# Patient Record
Sex: MALE | Race: WHITE | Employment: UNEMPLOYED | URBAN - NONMETROPOLITAN AREA
[De-identification: names, ages, dates, MRNs, and addresses within clinical notes are randomized per-mention and may not be internally consistent; named-entity substitution may affect disease eponyms.]

---

## 2017-02-23 ENCOUNTER — TELEPHONE (OUTPATIENT)
Dept: INTERNAL MEDICINE | Age: 57
End: 2017-02-23

## 2017-02-28 RX ORDER — LISINOPRIL 20 MG/1
20 TABLET ORAL DAILY
Qty: 30 TABLET | Refills: 11 | Status: SHIPPED | OUTPATIENT
Start: 2017-02-28 | End: 2018-03-19 | Stop reason: SDUPTHER

## 2017-05-10 ENCOUNTER — OFFICE VISIT (OUTPATIENT)
Dept: CARDIOLOGY | Age: 57
End: 2017-05-10
Payer: COMMERCIAL

## 2017-05-10 VITALS
WEIGHT: 227 LBS | BODY MASS INDEX: 31.78 KG/M2 | HEIGHT: 71 IN | DIASTOLIC BLOOD PRESSURE: 80 MMHG | SYSTOLIC BLOOD PRESSURE: 160 MMHG | HEART RATE: 85 BPM

## 2017-05-10 DIAGNOSIS — F41.9 ANXIETY: ICD-10-CM

## 2017-05-10 DIAGNOSIS — Z98.890 S/P CARDIAC CATH: ICD-10-CM

## 2017-05-10 DIAGNOSIS — I25.10 CORONARY ARTERY DISEASE INVOLVING NATIVE CORONARY ARTERY OF NATIVE HEART WITHOUT ANGINA PECTORIS: ICD-10-CM

## 2017-05-10 DIAGNOSIS — I10 ESSENTIAL HYPERTENSION: Primary | ICD-10-CM

## 2017-05-10 PROCEDURE — 93000 ELECTROCARDIOGRAM COMPLETE: CPT | Performed by: INTERNAL MEDICINE

## 2017-05-10 PROCEDURE — 99213 OFFICE O/P EST LOW 20 MIN: CPT | Performed by: INTERNAL MEDICINE

## 2017-11-06 RX ORDER — ATORVASTATIN CALCIUM 40 MG/1
40 TABLET, FILM COATED ORAL NIGHTLY
Qty: 90 TABLET | Refills: 3 | Status: SHIPPED | OUTPATIENT
Start: 2017-11-06 | End: 2019-06-06 | Stop reason: SDUPTHER

## 2017-11-08 ENCOUNTER — APPOINTMENT (OUTPATIENT)
Dept: GENERAL RADIOLOGY | Age: 57
End: 2017-11-08
Payer: COMMERCIAL

## 2017-11-08 ENCOUNTER — HOSPITAL ENCOUNTER (EMERGENCY)
Age: 57
Discharge: HOME | End: 2017-11-08
Attending: EMERGENCY MEDICINE
Payer: COMMERCIAL

## 2017-11-08 VITALS
SYSTOLIC BLOOD PRESSURE: 129 MMHG | HEART RATE: 81 BPM | RESPIRATION RATE: 18 BRPM | TEMPERATURE: 98.1 F | OXYGEN SATURATION: 92 % | DIASTOLIC BLOOD PRESSURE: 93 MMHG

## 2017-11-08 DIAGNOSIS — S01.81XA FACIAL LACERATION, INITIAL ENCOUNTER: Primary | ICD-10-CM

## 2017-11-08 DIAGNOSIS — S00.81XA FACIAL ABRASION, INITIAL ENCOUNTER: ICD-10-CM
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
## 2017-11-08 DIAGNOSIS — S16.1XXA ACUTE STRAIN OF NECK MUSCLE, INITIAL ENCOUNTER: ICD-10-CM

## 2017-11-08 PROCEDURE — 12011 RPR F/E/E/N/L/M 2.5 CM/<: CPT

## 2017-11-08 PROCEDURE — 72050 X-RAY EXAM NECK SPINE 4/5VWS: CPT

## 2017-11-08 PROCEDURE — 72040 X-RAY EXAM NECK SPINE 2-3 VW: CPT

## 2017-11-08 PROCEDURE — 99283 EMERGENCY DEPT VISIT LOW MDM: CPT

## 2017-11-08 PROCEDURE — 2500000003 HC RX 250 WO HCPCS: Performed by: EMERGENCY MEDICINE

## 2017-11-08 PROCEDURE — 6370000000 HC RX 637 (ALT 250 FOR IP): Performed by: EMERGENCY MEDICINE

## 2017-11-08 RX ORDER — LIDOCAINE HYDROCHLORIDE 10 MG/ML
5 INJECTION, SOLUTION INFILTRATION; PERINEURAL ONCE
Status: COMPLETED | OUTPATIENT
Start: 2017-11-08 | End: 2017-11-08

## 2017-11-08 RX ORDER — BACITRACIN, NEOMYCIN, POLYMYXIN B 400; 3.5; 5 [USP'U]/G; MG/G; [USP'U]/G
OINTMENT TOPICAL ONCE
Status: COMPLETED | OUTPATIENT
Start: 2017-11-08 | End: 2017-11-08

## 2017-11-08 RX ADMIN — LIDOCAINE HYDROCHLORIDE 5 ML: 10 INJECTION, SOLUTION INFILTRATION; PERINEURAL at 14:59

## 2017-11-08 RX ADMIN — BACITRACIN, NEOMYCIN, POLYMYXIN B 3.5 G: 400; 3.5; 5 OINTMENT TOPICAL at 15:36

## 2017-11-08 ASSESSMENT — ENCOUNTER SYMPTOMS
EYES NEGATIVE: 1
RESPIRATORY NEGATIVE: 1

## 2017-11-08 ASSESSMENT — PAIN SCALES - GENERAL: PAINLEVEL_OUTOF10: 4

## 2017-11-08 NOTE — ED NOTES
Discharge teaching and instructions for condition explained to patient. AVS reviewed. Patient voiced understanding regarding follow up appointments and care of self at home. Pt discharged to home in stable condition per self.        Oanh Mon RN  11/08/17 7012

## 2017-11-08 NOTE — ED PROVIDER NOTES
saturation is 92%. Physical Exam   Constitutional: He is oriented to person, place, and time. He appears well-developed and well-nourished. Awake alert pleasant gentleman sitting upgoing paperwork for B Aracelis C injury. Obvious upper lower lip injuries   HENT:   Head: Normocephalic and atraumatic. Right Ear: External ear normal.   Left Ear: External ear normal.   Upper lower lip abrasions and superficial lacerations as described under skin examination. No facial deformity. Nose is midline    No dental injury or tongue injury identified   Eyes: EOM are normal. Pupils are equal, round, and reactive to light. Right eye exhibits no discharge. Left eye exhibits no discharge. No scleral icterus. Neck: No tracheal deviation present. Slight diffuse posterior paraspinal muscle area discomfort. There is no bony tenderness or step-off along the midline. The cervical spine was gently flexed extended rotated and tilted for complete clearance after x-rays were obtained and screening   Cardiovascular: Normal rate and regular rhythm. Pulmonary/Chest: Effort normal. No stridor. No respiratory distress. Musculoskeletal: Normal range of motion. He exhibits no deformity. Neurological: He is oriented to person, place, and time. No cranial nerve deficit. Skin: Skin is warm and dry. He is not diaphoretic. Upper lip shows an abrasion contusion and some superficial gravel debris just off the left of the midline. There is a 1 cm vertical full-thickness laceration minimal bleeding that just barely touches the vermilion border    Lower lip has a bit of an avulsion abrasion is not really amenable to suturing no active bleeding some slight superficial swelling and abrasion left the midline lower lip. Out on the left facial area there is superficial abrasion   Psychiatric: He has a normal mood and affect. His behavior is normal.   Nursing note and vitals reviewed.       DIFFERENTIAL DIAGNOSIS:   Superficial laceration 2. Acute strain of neck muscle, initial encounter    3. Fall due to stumbling, initial encounter    4. Facial abrasion, initial encounter          DISPOSITION/PLAN   Discharged in stable improved condition. Return to clinic in one week for suture removal.  Advised to have sore neck headache and aches and pains for the next couple of days. Patient is on full strength aspirin by cardiologist and will need close monitoring for bleeding and return if that should occur    PATIENT REFERRED TO:  Rodolfo Noel  2013 [address] [place] [address]  In 1 week  return here sooner, As needed, If symptoms worsen. ...sutures out 7 days here dr Matthew Mac:  Discharge Medication List as of 11/8/2017  3:25 PM          (Please note that portions of this note were completed with a voice recognition program.  Efforts were made to edit the dictations but occasionally words are mis-transcribed. )    MD Rita Flores MD  11/08/17 2003

## 2017-11-13 ENCOUNTER — OFFICE VISIT (OUTPATIENT)
Dept: CARDIOLOGY | Age: 57
End: 2017-11-13
Payer: COMMERCIAL

## 2017-11-13 VITALS
WEIGHT: 226.6 LBS | HEIGHT: 71 IN | SYSTOLIC BLOOD PRESSURE: 146 MMHG | BODY MASS INDEX: 31.72 KG/M2 | HEART RATE: 82 BPM | DIASTOLIC BLOOD PRESSURE: 76 MMHG

## 2017-11-13 DIAGNOSIS — E78.5 HYPERLIPIDEMIA, UNSPECIFIED HYPERLIPIDEMIA TYPE: ICD-10-CM

## 2017-11-13 DIAGNOSIS — I25.10 CORONARY ARTERY DISEASE INVOLVING NATIVE CORONARY ARTERY OF NATIVE HEART WITHOUT ANGINA PECTORIS: ICD-10-CM

## 2017-11-13 DIAGNOSIS — I10 ESSENTIAL HYPERTENSION: Primary | ICD-10-CM

## 2017-11-13 PROCEDURE — 93000 ELECTROCARDIOGRAM COMPLETE: CPT | Performed by: INTERNAL MEDICINE

## 2017-11-13 PROCEDURE — 99213 OFFICE O/P EST LOW 20 MIN: CPT | Performed by: INTERNAL MEDICINE

## 2017-11-13 NOTE — PROGRESS NOTES
Priscilla Ambrosio  is doing well from a cardiac standpoint. 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 No chest pain, no dyspnea, no PND, no syncope or pre-syncope, no orthopnea. He continues to smoke. Past Medical History:   Diagnosis Date    Bunion     right    CAD (coronary artery disease)     Diverticul disease small and large intestine, no perforati or abscess     GERD (gastroesophageal reflux disease)     Hammer toe     right    Heart attack 11/09/2009    Hypertension     Pericarditis     Positive cardiac stress test     Smoking history        Past Surgical History:   Procedure Laterality Date    CARDIAC CATHETERIZATION      ENDOSCOPY, COLON, DIAGNOSTIC      FOOT SURGERY      KNEE ARTHROSCOPY  7-2006, 1-2014    UPPER GASTROINTESTINAL ENDOSCOPY         Family History   Problem Relation Age of Onset    Diabetes Mother     Early Death Brother 16     MVA    Early Death Sister 46     blood clot    Heart Disease Other        ROS: Otherwise 10 systems reviewed and negative. BP (!) 146/76   Pulse 82   Ht 5' 10.98\" (1.803 m)   Wt 226 lb 9.6 oz (102.8 kg)   BMI 31.62 kg/m²     Vitals as above. Alert and oriented x 3. No JVD, or carotid bruits. Lungs are clear to auscultation. Heart sounds are regular, normal, no murmurs, clicks, gallops or rubs S1, S2.   Abdomen is soft, no tenderness. Extremities No peripheral edema.     EKG:    Meds:    Current Outpatient Prescriptions:     atorvastatin (LIPITOR) 40 MG tablet, Take 1 tablet by mouth nightly, Disp: 90 tablet, Rfl: 3    ticagrelor (BRILINTA) 90 MG TABS tablet, Take 1 tablet by mouth 2 times daily, Disp: 180 tablet, Rfl: 3    metoprolol tartrate (LOPRESSOR) 25 MG tablet, Take 1 tablet by mouth 2 times daily, Disp: 180 tablet, Rfl: 3    lisinopril (PRINIVIL;ZESTRIL) 20 MG tablet, Take 1 tablet by mouth daily, Disp: 30 tablet, Rfl: 11    aspirin EC 81 MG EC tablet, Take 1 tablet by mouth daily, Disp:

## 2017-11-15 ENCOUNTER — HOSPITAL ENCOUNTER (OUTPATIENT)
Dept: LAB | Age: 57
Discharge: HOME | End: 2017-11-15
Payer: COMMERCIAL

## 2017-11-15 ENCOUNTER — HOSPITAL ENCOUNTER (OUTPATIENT)
Dept: GENERAL RADIOLOGY | Age: 57
Discharge: HOME | End: 2017-11-15
Payer: COMMERCIAL

## 2017-11-15 VITALS
HEART RATE: 80 BPM | DIASTOLIC BLOOD PRESSURE: 85 MMHG | OXYGEN SATURATION: 96 % | SYSTOLIC BLOOD PRESSURE: 144 MMHG | TEMPERATURE: 98.4 F | RESPIRATION RATE: 18 BRPM

## 2017-11-15 DIAGNOSIS — Z13.220 SCREENING FOR LIPOID DISORDERS: ICD-10-CM

## 2017-11-15 DIAGNOSIS — I10 ESSENTIAL HYPERTENSION: ICD-10-CM

## 2017-11-15 DIAGNOSIS — Z12.5 SPECIAL SCREENING FOR MALIGNANT NEOPLASM OF PROSTATE: ICD-10-CM

## 2017-11-15 DIAGNOSIS — I25.10 CORONARY ARTERY DISEASE INVOLVING NATIVE CORONARY ARTERY OF NATIVE HEART WITHOUT ANGINA PECTORIS: ICD-10-CM

## 2017-11-15 LAB
ALBUMIN SERPL-MCNC: 4.3 G/DL (ref 3.5–5.2)
ALBUMIN/GLOBULIN RATIO: 1.7 (ref 1–2.5)
ALP BLD-CCNC: 73 U/L (ref 40–129)
ALT SERPL-CCNC: 16 U/L (ref 5–41)
ANION GAP SERPL CALCULATED.3IONS-SCNC: 13 MMOL/L (ref 9–17)
AST SERPL-CCNC: 22 U/L
BILIRUB SERPL-MCNC: 0.48 MG/DL (ref 0.3–1.2)
BUN BLDV-MCNC: 15 MG/DL (ref 6–20)
BUN/CREAT BLD: 17 (ref 9–20)
CALCIUM SERPL-MCNC: 9.5 MG/DL (ref 8.6–10.4)
CHLORIDE BLD-SCNC: 97 MMOL/L (ref 98–107)
CHOLESTEROL/HDL RATIO: 2
CHOLESTEROL: 123 MG/DL
CO2: 25 MMOL/L (ref 20–31)
CREAT SERPL-MCNC: 0.86 MG/DL (ref 0.7–1.2)
GFR AFRICAN AMERICAN: >60 ML/MIN
GFR NON-AFRICAN AMERICAN: >60 ML/MIN
GFR SERPL CREATININE-BSD FRML MDRD: ABNORMAL ML/MIN/{1.73_M2}
GFR SERPL CREATININE-BSD FRML MDRD: ABNORMAL ML/MIN/{1.73_M2}
GLUCOSE BLD-MCNC: 102 MG/DL (ref 70–99)
HDLC SERPL-MCNC: 62 MG/DL
LDL CHOLESTEROL: 51 MG/DL (ref 0–130)
POTASSIUM SERPL-SCNC: 4.8 MMOL/L (ref 3.7–5.3)
PROSTATE SPECIFIC ANTIGEN: 3.09 UG/L
SODIUM BLD-SCNC: 135 MMOL/L (ref 135–144)
TOTAL PROTEIN: 6.9 G/DL (ref 6.4–8.3)
TRIGL SERPL-MCNC: 51 MG/DL
VLDLC SERPL CALC-MCNC: NORMAL MG/DL (ref 1–30)

## 2017-11-15 PROCEDURE — G0103 PSA SCREENING: HCPCS

## 2017-11-15 PROCEDURE — 80053 COMPREHEN METABOLIC PANEL: CPT

## 2017-11-15 PROCEDURE — 80061 LIPID PANEL: CPT

## 2017-11-15 PROCEDURE — 36415 COLL VENOUS BLD VENIPUNCTURE: CPT

## 2017-11-15 PROCEDURE — G0463 HOSPITAL OUTPT CLINIC VISIT: HCPCS

## 2017-11-15 NOTE — PROGRESS NOTES
Pt ambulates to exam room. VSS, except BP slightly elevated, Pt checks it every day and just saw cardiology this week. Dr prepared with suture remove  kit.

## 2017-11-15 NOTE — PLAN OF CARE
Problem: Intellectual/Education/Knowledge Deficit  Goal: Teaching initiated upon admission  Outcome: Completed Date Met: 11/15/17

## 2017-11-22 ENCOUNTER — OFFICE VISIT (OUTPATIENT)
Dept: INTERNAL MEDICINE | Age: 57
End: 2017-11-22
Payer: COMMERCIAL

## 2017-11-22 VITALS
HEIGHT: 70 IN | BODY MASS INDEX: 32.21 KG/M2 | DIASTOLIC BLOOD PRESSURE: 76 MMHG | RESPIRATION RATE: 16 BRPM | SYSTOLIC BLOOD PRESSURE: 134 MMHG | WEIGHT: 225 LBS | HEART RATE: 72 BPM

## 2017-11-22 DIAGNOSIS — M25.561 CHRONIC PAIN OF RIGHT KNEE: ICD-10-CM

## 2017-11-22 DIAGNOSIS — I10 ESSENTIAL HYPERTENSION: Primary | ICD-10-CM

## 2017-11-22 DIAGNOSIS — Z71.6 ENCOUNTER FOR SMOKING CESSATION COUNSELING: ICD-10-CM

## 2017-11-22 DIAGNOSIS — K21.9 GASTROESOPHAGEAL REFLUX DISEASE WITHOUT ESOPHAGITIS: ICD-10-CM

## 2017-11-22 DIAGNOSIS — I25.10 CORONARY ARTERY DISEASE INVOLVING NATIVE CORONARY ARTERY OF NATIVE HEART WITHOUT ANGINA PECTORIS: ICD-10-CM

## 2017-11-22 DIAGNOSIS — Z12.11 ENCOUNTER FOR SCREENING COLONOSCOPY: ICD-10-CM

## 2017-11-22 DIAGNOSIS — G89.29 CHRONIC PAIN OF RIGHT KNEE: ICD-10-CM

## 2017-11-22 DIAGNOSIS — Z12.5 SPECIAL SCREENING FOR MALIGNANT NEOPLASM OF PROSTATE: ICD-10-CM

## 2017-11-22 DIAGNOSIS — R97.20 RISING PSA LEVEL: ICD-10-CM

## 2017-11-22 PROCEDURE — G8484 FLU IMMUNIZE NO ADMIN: HCPCS | Performed by: INTERNAL MEDICINE

## 2017-11-22 PROCEDURE — G8427 DOCREV CUR MEDS BY ELIG CLIN: HCPCS | Performed by: INTERNAL MEDICINE

## 2017-11-22 PROCEDURE — G8598 ASA/ANTIPLAT THER USED: HCPCS | Performed by: INTERNAL MEDICINE

## 2017-11-22 PROCEDURE — 4004F PT TOBACCO SCREEN RCVD TLK: CPT | Performed by: INTERNAL MEDICINE

## 2017-11-22 PROCEDURE — G8417 CALC BMI ABV UP PARAM F/U: HCPCS | Performed by: INTERNAL MEDICINE

## 2017-11-22 PROCEDURE — 99214 OFFICE O/P EST MOD 30 MIN: CPT | Performed by: INTERNAL MEDICINE

## 2017-11-22 PROCEDURE — 3017F COLORECTAL CA SCREEN DOC REV: CPT | Performed by: INTERNAL MEDICINE

## 2017-11-22 RX ORDER — VARENICLINE TARTRATE 1 MG/1
1 TABLET, FILM COATED ORAL 2 TIMES DAILY
Qty: 60 TABLET | Refills: 4 | Status: SHIPPED | OUTPATIENT
Start: 2017-11-22 | End: 2018-11-30

## 2017-11-22 ASSESSMENT — PATIENT HEALTH QUESTIONNAIRE - PHQ9
SUM OF ALL RESPONSES TO PHQ QUESTIONS 1-9: 0
2. FEELING DOWN, DEPRESSED OR HOPELESS: 0
SUM OF ALL RESPONSES TO PHQ9 QUESTIONS 1 & 2: 0
1. LITTLE INTEREST OR PLEASURE IN DOING THINGS: 0

## 2017-11-22 ASSESSMENT — ENCOUNTER SYMPTOMS
NAUSEA: 0
VOMITING: 0
CONSTIPATION: 0
HEARTBURN: 1
COUGH: 0
DIARRHEA: 0
SHORTNESS OF BREATH: 0
BACK PAIN: 0
BLOOD IN STOOL: 0
ABDOMINAL PAIN: 0
EYE PAIN: 0

## 2017-11-22 NOTE — PATIENT INSTRUCTIONS
Patient Education        Learning About Physical Activity  What is physical activity? Physical activity is any kind of activity that gets your body moving. The types of physical activity that can help you get fit and stay healthy include:  · Aerobic or \"cardio\" activities that make your heart beat faster and make you breathe harder, such as brisk walking, riding a bike, or running. Aerobic activities strengthen your heart and lungs and build up your endurance. · Strength training activities that make your muscles work against, or \"resist,\" something, such as lifting weights or doing push-ups. These activities help tone and strengthen your muscles. · Stretches that allow you to move your joints and muscles through their full range of motion. Stretching helps you be more flexible and avoid injury. What are the benefits of physical activity? Being active is one of the best things you can do to get fit and stay healthy. It helps you to:  · Feel stronger and have more energy to do all the things you like to do. · Focus better at school or work and perform better in sports. · Feel, think, and sleep better. · Reach and stay at a healthy weight. · Lose fat and build lean muscle. · Lower your risk for serious health problems. · Keep your bones, muscles, and joints strong. Being fit lets you do more physical activity. And it lets you work out harder without as much effort. How can you make physical activity part of your life? Get at least 30 minutes of exercise on most days of the week. Walking is a good choice. You also may want to do other activities, such as running, swimming, cycling, or playing tennis or team sports. Pick activities that you like--ones that make your heart beat faster, your muscles stronger, and your muscles and joints more flexible. If you find more than one thing you like doing, do them all. You don't have to do the same thing every day. Get your heart pumping every day.  Any activity that makes your heart beat faster and keeps it at that rate for a while counts. Here are some great ways to get your heart beating faster:  · Go for a brisk walk, run, or bike ride. · Go for a hike or swim. · Go in-line skating. · Play a game of touch football, basketball, or soccer. · Ride a bike. · Play tennis or racquetball. · Climb stairs. Even some household chores can be aerobic--just do them at a faster pace. Vacuuming, raking or mowing the lawn, sweeping the garage, and washing and waxing the car all can help get your heart rate up. Strengthen your muscles during the week. You don't have to lift heavy weights or grow big, bulky muscles to get stronger. Doing a few simple activities that make your muscles work against, or \"resist,\" something can help you get stronger. For example, you can:  · Do push-ups or sit-ups, which use your own body weight as resistance. · Lift weights or dumbbells or use stretch bands at home or in a gym or community center. Stretch your muscles often. Stretching will help you as you become more active. It can help you stay flexible, loosen tight muscles, and avoid injury. It can also help improve your balance and posture and can be a great way to relax. Be sure to stretch the muscles you'll be using when you work out. It's best to warm your muscles slightly before you stretch them. Walk or do some other light aerobic activity for a few minutes, and then start stretching. When you stretch your muscles:  · Do it slowly. Stretching is not about going fast or making sudden movements. · Don't push or bounce during a stretch. · Hold each stretch for at least 15 to 30 seconds, if you can. You should feel a stretch in the muscle, but not pain. · Breathe out as you do the stretch. Then breathe in as you hold the stretch. Don't hold your breath. If you're worried about how more activity might affect your health, have a checkup before you start.  Follow any special advice your doctor gives you for getting a smart start. Where can you learn more? Go to https://chpepiceweb.[employer] Shadows. org and sign in to your [employer] account. Enter S160 in the Snapette box to learn more about \"Learning About Physical Activity. \"     If you do not have an account, please click on the \"Sign Up Now\" link. Current as of: March 13, 2017  Content Version: 11.3  © 2930-3310 [employer]. Care instructions adapted under license by [facility] ([facility]). If you have questions about a medical condition or this instruction, always ask your healthcare professional. [address] any warranty or liability for your use of this information.

## 2017-11-22 NOTE — PROGRESS NOTES
[facility]  [address] [id number]  Dept: [phone]  Dept Fax: [phone]  Loc: [phone]    Deandre Avilez is a 56 y.o. male who presents today for his medical conditions/complaints as noted below. Deandre Avilez is c/o of   Chief Complaint   Patient presents with    Hypertension     annual, labs    Knee Pain     chronic right knee pain- annual appt    Gastroesophageal Reflux     annual         HPI:     Hypertension   This is a chronic (Essential hypertension) problem. The current episode started more than 1 year ago. The problem has been waxing and waning since onset. The problem is controlled. Pertinent negatives include no chest pain, headaches, neck pain, palpitations or shortness of breath. Knee Pain    The incident occurred more than 1 week ago. The incident occurred at home. There was no injury mechanism. The pain is present in the right knee. The pain is mild. The pain has been fluctuating since onset. Pertinent negatives include no numbness. Gastroesophageal Reflux   He complains of heartburn. He reports no abdominal pain, no chest pain, no coughing or no nausea. This is a chronic problem. The current episode started more than 1 year ago. The problem occurs rarely. The problem has been waxing and waning.        No results found for: LABA1C             No results found for: LABMICR  LDL Cholesterol (mg/dL)   Date Value   11/15/2017 51   11/09/2016 42   11/16/2015 97         AST (U/L)   Date Value   11/15/2017 22     ALT (U/L)   Date Value   11/15/2017 16     BUN (mg/dL)   Date Value   11/15/2017 15     BP Readings from Last 3 Encounters:   11/22/17 134/76   11/15/17 (!) 144/85   11/13/17 (!) 146/76              Past Medical History:   Diagnosis Date    Bunion     right    CAD (coronary artery disease)     Diverticul disease small and large intestine, no perforati or abscess     GERD (gastroesophageal reflux disease)     Hammer toe Lymphadenopathy:     He has no cervical adenopathy. Neurological: He is alert and oriented to person, place, and time. No cranial nerve deficit (grossly). Skin: Skin is warm and dry. No rash noted. Psychiatric: He has a normal mood and affect. Thought content normal.   Vitals reviewed. /76 (Site: Left Arm, Position: Sitting, Cuff Size: Large Adult)   Pulse 72   Resp 16   Ht 5' 10\" (1.778 m)   Wt 225 lb (102.1 kg)   BMI 32.28 kg/m²     Assessment:      1. Essential hypertension  Comprehensive Metabolic Panel    Lipid Panel   2. Chronic pain of right knee     3. Gastroesophageal reflux disease without esophagitis  Comprehensive Metabolic Panel    Lipid Panel   4. Coronary artery disease involving native coronary artery of native heart without angina pectoris  Comprehensive Metabolic Panel    Lipid Panel   5. Special screening for malignant neoplasm of prostate  PSA Screening   6. Rising PSA level  PSA, Diagnostic   7. Encounter for screening colonoscopy  [address], [place], General Surgery [place]             Plan:      Return in about 1 year (around 11/22/2018) for Hypertension, GERD, knee pain.     Orders Placed This Encounter   Procedures    Comprehensive Metabolic Panel     Standing Status:   Future     Standing Expiration Date:   11/22/2018    Lipid Panel     Standing Status:   Future     Standing Expiration Date:   11/22/2018     Order Specific Question:   Is Patient Fasting?/# of Hours     Answer:   15    PSA Screening     Standing Status:   Future     Standing Expiration Date:   11/22/2018    PSA, Diagnostic     Standing Status:   Future     Standing Expiration Date:   11/22/2018   [address], [place], [facility] [place]     Referral Priority:   Routine     Referral Type:   Consult for Advice and Opinion     Referral Reason:   Specialty Services Required     Referred to Provider:   Oliver Miller MD     Requested Specialty:   General Surgery     Number of

## 2017-11-29 ENCOUNTER — TELEPHONE (OUTPATIENT)
Dept: SURGERY | Age: 57
End: 2017-11-29

## 2017-11-29 ENCOUNTER — INITIAL CONSULT (OUTPATIENT)
Dept: SURGERY | Age: 57
End: 2017-11-29
Payer: COMMERCIAL

## 2017-11-29 VITALS
BODY MASS INDEX: 32.84 KG/M2 | DIASTOLIC BLOOD PRESSURE: 76 MMHG | SYSTOLIC BLOOD PRESSURE: 140 MMHG | TEMPERATURE: 98.3 F | RESPIRATION RATE: 16 BRPM | WEIGHT: 229.4 LBS | HEART RATE: 80 BPM | HEIGHT: 70 IN

## 2017-11-29 DIAGNOSIS — Z12.11 ENCOUNTER FOR SCREENING COLONOSCOPY: Primary | ICD-10-CM

## 2017-11-29 PROCEDURE — 99204 OFFICE O/P NEW MOD 45 MIN: CPT | Performed by: SURGERY

## 2017-11-29 PROCEDURE — G8417 CALC BMI ABV UP PARAM F/U: HCPCS | Performed by: SURGERY

## 2017-11-29 PROCEDURE — G8427 DOCREV CUR MEDS BY ELIG CLIN: HCPCS | Performed by: SURGERY

## 2017-11-29 PROCEDURE — 3017F COLORECTAL CA SCREEN DOC REV: CPT | Performed by: SURGERY

## 2017-11-29 PROCEDURE — G8484 FLU IMMUNIZE NO ADMIN: HCPCS | Performed by: SURGERY

## 2017-11-29 PROCEDURE — 4004F PT TOBACCO SCREEN RCVD TLK: CPT | Performed by: SURGERY

## 2017-11-29 PROCEDURE — G8598 ASA/ANTIPLAT THER USED: HCPCS | Performed by: SURGERY

## 2017-11-29 NOTE — TELEPHONE ENCOUNTER
[facility]         Patient:Ritchie Garcia           :1960           Surgical/Procedure Planned: colonoscopy     Date & Location: 18       Outpatient   Planned Length of OR: 30 min    Sedation: intravenous sedation        Estimated Cardiac Risk for Non-Cardiac Surgery/Procedure     Low______ Moderate______ High______    Medication Instructions - Clarification needed by this date:     -Other medications       :brilinta    ASA 81 mg Hold ___ Days      Resume medications:      Provider:      Signature of Provider Giving Orders for Medication holds:    _____________________________________________

## 2017-11-29 NOTE — LETTER
COLONOSCOPY             Day Before Examination:        Morning:   Mix bowel prep according to directions and refrigerate. Only CLEAR  LIQUIDS are permitted until midnight. NO FOOD THIS DAY!! Clear liquids including any of the following:   Water   Clear broth or bouillon   7-up, Sprite or Ginger ale   Gatorade or Martín-Aid   Popsicles   Coffee or tea (without milk or sweetener)   Plain Jell-o   NOTHING RED OR PURPLE     At 12 noon take two (2) Dulcolax tablets     Evening: Begin drinking prep at 4:00pm. Drink an 8 ounce glass every 10 minutes. It is  best to drink the whole glass rapidly rather than sipping small amounts. Continue  drinking until the bottle is empty. Bowel movements should begin approximately one(1) hour after the first glass of prep. They will continue periodically one (1) to two (2) hours after drinking the first glass. If you experience bloating, cramping or nausea set the prep aside for 30-40 minutes, the start again. This is temporary and will disappear once bowel movements begin. AMBULATORY SURGERY INSTRUCTIONS    - If you should develop a cold, sore throat, cough, fever or other new indication of illness prior to the scheduled surgery, please notify the [address] [place] [address] office as early as possible. - DO NOT EAT OR DRINK ANYTHING AFTER MIDNIGHT THE NIGHT BEFORE YOUR SURGERY. This includes water, tea, juice, cereal, coffee, etc.    - Refrain from smoking the day of your surgery or procedure. - Wear simple loose clothing, which can be easily changed. - Do not wear any make-up, lipstick or nail polish. - Please leave contact lenses at home or bring container for them. - Leave jewelry (including rings) and other valuables at home. - Make arrangements for a family member or friend to drive you to and from the surgery center.  The anesthetic may affect your judgement following surgery and driving a vehicle within 24 hours after the anesthesia could be dangerous. Please remember that a responsible adult must remain in the building at all times during your surgery. - Children should be accompanied by two adults; one to watch the child, the other to drive the vehicle home. - Please shower or bathe both on the evening prior to surgery and on the morning of surgery using an antibacterial soap/Hibiclens    - You may be asked to sign several forms prior to surgery; patients under age 25 have a parent or legal guardian sign the permit to operate.    - DO NOT take aspirin, Aleve, Motrin, Ibuprofen, Naproxen, Vitamins or Herbal supplements for 1 weeks or 7 days prior to the day of surgery.    -The morning of your procedure please take blood pressure, heart, and seizure medications with a small sip of water. Day of procedure report to the [facility], Registration office on the 1st floor in the hospital, after check in you will then go to the second floor. [facility] requests that all medications are kept in their original bottles and brought to the procedure. PROCEDURE DATE:                      Estimated surgery arrival time     You will be notified the day before surgery (usually in the afternoon) of your arrival time by the surgery center.

## 2017-11-29 NOTE — PROGRESS NOTES
SOCIAL HISTORY      Smoking    Tobacco yes   Marijuana No   Chew No   Snuff No     Drinking     Alcohol Yes     Non-Alcoholic No       Coffee Yes     Pop No     Tea Yes    Any over the counter medications      NSAID'S Yes      Health food supplements  Yes what was listed in chart        Daily Diet (Do you eat at least one of  these daily)        Beef Yes     Pork Yes     Fish Yes     Poultry Yes     Dairy Yes        Do you consume any of the following at least once a day     Fruits no     Vegetables No     Fiber No       Restricted calorie diet No   Diabetic diet No    Chocolate Yes  Laxatives No    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

## 2017-11-29 NOTE — PATIENT INSTRUCTIONS
is worse than the test. It may be uncomfortable, and you may feel hungry on the clear liquid diet. Some people do not go to work or do their usual activities on the day of the prep. Arrange to have someone take you home after the test.  What can you expect after a colonoscopy? The nurses will watch you for 1 to 2 hours until the medicines wear off. Then you can go home. You will need a ride. Your doctor will tell you when you can eat and do your usual activities. Your doctor will talk to you about when you will need your next colonoscopy. The results of your test and your risk for colorectal cancer will help your doctor decide how often you need to be checked. Follow-up care is a key part of your treatment and safety. Be sure to make and go to all appointments, and call your doctor if you are having problems. It's also a good idea to know your test results and keep a list of the medicines you take. Where can you learn more? Go to https://angelMDpeju.Welltheon. org and sign in to your [employer] account. Enter O694 in the gaytravel.com box to learn more about \"Learning About Colonoscopy. \"     If you do not have an account, please click on the \"Sign Up Now\" link. Current as of: July 26, 2016  Content Version: 11.3  © 9141-3386 [employer]. Care instructions adapted under license by [facility] ([facility]). If you have questions about a medical condition or this instruction, always ask your healthcare professional. [place] [address] any warranty or liability for your use of this information.

## 2017-11-29 NOTE — PROGRESS NOTES
CATHETERIZATION      FOOT SURGERY      KNEE ARTHROSCOPY  7-2006, 1-2014    UPPER GASTROINTESTINAL ENDOSCOPY         Current Outpatient Prescriptions on File Prior to Visit   Medication Sig Dispense Refill    ticagrelor (BRILINTA) 60 MG TABS tablet Take 1 tablet by mouth 2 times daily 180 tablet 3    atorvastatin (LIPITOR) 40 MG tablet Take 1 tablet by mouth nightly 90 tablet 3    metoprolol tartrate (LOPRESSOR) 25 MG tablet Take 1 tablet by mouth 2 times daily 180 tablet 3    lisinopril (PRINIVIL;ZESTRIL) 20 MG tablet Take 1 tablet by mouth daily 30 tablet 11    aspirin EC 81 MG EC tablet Take 1 tablet by mouth daily 90 tablet 3    esomeprazole (NEXIUM) 20 MG delayed release capsule Take 20 mg by mouth daily      naproxen sodium (ALEVE) 220 MG tablet Take 440 mg by mouth 2 times daily (with meals)      calcium carbonate 600 MG TABS tablet Take 1 tablet by mouth daily      varenicline (CHANTIX CONTINUING MONTH FILIBERTO) 1 MG tablet Take 1 tablet by mouth 2 times daily 60 tablet 4    albuterol sulfate HFA (PROVENTIL HFA) 108 (90 BASE) MCG/ACT inhaler Inhale 2 puffs into the lungs every 6 hours as needed for Wheezing 1 Inhaler 3     No current facility-administered medications on file prior to visit. Allergies   Allergen Reactions    Ciprofloxacin Itching and Rash        reports that he has been smoking Cigarettes. He has a 15.00 pack-year smoking history. He has never used smokeless tobacco.  reports that he drinks about 7.2 oz of alcohol per week .       Review of Systems - History obtained from chart review and the patient  General ROS: negative  Psychological ROS: negative  Ophthalmic ROS: negative  Hematological and Lymphatic ROS: negative  Endocrine ROS: negative  Respiratory ROS: negative  Cardiovascular ROS: negative  History of cardiac cath and insertion of stents on aspirin and anticoagulant  Gastrointestinal ROS: no abdominal pain, change in bowel habits, or black or bloody stools  History of esophagitis and esophageal obstruction with food and 2007  Genito-Urinary ROS: negative  Musculoskeletal ROS: Arthritis of the knees and hands  Neurological ROS: negative        Physical Exam:    BP (!) 140/76   Pulse 80   Temp 98.3 °F (36.8 °C)   Resp 16   Ht 5' 10\" (1.778 m)   Wt 229 lb 6.4 oz (104.1 kg)   BMI 32.92 kg/m²   Weight: 229 lb 6.4 oz (104.1 kg)     General Appearance:  awake, alert, oriented, in no acute distress, well developed, well nourished, in no acute distress, cooperative and ambulatory  Skin:  positives: hypopigmentation - face, neck, arms, scar - face, neck, arms  Head/face:  NCAT  Neck:  neck- supple, no mass, non-tender and no bruits  Lungs:  Normal expansion. Clear to auscultation. No rales, rhonchi, or wheezing. Heart:  Heart sounds are normal.  Regular rate and rhythm without murmur, gallop or rub. Heart regular rate and rhythm  Extremities: Extremities warm to touch, pink, with no edema. Musculoskeletal:  Range of motion normal in hips, knees, shoulders, and spine. Neurologic:  negative        Assessment:  Encounter for screening colonoscopy. Tobacco abuse. The patient is advised to stop smoking and drinking. He is also advised to stop the aspirin for 1 week prior the colonoscopy and take milk of magnesia 2 ounces daily for 3 days prior to the regular prep. Plan:  Scheduled for colonoscopy.     Electronically signed by Bethany Kaplan MD on 11/29/2017 at 4:52 PM

## 2018-01-16 ENCOUNTER — OFFICE VISIT (OUTPATIENT)
Dept: SURGERY | Age: 58
End: 2018-01-16
Payer: COMMERCIAL

## 2018-01-16 VITALS
BODY MASS INDEX: 33.93 KG/M2 | SYSTOLIC BLOOD PRESSURE: 138 MMHG | DIASTOLIC BLOOD PRESSURE: 80 MMHG | TEMPERATURE: 99.1 F | HEART RATE: 82 BPM | WEIGHT: 237 LBS | RESPIRATION RATE: 16 BRPM | HEIGHT: 70 IN

## 2018-01-16 DIAGNOSIS — K57.30 SIGMOID DIVERTICULOSIS: ICD-10-CM

## 2018-01-16 DIAGNOSIS — K63.5 HYPERPLASTIC POLYP OF SIGMOID COLON: Primary | ICD-10-CM

## 2018-01-16 PROCEDURE — 99213 OFFICE O/P EST LOW 20 MIN: CPT | Performed by: SURGERY

## 2018-01-16 PROCEDURE — 4004F PT TOBACCO SCREEN RCVD TLK: CPT | Performed by: SURGERY

## 2018-01-16 PROCEDURE — G8417 CALC BMI ABV UP PARAM F/U: HCPCS | Performed by: SURGERY

## 2018-01-16 PROCEDURE — 3017F COLORECTAL CA SCREEN DOC REV: CPT | Performed by: SURGERY

## 2018-01-16 PROCEDURE — G8484 FLU IMMUNIZE NO ADMIN: HCPCS | Performed by: SURGERY

## 2018-01-16 PROCEDURE — G8599 NO ASA/ANTIPLAT THER USE RNG: HCPCS | Performed by: SURGERY

## 2018-01-16 PROCEDURE — G8427 DOCREV CUR MEDS BY ELIG CLIN: HCPCS | Performed by: SURGERY

## 2018-01-16 NOTE — PROGRESS NOTES
This patient is seen post screening colonoscopy and biopsy of sigmoid polyp. The colonoscopy showed extensive sigmoid diverticulosis and the polyp was found to be hyperplastic polyp. He is advised to have a repeat colonoscopy after 5 years. His diet was reviewed as well as his medications. He is also advised to reduce his use of NSAIDs and do the with caution and may be alternate using Tylenol for his aches and pains. He is advised to follow high-fiber diet and take fiber supplement 1 tablespoonful 3 times daily with a glass of water 12 ounces.

## 2018-03-20 RX ORDER — LISINOPRIL 20 MG/1
TABLET ORAL
Qty: 90 TABLET | Refills: 3 | Status: SHIPPED | OUTPATIENT
Start: 2018-03-20 | End: 2019-06-04 | Stop reason: SDUPTHER

## 2018-05-16 ENCOUNTER — OFFICE VISIT (OUTPATIENT)
Dept: CARDIOLOGY | Age: 58
End: 2018-05-16
Payer: COMMERCIAL

## 2018-05-16 VITALS
HEART RATE: 74 BPM | SYSTOLIC BLOOD PRESSURE: 150 MMHG | WEIGHT: 226 LBS | DIASTOLIC BLOOD PRESSURE: 80 MMHG | BODY MASS INDEX: 32.35 KG/M2 | HEIGHT: 70 IN

## 2018-05-16 DIAGNOSIS — E78.5 HYPERLIPIDEMIA, UNSPECIFIED HYPERLIPIDEMIA TYPE: ICD-10-CM

## 2018-05-16 DIAGNOSIS — I10 ESSENTIAL HYPERTENSION: Primary | ICD-10-CM

## 2018-05-16 DIAGNOSIS — Z95.5 STENTED CORONARY ARTERY: ICD-10-CM

## 2018-05-16 DIAGNOSIS — F17.200 SMOKING: ICD-10-CM

## 2018-05-16 PROCEDURE — 93000 ELECTROCARDIOGRAM COMPLETE: CPT | Performed by: INTERNAL MEDICINE

## 2018-05-16 PROCEDURE — 99214 OFFICE O/P EST MOD 30 MIN: CPT | Performed by: INTERNAL MEDICINE

## 2018-05-24 ENCOUNTER — OFFICE VISIT (OUTPATIENT)
Dept: INTERNAL MEDICINE | Age: 58
End: 2018-05-24
Payer: COMMERCIAL

## 2018-05-24 ENCOUNTER — HOSPITAL ENCOUNTER (OUTPATIENT)
Dept: GENERAL RADIOLOGY | Age: 58
Discharge: HOME | End: 2018-05-26
Payer: COMMERCIAL

## 2018-05-24 VITALS
HEART RATE: 88 BPM | DIASTOLIC BLOOD PRESSURE: 86 MMHG | SYSTOLIC BLOOD PRESSURE: 138 MMHG | BODY MASS INDEX: 32.86 KG/M2 | WEIGHT: 229 LBS

## 2018-05-24 DIAGNOSIS — M79.601 RIGHT ARM PAIN: ICD-10-CM

## 2018-05-24 DIAGNOSIS — M25.532 LEFT WRIST PAIN: ICD-10-CM

## 2018-05-24 DIAGNOSIS — M25.532 LEFT WRIST PAIN: Primary | ICD-10-CM

## 2018-05-24 PROCEDURE — 99213 OFFICE O/P EST LOW 20 MIN: CPT | Performed by: NURSE PRACTITIONER

## 2018-05-24 PROCEDURE — 3017F COLORECTAL CA SCREEN DOC REV: CPT | Performed by: NURSE PRACTITIONER

## 2018-05-24 PROCEDURE — G8599 NO ASA/ANTIPLAT THER USE RNG: HCPCS | Performed by: NURSE PRACTITIONER

## 2018-05-24 PROCEDURE — 4004F PT TOBACCO SCREEN RCVD TLK: CPT | Performed by: NURSE PRACTITIONER

## 2018-05-24 PROCEDURE — G8417 CALC BMI ABV UP PARAM F/U: HCPCS | Performed by: NURSE PRACTITIONER

## 2018-05-24 PROCEDURE — 73110 X-RAY EXAM OF WRIST: CPT

## 2018-05-24 PROCEDURE — G8427 DOCREV CUR MEDS BY ELIG CLIN: HCPCS | Performed by: NURSE PRACTITIONER

## 2018-05-24 RX ORDER — IBUPROFEN 200 MG
400 TABLET ORAL 2 TIMES DAILY PRN
COMMUNITY
End: 2019-01-31

## 2018-05-24 RX ORDER — PREDNISONE 20 MG/1
20 TABLET ORAL 2 TIMES DAILY
Qty: 10 TABLET | Refills: 0 | Status: SHIPPED | OUTPATIENT
Start: 2018-05-24 | End: 2018-05-29

## 2018-05-25 DIAGNOSIS — T14.8XXA AVULSION FRACTURE: ICD-10-CM

## 2018-05-25 DIAGNOSIS — M25.532 LEFT WRIST PAIN: Primary | ICD-10-CM

## 2018-05-25 DIAGNOSIS — R93.89 ABNORMAL X-RAY EXAMINATION: ICD-10-CM

## 2018-05-30 ASSESSMENT — ENCOUNTER SYMPTOMS: BACK PAIN: 1

## 2018-11-19 DIAGNOSIS — G89.29 CHRONIC PAIN OF LEFT KNEE: Primary | ICD-10-CM

## 2018-11-19 DIAGNOSIS — M25.562 CHRONIC PAIN OF LEFT KNEE: Primary | ICD-10-CM

## 2018-11-21 ENCOUNTER — OFFICE VISIT (OUTPATIENT)
Dept: CARDIOLOGY | Age: 58
End: 2018-11-21
Payer: COMMERCIAL

## 2018-11-21 ENCOUNTER — HOSPITAL ENCOUNTER (OUTPATIENT)
Dept: LAB | Age: 58
Discharge: HOME | End: 2018-11-21
Payer: COMMERCIAL

## 2018-11-21 VITALS
HEART RATE: 78 BPM | BODY MASS INDEX: 32.78 KG/M2 | HEIGHT: 70 IN | WEIGHT: 229 LBS | DIASTOLIC BLOOD PRESSURE: 80 MMHG | SYSTOLIC BLOOD PRESSURE: 126 MMHG

## 2018-11-21 DIAGNOSIS — R97.20 RISING PSA LEVEL: ICD-10-CM

## 2018-11-21 DIAGNOSIS — K21.9 GASTROESOPHAGEAL REFLUX DISEASE WITHOUT ESOPHAGITIS: ICD-10-CM

## 2018-11-21 DIAGNOSIS — I25.10 CORONARY ARTERY DISEASE INVOLVING NATIVE CORONARY ARTERY OF NATIVE HEART WITHOUT ANGINA PECTORIS: ICD-10-CM

## 2018-11-21 DIAGNOSIS — I10 ESSENTIAL HYPERTENSION: ICD-10-CM

## 2018-11-21 DIAGNOSIS — Z12.5 SPECIAL SCREENING FOR MALIGNANT NEOPLASM OF PROSTATE: ICD-10-CM

## 2018-11-21 DIAGNOSIS — F17.200 SMOKING: ICD-10-CM

## 2018-11-21 DIAGNOSIS — E78.5 HYPERLIPIDEMIA, UNSPECIFIED HYPERLIPIDEMIA TYPE: ICD-10-CM

## 2018-11-21 DIAGNOSIS — I10 ESSENTIAL HYPERTENSION: Primary | ICD-10-CM

## 2018-11-21 DIAGNOSIS — Z95.5 STENTED CORONARY ARTERY: ICD-10-CM

## 2018-11-21 LAB
ALBUMIN SERPL-MCNC: 4.2 G/DL (ref 3.5–5.2)
ALBUMIN/GLOBULIN RATIO: 1.3 (ref 1–2.5)
ALP BLD-CCNC: 78 U/L (ref 40–129)
ALT SERPL-CCNC: 19 U/L (ref 5–41)
ANION GAP SERPL CALCULATED.3IONS-SCNC: 12 MMOL/L (ref 9–17)
AST SERPL-CCNC: 22 U/L
BILIRUB SERPL-MCNC: 0.56 MG/DL (ref 0.3–1.2)
BUN BLDV-MCNC: 17 MG/DL (ref 6–20)
BUN/CREAT BLD: 22 (ref 9–20)
CALCIUM SERPL-MCNC: 9.4 MG/DL (ref 8.6–10.4)
CHLORIDE BLD-SCNC: 101 MMOL/L (ref 98–107)
CHOLESTEROL/HDL RATIO: 2.1
CHOLESTEROL: 133 MG/DL
CO2: 26 MMOL/L (ref 20–31)
CREAT SERPL-MCNC: 0.76 MG/DL (ref 0.7–1.2)
GFR AFRICAN AMERICAN: >60 ML/MIN
GFR NON-AFRICAN AMERICAN: >60 ML/MIN
GFR SERPL CREATININE-BSD FRML MDRD: ABNORMAL ML/MIN/{1.73_M2}
GFR SERPL CREATININE-BSD FRML MDRD: ABNORMAL ML/MIN/{1.73_M2}
GLUCOSE BLD-MCNC: 123 MG/DL (ref 70–99)
HDLC SERPL-MCNC: 62 MG/DL
LDL CHOLESTEROL: 57 MG/DL (ref 0–130)
POTASSIUM SERPL-SCNC: 4.8 MMOL/L (ref 3.7–5.3)
PROSTATE SPECIFIC ANTIGEN: 2.09 UG/L
SODIUM BLD-SCNC: 139 MMOL/L (ref 135–144)
TOTAL PROTEIN: 7.4 G/DL (ref 6.4–8.3)
TRIGL SERPL-MCNC: 71 MG/DL
VLDLC SERPL CALC-MCNC: NORMAL MG/DL (ref 1–30)

## 2018-11-21 PROCEDURE — 99214 OFFICE O/P EST MOD 30 MIN: CPT | Performed by: INTERNAL MEDICINE

## 2018-11-21 PROCEDURE — 80061 LIPID PANEL: CPT

## 2018-11-21 PROCEDURE — 36415 COLL VENOUS BLD VENIPUNCTURE: CPT

## 2018-11-21 PROCEDURE — 80053 COMPREHEN METABOLIC PANEL: CPT

## 2018-11-21 PROCEDURE — 93000 ELECTROCARDIOGRAM COMPLETE: CPT | Performed by: INTERNAL MEDICINE

## 2018-11-21 PROCEDURE — G0103 PSA SCREENING: HCPCS

## 2018-11-27 ENCOUNTER — HOSPITAL ENCOUNTER (OUTPATIENT)
Dept: GENERAL RADIOLOGY | Age: 58
Discharge: HOME | End: 2018-11-29
Payer: COMMERCIAL

## 2018-11-27 ENCOUNTER — OFFICE VISIT (OUTPATIENT)
Dept: ORTHOPEDIC SURGERY | Age: 58
End: 2018-11-27
Payer: COMMERCIAL

## 2018-11-27 VITALS
DIASTOLIC BLOOD PRESSURE: 80 MMHG | HEIGHT: 71 IN | HEART RATE: 85 BPM | BODY MASS INDEX: 31.78 KG/M2 | WEIGHT: 227 LBS | SYSTOLIC BLOOD PRESSURE: 152 MMHG

## 2018-11-27 DIAGNOSIS — M25.562 CHRONIC PAIN OF LEFT KNEE: ICD-10-CM

## 2018-11-27 DIAGNOSIS — M17.12 PRIMARY OSTEOARTHRITIS OF LEFT KNEE: Primary | ICD-10-CM

## 2018-11-27 DIAGNOSIS — G89.29 CHRONIC PAIN OF LEFT KNEE: ICD-10-CM

## 2018-11-27 PROCEDURE — 4004F PT TOBACCO SCREEN RCVD TLK: CPT | Performed by: PHYSICIAN ASSISTANT

## 2018-11-27 PROCEDURE — 20610 DRAIN/INJ JOINT/BURSA W/O US: CPT | Performed by: PHYSICIAN ASSISTANT

## 2018-11-27 PROCEDURE — G8427 DOCREV CUR MEDS BY ELIG CLIN: HCPCS | Performed by: PHYSICIAN ASSISTANT

## 2018-11-27 PROCEDURE — 99212 OFFICE O/P EST SF 10 MIN: CPT | Performed by: PHYSICIAN ASSISTANT

## 2018-11-27 PROCEDURE — G8484 FLU IMMUNIZE NO ADMIN: HCPCS | Performed by: PHYSICIAN ASSISTANT

## 2018-11-27 PROCEDURE — 3017F COLORECTAL CA SCREEN DOC REV: CPT | Performed by: PHYSICIAN ASSISTANT

## 2018-11-27 PROCEDURE — 73562 X-RAY EXAM OF KNEE 3: CPT

## 2018-11-27 PROCEDURE — G8417 CALC BMI ABV UP PARAM F/U: HCPCS | Performed by: PHYSICIAN ASSISTANT

## 2018-11-27 PROCEDURE — G8599 NO ASA/ANTIPLAT THER USE RNG: HCPCS | Performed by: PHYSICIAN ASSISTANT

## 2018-11-27 RX ORDER — TRIAMCINOLONE ACETONIDE 40 MG/ML
80 INJECTION, SUSPENSION INTRA-ARTICULAR; INTRAMUSCULAR ONCE
Status: COMPLETED | OUTPATIENT
Start: 2018-11-27 | End: 2018-11-27

## 2018-11-27 RX ORDER — NAPROXEN 500 MG/1
500 TABLET ORAL 2 TIMES DAILY WITH MEALS
Qty: 60 TABLET | Refills: 3 | Status: SHIPPED | OUTPATIENT
Start: 2018-11-27 | End: 2019-03-04

## 2018-11-27 RX ORDER — BUPIVACAINE HYDROCHLORIDE 5 MG/ML
30 INJECTION, SOLUTION PERINEURAL ONCE
Status: COMPLETED | OUTPATIENT
Start: 2018-11-27 | End: 2018-11-27

## 2018-11-27 RX ADMIN — BUPIVACAINE HYDROCHLORIDE 150 MG: 5 INJECTION, SOLUTION PERINEURAL at 11:39

## 2018-11-27 RX ADMIN — TRIAMCINOLONE ACETONIDE 80 MG: 40 INJECTION, SUSPENSION INTRA-ARTICULAR; INTRAMUSCULAR at 11:38

## 2018-11-30 ENCOUNTER — OFFICE VISIT (OUTPATIENT)
Dept: INTERNAL MEDICINE | Age: 58
End: 2018-11-30
Payer: COMMERCIAL

## 2018-11-30 VITALS
BODY MASS INDEX: 33.21 KG/M2 | HEIGHT: 70 IN | SYSTOLIC BLOOD PRESSURE: 148 MMHG | HEART RATE: 74 BPM | DIASTOLIC BLOOD PRESSURE: 82 MMHG | RESPIRATION RATE: 16 BRPM | WEIGHT: 232 LBS

## 2018-11-30 DIAGNOSIS — I25.10 CORONARY ARTERY DISEASE INVOLVING NATIVE CORONARY ARTERY OF NATIVE HEART WITHOUT ANGINA PECTORIS: ICD-10-CM

## 2018-11-30 DIAGNOSIS — K21.9 GASTROESOPHAGEAL REFLUX DISEASE WITHOUT ESOPHAGITIS: ICD-10-CM

## 2018-11-30 DIAGNOSIS — Z12.5 ENCOUNTER FOR SCREENING FOR MALIGNANT NEOPLASM OF PROSTATE: ICD-10-CM

## 2018-11-30 DIAGNOSIS — Z00.00 GENERAL MEDICAL EXAMINATION: Primary | ICD-10-CM

## 2018-11-30 DIAGNOSIS — I10 ESSENTIAL HYPERTENSION: ICD-10-CM

## 2018-11-30 DIAGNOSIS — R05.9 COUGH: ICD-10-CM

## 2018-11-30 DIAGNOSIS — R06.2 WHEEZING: ICD-10-CM

## 2018-11-30 PROCEDURE — 99396 PREV VISIT EST AGE 40-64: CPT | Performed by: INTERNAL MEDICINE

## 2018-11-30 PROCEDURE — G8484 FLU IMMUNIZE NO ADMIN: HCPCS | Performed by: INTERNAL MEDICINE

## 2018-11-30 RX ORDER — ALBUTEROL SULFATE 90 UG/1
2 AEROSOL, METERED RESPIRATORY (INHALATION) EVERY 6 HOURS PRN
Qty: 1 INHALER | Refills: 3 | Status: SHIPPED | OUTPATIENT
Start: 2018-11-30 | End: 2019-04-23

## 2018-11-30 ASSESSMENT — PATIENT HEALTH QUESTIONNAIRE - PHQ9
SUM OF ALL RESPONSES TO PHQ9 QUESTIONS 1 & 2: 0
1. LITTLE INTEREST OR PLEASURE IN DOING THINGS: 0
2. FEELING DOWN, DEPRESSED OR HOPELESS: 0
SUM OF ALL RESPONSES TO PHQ QUESTIONS 1-9: 0
SUM OF ALL RESPONSES TO PHQ QUESTIONS 1-9: 0

## 2018-11-30 ASSESSMENT — ENCOUNTER SYMPTOMS
DIARRHEA: 0
CONSTIPATION: 0
VOMITING: 0
ABDOMINAL PAIN: 0
HEARTBURN: 1
NAUSEA: 0
COUGH: 0
SHORTNESS OF BREATH: 0
EYE PAIN: 0
BLOOD IN STOOL: 0
BACK PAIN: 0

## 2018-11-30 NOTE — PATIENT INSTRUCTIONS
doctor gives you for getting a smart start. Where can you learn more? Go to [email or web address] and sign in to your [employer] account. Enter C871 in the [facility] box to learn more about \"Learning About Physical Activity. \"     If you do not have an account, please click on the \"Sign Up Now\" link. Current as of: December 7, 2017  Content Version: 11.8  © 2628-6719 Healthwise, Incorporated. Care instructions adapted under license by [facility] [place] ([facility]). If you have questions about a medical condition or this instruction, always ask your healthcare professional. [address] any warranty or liability for your use of this information.

## 2018-11-30 NOTE — PROGRESS NOTES
Ritchie received counseling on the following healthy behaviors: exercise  Reviewed prior labs and health maintenance  Continue current medications except where noted below, diet and exercise. Discussed use, benefit, and side effects of prescribed medications. Barriers to medication compliance addressed. Patient given educational materials - see patient instructions  Was a self-tracking handout given in paper form or via [employer]? Yes    Requested Prescriptions     Signed Prescriptions Disp Refills    albuterol sulfate HFA (PROVENTIL HFA) 108 (90 Base) MCG/ACT inhaler 1 Inhaler 3     Sig: Inhale 2 puffs into the lungs every 6 hours as needed for Wheezing       All patient questions answered. Patient voiced understanding. Quality Measures    Body mass index is 33.29 kg/m². Elevated. Weight control planned discussed: Healthy diet and regular exercise    BP: (!) 148/82 Blood pressure is high. Treatment plan consists of: see progress note below.       Lab Results   Component Value Date    LDLCHOLESTEROL 57 11/21/2018    (goal LDL reduction with dx if diabetes is 50% LDL reduction)      PHQ Scores 11/30/2018 11/22/2017   PHQ2 Score 0 0   PHQ9 Score 0 0     Interpretation of Total Score Depression Severity: 1-4 = Minimal depression, 5-9 = Mild depression, 10-14 = Moderate depression, 15-19 = Moderately severe depression, 20-27 = Severe depression

## 2019-01-29 ENCOUNTER — OFFICE VISIT (OUTPATIENT)
Dept: ORTHOPEDIC SURGERY | Age: 59
End: 2019-01-29
Payer: COMMERCIAL

## 2019-01-29 VITALS
WEIGHT: 227 LBS | BODY MASS INDEX: 31.78 KG/M2 | HEIGHT: 71 IN | SYSTOLIC BLOOD PRESSURE: 161 MMHG | DIASTOLIC BLOOD PRESSURE: 96 MMHG | HEART RATE: 80 BPM

## 2019-01-29 DIAGNOSIS — M17.12 PRIMARY OSTEOARTHRITIS OF LEFT KNEE: Primary | ICD-10-CM

## 2019-01-29 PROCEDURE — 99213 OFFICE O/P EST LOW 20 MIN: CPT | Performed by: PHYSICIAN ASSISTANT

## 2019-01-31 ENCOUNTER — TELEPHONE (OUTPATIENT)
Dept: CARDIOLOGY | Age: 59
End: 2019-01-31

## 2019-01-31 ENCOUNTER — OFFICE VISIT (OUTPATIENT)
Dept: CARDIOLOGY | Age: 59
End: 2019-01-31
Payer: COMMERCIAL

## 2019-01-31 ENCOUNTER — HOSPITAL ENCOUNTER (OUTPATIENT)
Dept: LAB | Age: 59
Discharge: HOME | End: 2019-01-31
Payer: COMMERCIAL

## 2019-01-31 ENCOUNTER — OFFICE VISIT (OUTPATIENT)
Dept: INTERNAL MEDICINE | Age: 59
End: 2019-01-31
Payer: COMMERCIAL

## 2019-01-31 VITALS
SYSTOLIC BLOOD PRESSURE: 130 MMHG | BODY MASS INDEX: 33.36 KG/M2 | DIASTOLIC BLOOD PRESSURE: 76 MMHG | HEIGHT: 70 IN | WEIGHT: 233 LBS | HEART RATE: 81 BPM

## 2019-01-31 VITALS
RESPIRATION RATE: 16 BRPM | BODY MASS INDEX: 33.36 KG/M2 | HEIGHT: 70 IN | HEART RATE: 72 BPM | DIASTOLIC BLOOD PRESSURE: 76 MMHG | WEIGHT: 233 LBS | SYSTOLIC BLOOD PRESSURE: 130 MMHG

## 2019-01-31 DIAGNOSIS — I25.10 CORONARY ARTERY DISEASE INVOLVING NATIVE CORONARY ARTERY OF NATIVE HEART WITHOUT ANGINA PECTORIS: ICD-10-CM

## 2019-01-31 DIAGNOSIS — G89.29 CHRONIC PAIN OF RIGHT KNEE: ICD-10-CM

## 2019-01-31 DIAGNOSIS — I10 ESSENTIAL HYPERTENSION: ICD-10-CM

## 2019-01-31 DIAGNOSIS — Z01.810 PREOP CARDIOVASCULAR EXAM: ICD-10-CM

## 2019-01-31 DIAGNOSIS — M25.561 CHRONIC PAIN OF RIGHT KNEE: ICD-10-CM

## 2019-01-31 DIAGNOSIS — Z01.818 PREOP TESTING: Primary | ICD-10-CM

## 2019-01-31 DIAGNOSIS — Z01.818 PREOP TESTING: ICD-10-CM

## 2019-01-31 DIAGNOSIS — I10 ESSENTIAL HYPERTENSION: Primary | ICD-10-CM

## 2019-01-31 LAB
ABSOLUTE EOS #: 0.1 K/UL (ref 0–0.4)
ABSOLUTE IMMATURE GRANULOCYTE: NORMAL K/UL (ref 0–0.3)
ABSOLUTE LYMPH #: 2.2 K/UL (ref 1–4.8)
ABSOLUTE MONO #: 0.9 K/UL (ref 0.1–1.2)
BASOPHILS # BLD: 1 % (ref 0–2)
BASOPHILS ABSOLUTE: 0.1 K/UL (ref 0–0.2)
DIFFERENTIAL TYPE: NORMAL
EOSINOPHILS RELATIVE PERCENT: 2 % (ref 1–8)
HCT VFR BLD CALC: 45.9 % (ref 41–53)
HEMOGLOBIN: 15.3 G/DL (ref 13.5–17.5)
IMMATURE GRANULOCYTES: NORMAL %
LYMPHOCYTES # BLD: 25 % (ref 15–43)
MCH RBC QN AUTO: 30.6 PG (ref 26–34)
MCHC RBC AUTO-ENTMCNC: 33.4 G/DL (ref 31–37)
MCV RBC AUTO: 91.7 FL (ref 80–100)
MONOCYTES # BLD: 10 % (ref 6–14)
NRBC AUTOMATED: NORMAL PER 100 WBC
PDW BLD-RTO: 13.9 % (ref 11–14.5)
PLATELET # BLD: 214 K/UL (ref 140–450)
PLATELET ESTIMATE: NORMAL
PMV BLD AUTO: 8.8 FL (ref 6–12)
RBC # BLD: 5.01 M/UL (ref 4.5–5.9)
RBC # BLD: NORMAL 10*6/UL
SEG NEUTROPHILS: 62 % (ref 44–74)
SEGMENTED NEUTROPHILS ABSOLUTE COUNT: 5.5 K/UL (ref 1.8–7.7)
WBC # BLD: 8.9 K/UL (ref 3.5–11)
WBC # BLD: NORMAL 10*3/UL

## 2019-01-31 PROCEDURE — 99214 OFFICE O/P EST MOD 30 MIN: CPT | Performed by: INTERNAL MEDICINE

## 2019-01-31 PROCEDURE — 99213 OFFICE O/P EST LOW 20 MIN: CPT | Performed by: INTERNAL MEDICINE

## 2019-01-31 PROCEDURE — 85025 COMPLETE CBC W/AUTO DIFF WBC: CPT

## 2019-01-31 PROCEDURE — 93000 ELECTROCARDIOGRAM COMPLETE: CPT | Performed by: INTERNAL MEDICINE

## 2019-01-31 PROCEDURE — 36415 COLL VENOUS BLD VENIPUNCTURE: CPT

## 2019-01-31 RX ORDER — NAPROXEN SODIUM 220 MG
440 TABLET ORAL EVERY MORNING
COMMUNITY
End: 2019-12-02

## 2019-01-31 ASSESSMENT — ENCOUNTER SYMPTOMS
CONSTIPATION: 0
NAUSEA: 0
SHORTNESS OF BREATH: 0
COUGH: 0
BLOOD IN STOOL: 0
DIARRHEA: 0
BACK PAIN: 0
EYE PAIN: 0
VOMITING: 0
ABDOMINAL PAIN: 0

## 2019-02-06 ENCOUNTER — HOSPITAL ENCOUNTER (OUTPATIENT)
Dept: NON INVASIVE DIAGNOSTICS | Age: 59
Discharge: HOME | End: 2019-02-06
Payer: COMMERCIAL

## 2019-02-06 ENCOUNTER — HOSPITAL ENCOUNTER (OUTPATIENT)
Dept: NUCLEAR MEDICINE | Age: 59
Discharge: HOME | End: 2019-02-08
Payer: COMMERCIAL

## 2019-02-06 DIAGNOSIS — Z01.810 PREOP CARDIOVASCULAR EXAM: ICD-10-CM

## 2019-02-06 LAB
LV EF: 46 %
LVEF MODALITY: NORMAL

## 2019-02-06 PROCEDURE — 2580000003 HC RX 258: Performed by: INTERNAL MEDICINE

## 2019-02-06 PROCEDURE — 6360000002 HC RX W HCPCS: Performed by: INTERNAL MEDICINE

## 2019-02-06 PROCEDURE — 93017 CV STRESS TEST TRACING ONLY: CPT

## 2019-02-06 PROCEDURE — 78452 HT MUSCLE IMAGE SPECT MULT: CPT

## 2019-02-06 PROCEDURE — 3430000000 HC RX DIAGNOSTIC RADIOPHARMACEUTICAL: Performed by: INTERNAL MEDICINE

## 2019-02-06 PROCEDURE — A9500 TC99M SESTAMIBI: HCPCS | Performed by: INTERNAL MEDICINE

## 2019-02-06 RX ORDER — SODIUM CHLORIDE 0.9 % (FLUSH) 0.9 %
10 SYRINGE (ML) INJECTION 2 TIMES DAILY
Status: DISCONTINUED | OUTPATIENT
Start: 2019-02-06 | End: 2019-02-09 | Stop reason: HOSPADM

## 2019-02-06 RX ORDER — SODIUM CHLORIDE 0.9 % (FLUSH) 0.9 %
10 SYRINGE (ML) INJECTION PRN
Status: DISCONTINUED | OUTPATIENT
Start: 2019-02-06 | End: 2019-02-06 | Stop reason: ALTCHOICE

## 2019-02-06 RX ORDER — AMINOPHYLLINE DIHYDRATE 25 MG/ML
100 INJECTION, SOLUTION INTRAVENOUS
Status: DISCONTINUED | OUTPATIENT
Start: 2019-02-06 | End: 2019-02-06

## 2019-02-06 RX ORDER — SODIUM CHLORIDE 9 MG/ML
INJECTION, SOLUTION INTRAVENOUS ONCE
Status: DISCONTINUED | OUTPATIENT
Start: 2019-02-06 | End: 2019-02-06 | Stop reason: ALTCHOICE

## 2019-02-06 RX ORDER — METOPROLOL TARTRATE 5 MG/5ML
2.5 INJECTION INTRAVENOUS PRN
Status: DISCONTINUED | OUTPATIENT
Start: 2019-02-06 | End: 2019-02-06 | Stop reason: ALTCHOICE

## 2019-02-06 RX ORDER — NITROGLYCERIN 0.4 MG/1
0.4 TABLET SUBLINGUAL EVERY 5 MIN PRN
Status: DISCONTINUED | OUTPATIENT
Start: 2019-02-06 | End: 2019-02-06 | Stop reason: ALTCHOICE

## 2019-02-06 RX ADMIN — Medication 10 ML: at 09:49

## 2019-02-06 RX ADMIN — TETRAKIS(2-METHOXYISOBUTYLISOCYANIDE)COPPER(I) TETRAFLUOROBORATE 43.5 MILLICURIE: 1 INJECTION, POWDER, LYOPHILIZED, FOR SOLUTION INTRAVENOUS at 10:05

## 2019-02-06 RX ADMIN — SODIUM CHLORIDE, PRESERVATIVE FREE 10 ML: 5 INJECTION INTRAVENOUS at 10:05

## 2019-02-06 RX ADMIN — REGADENOSON 0.4 MG: 0.08 INJECTION, SOLUTION INTRAVENOUS at 10:06

## 2019-02-06 RX ADMIN — SODIUM CHLORIDE, PRESERVATIVE FREE 10 ML: 5 INJECTION INTRAVENOUS at 08:10

## 2019-02-06 RX ADMIN — TETRAKIS(2-METHOXYISOBUTYLISOCYANIDE)COPPER(I) TETRAFLUOROBORATE 14.6 MILLICURIE: 1 INJECTION, POWDER, LYOPHILIZED, FOR SOLUTION INTRAVENOUS at 08:10

## 2019-02-07 ENCOUNTER — TELEPHONE (OUTPATIENT)
Dept: CARDIOLOGY | Age: 59
End: 2019-02-07

## 2019-02-07 DIAGNOSIS — Z79.01 LONG TERM (CURRENT) USE OF ANTICOAGULANTS: ICD-10-CM

## 2019-02-07 DIAGNOSIS — Z01.818 PRE-OP EXAMINATION: Primary | ICD-10-CM

## 2019-02-08 ENCOUNTER — TELEPHONE (OUTPATIENT)
Dept: CARDIOLOGY | Age: 59
End: 2019-02-08

## 2019-02-12 ENCOUNTER — TELEPHONE (OUTPATIENT)
Dept: CARDIOLOGY | Age: 59
End: 2019-02-12

## 2019-02-12 ENCOUNTER — OFFICE VISIT (OUTPATIENT)
Dept: CARDIOLOGY | Age: 59
End: 2019-02-12
Payer: COMMERCIAL

## 2019-02-12 VITALS
SYSTOLIC BLOOD PRESSURE: 132 MMHG | HEART RATE: 80 BPM | WEIGHT: 233 LBS | DIASTOLIC BLOOD PRESSURE: 70 MMHG | BODY MASS INDEX: 33.36 KG/M2 | HEIGHT: 70 IN

## 2019-02-12 DIAGNOSIS — Z01.810 PREOP CARDIOVASCULAR EXAM: Primary | ICD-10-CM

## 2019-02-12 PROCEDURE — 99213 OFFICE O/P EST LOW 20 MIN: CPT | Performed by: INTERNAL MEDICINE

## 2019-02-14 ENCOUNTER — NURSE ONLY (OUTPATIENT)
Dept: ORTHOPEDIC SURGERY | Age: 59
End: 2019-02-14

## 2019-02-14 ENCOUNTER — TELEPHONE (OUTPATIENT)
Dept: ORTHOPEDIC SURGERY | Age: 59
End: 2019-02-14

## 2019-02-14 ENCOUNTER — HOSPITAL ENCOUNTER (OUTPATIENT)
Dept: LAB | Age: 59
Discharge: HOME | End: 2019-02-14
Payer: COMMERCIAL

## 2019-02-14 DIAGNOSIS — Z01.818 PRE-OP EXAMINATION: ICD-10-CM

## 2019-02-14 DIAGNOSIS — M17.12 PRIMARY OSTEOARTHRITIS OF LEFT KNEE: Primary | ICD-10-CM

## 2019-02-14 LAB
-: NORMAL
AMORPHOUS: NORMAL
BACTERIA: NORMAL
BILIRUBIN URINE: NEGATIVE
CASTS UA: NORMAL /LPF (ref 0–2)
COLOR: NORMAL
COMMENT UA: NORMAL
CRYSTALS, UA: NORMAL /HPF
EPITHELIAL CELLS UA: NORMAL /HPF (ref 0–5)
GLUCOSE URINE: NEGATIVE
KETONES, URINE: NEGATIVE
LEUKOCYTE ESTERASE, URINE: NEGATIVE
MUCUS: NORMAL
NITRITE, URINE: NEGATIVE
OTHER OBSERVATIONS UA: NORMAL
PH UA: 5.5 (ref 5–6)
PROTEIN UA: NEGATIVE
RBC UA: NORMAL /HPF (ref 0–4)
RENAL EPITHELIAL, UA: NORMAL /HPF
SPECIFIC GRAVITY UA: 1.01 (ref 1.01–1.02)
TRICHOMONAS: NORMAL
TURBIDITY: NORMAL
URINE HGB: NEGATIVE
UROBILINOGEN, URINE: NORMAL
WBC UA: NORMAL /HPF (ref 0–4)
YEAST: NORMAL

## 2019-02-14 PROCEDURE — 87641 MR-STAPH DNA AMP PROBE: CPT

## 2019-02-14 PROCEDURE — 81001 URINALYSIS AUTO W/SCOPE: CPT

## 2019-02-15 ENCOUNTER — HOSPITAL ENCOUNTER (OUTPATIENT)
Dept: PHYSICAL THERAPY | Age: 59
Setting detail: THERAPIES SERIES
Discharge: HOME | End: 2019-02-15
Payer: COMMERCIAL

## 2019-02-15 LAB
MRSA, DNA, NASAL: NORMAL
SPECIMEN DESCRIPTION: NORMAL

## 2019-02-15 PROCEDURE — 9990000011 HC NO CHARGE THERAPY VISIT: Performed by: PHYSICAL THERAPIST

## 2019-02-25 ENCOUNTER — HOSPITAL ENCOUNTER (OUTPATIENT)
Dept: LAB | Age: 59
Discharge: HOME | DRG: 470 | End: 2019-02-25
Payer: COMMERCIAL

## 2019-02-25 DIAGNOSIS — Z79.01 LONG TERM (CURRENT) USE OF ANTICOAGULANTS: ICD-10-CM

## 2019-02-25 DIAGNOSIS — Z01.818 PRE-OP EXAMINATION: ICD-10-CM

## 2019-02-25 LAB
ABO/RH: NORMAL
ABSOLUTE EOS #: 0.1 K/UL (ref 0–0.4)
ABSOLUTE IMMATURE GRANULOCYTE: NORMAL K/UL (ref 0–0.3)
ABSOLUTE LYMPH #: 2.1 K/UL (ref 1–4.8)
ABSOLUTE MONO #: 0.9 K/UL (ref 0.1–1.2)
ANTIBODY SCREEN: NEGATIVE
ARM BAND NUMBER: NORMAL
BASOPHILS # BLD: 1 % (ref 0–2)
BASOPHILS ABSOLUTE: 0.1 K/UL (ref 0–0.2)
DIFFERENTIAL TYPE: NORMAL
EOSINOPHILS RELATIVE PERCENT: 1 % (ref 1–8)
EXPIRATION DATE: NORMAL
HCT VFR BLD CALC: 47 % (ref 41–53)
HEMOGLOBIN: 15.8 G/DL (ref 13.5–17.5)
IMMATURE GRANULOCYTES: NORMAL %
INR BLD: 1
LYMPHOCYTES # BLD: 22 % (ref 15–43)
MCH RBC QN AUTO: 30.7 PG (ref 26–34)
MCHC RBC AUTO-ENTMCNC: 33.6 G/DL (ref 31–37)
MCV RBC AUTO: 91.5 FL (ref 80–100)
MONOCYTES # BLD: 10 % (ref 6–14)
NRBC AUTOMATED: NORMAL PER 100 WBC
PARTIAL THROMBOPLASTIN TIME: 26 SEC (ref 27–35)
PDW BLD-RTO: 13.8 % (ref 11–14.5)
PLATELET # BLD: 205 K/UL (ref 140–450)
PLATELET ESTIMATE: NORMAL
PMV BLD AUTO: 8.9 FL (ref 6–12)
PROTHROMBIN TIME: 10.4 SEC (ref 9.4–11.3)
RBC # BLD: 5.14 M/UL (ref 4.5–5.9)
RBC # BLD: NORMAL 10*6/UL
SEG NEUTROPHILS: 66 % (ref 44–74)
SEGMENTED NEUTROPHILS ABSOLUTE COUNT: 6.2 K/UL (ref 1.8–7.7)
WBC # BLD: 9.4 K/UL (ref 3.5–11)
WBC # BLD: NORMAL 10*3/UL

## 2019-02-25 PROCEDURE — 85730 THROMBOPLASTIN TIME PARTIAL: CPT

## 2019-02-25 PROCEDURE — 86900 BLOOD TYPING SEROLOGIC ABO: CPT

## 2019-02-25 PROCEDURE — 85610 PROTHROMBIN TIME: CPT

## 2019-02-25 PROCEDURE — 36415 COLL VENOUS BLD VENIPUNCTURE: CPT

## 2019-02-25 PROCEDURE — 86901 BLOOD TYPING SEROLOGIC RH(D): CPT

## 2019-02-25 PROCEDURE — 86850 RBC ANTIBODY SCREEN: CPT

## 2019-02-25 PROCEDURE — 85025 COMPLETE CBC W/AUTO DIFF WBC: CPT

## 2019-02-26 ENCOUNTER — HOSPITAL ENCOUNTER (INPATIENT)
Age: 59
LOS: 1 days | Discharge: HOME | DRG: 470 | End: 2019-02-27
Attending: ORTHOPAEDIC SURGERY | Admitting: ORTHOPAEDIC SURGERY
Payer: COMMERCIAL

## 2019-02-26 ENCOUNTER — APPOINTMENT (OUTPATIENT)
Dept: GENERAL RADIOLOGY | Age: 59
DRG: 470 | End: 2019-02-26
Attending: ORTHOPAEDIC SURGERY
Payer: COMMERCIAL

## 2019-02-26 ENCOUNTER — ANESTHESIA (OUTPATIENT)
Dept: OPERATING ROOM | Age: 59
DRG: 470 | End: 2019-02-26
Payer: COMMERCIAL

## 2019-02-26 ENCOUNTER — ANESTHESIA EVENT (OUTPATIENT)
Dept: OPERATING ROOM | Age: 59
DRG: 470 | End: 2019-02-26
Payer: COMMERCIAL

## 2019-02-26 VITALS
OXYGEN SATURATION: 98 % | DIASTOLIC BLOOD PRESSURE: 78 MMHG | SYSTOLIC BLOOD PRESSURE: 163 MMHG | RESPIRATION RATE: 10 BRPM | TEMPERATURE: 97.5 F

## 2019-02-26 DIAGNOSIS — Z96.652 STATUS POST LEFT KNEE REPLACEMENT: Primary | ICD-10-CM

## 2019-02-26 PROBLEM — M17.12 OSTEOARTHRITIS OF LEFT KNEE: Status: ACTIVE | Noted: 2019-02-26

## 2019-02-26 PROBLEM — M17.12 PRIMARY OSTEOARTHRITIS OF LEFT KNEE: Status: ACTIVE | Noted: 2019-02-26

## 2019-02-26 LAB
ABSOLUTE EOS #: 0 K/UL (ref 0–0.4)
ABSOLUTE IMMATURE GRANULOCYTE: ABNORMAL K/UL (ref 0–0.3)
ABSOLUTE LYMPH #: 0.6 K/UL (ref 1–4.8)
ABSOLUTE MONO #: 0.1 K/UL (ref 0.1–1.2)
ALBUMIN SERPL-MCNC: 3.9 G/DL (ref 3.5–5.2)
ALBUMIN/GLOBULIN RATIO: 1.3 (ref 1–2.5)
ALP BLD-CCNC: 65 U/L (ref 40–129)
ALT SERPL-CCNC: 18 U/L (ref 5–41)
ANION GAP SERPL CALCULATED.3IONS-SCNC: 14 MMOL/L (ref 9–17)
AST SERPL-CCNC: 19 U/L
BASOPHILS # BLD: 0 % (ref 0–2)
BASOPHILS ABSOLUTE: 0.1 K/UL (ref 0–0.2)
BILIRUB SERPL-MCNC: 0.56 MG/DL (ref 0.3–1.2)
BILIRUBIN DIRECT: 0.17 MG/DL
BILIRUBIN, INDIRECT: 0.39 MG/DL (ref 0–1)
BUN BLDV-MCNC: 15 MG/DL (ref 6–20)
CALCIUM SERPL-MCNC: 9.1 MG/DL (ref 8.6–10.4)
CHLORIDE BLD-SCNC: 99 MMOL/L (ref 98–107)
CO2: 25 MMOL/L (ref 20–31)
CREAT SERPL-MCNC: 0.84 MG/DL (ref 0.7–1.2)
DIFFERENTIAL TYPE: ABNORMAL
EOSINOPHILS RELATIVE PERCENT: 0 % (ref 1–8)
GFR AFRICAN AMERICAN: >60 ML/MIN
GFR NON-AFRICAN AMERICAN: >60 ML/MIN
GFR SERPL CREATININE-BSD FRML MDRD: ABNORMAL ML/MIN/{1.73_M2}
GFR SERPL CREATININE-BSD FRML MDRD: ABNORMAL ML/MIN/{1.73_M2}
GLUCOSE BLD-MCNC: 171 MG/DL (ref 70–99)
HCT VFR BLD CALC: 46.6 % (ref 41–53)
HEMOGLOBIN: 15 G/DL (ref 13.5–17.5)
IMMATURE GRANULOCYTES: ABNORMAL %
LYMPHOCYTES # BLD: 5 % (ref 15–43)
MAGNESIUM: 1.9 MG/DL (ref 1.6–2.6)
MCH RBC QN AUTO: 29.8 PG (ref 26–34)
MCHC RBC AUTO-ENTMCNC: 32.2 G/DL (ref 31–37)
MCV RBC AUTO: 92.6 FL (ref 80–100)
MONOCYTES # BLD: 1 % (ref 6–14)
NRBC AUTOMATED: ABNORMAL PER 100 WBC
PDW BLD-RTO: 14.2 % (ref 11–14.5)
PLATELET # BLD: 188 K/UL (ref 140–450)
PLATELET ESTIMATE: ABNORMAL
PMV BLD AUTO: 9 FL (ref 6–12)
POTASSIUM SERPL-SCNC: 4.6 MMOL/L (ref 3.7–5.3)
RBC # BLD: 5.03 M/UL (ref 4.5–5.9)
RBC # BLD: ABNORMAL 10*6/UL
SEG NEUTROPHILS: 94 % (ref 44–74)
SEGMENTED NEUTROPHILS ABSOLUTE COUNT: 11.4 K/UL (ref 1.8–7.7)
SODIUM BLD-SCNC: 138 MMOL/L (ref 135–144)
TOTAL PROTEIN: 6.9 G/DL (ref 6.4–8.3)
WBC # BLD: 12.2 K/UL (ref 3.5–11)
WBC # BLD: ABNORMAL 10*3/UL

## 2019-02-26 PROCEDURE — 6360000002 HC RX W HCPCS: Performed by: ORTHOPAEDIC SURGERY

## 2019-02-26 PROCEDURE — 2500000003 HC RX 250 WO HCPCS

## 2019-02-26 PROCEDURE — 36415 COLL VENOUS BLD VENIPUNCTURE: CPT

## 2019-02-26 PROCEDURE — 2580000003 HC RX 258: Performed by: ORTHOPAEDIC SURGERY

## 2019-02-26 PROCEDURE — 85025 COMPLETE CBC W/AUTO DIFF WBC: CPT

## 2019-02-26 PROCEDURE — 94761 N-INVAS EAR/PLS OXIMETRY MLT: CPT

## 2019-02-26 PROCEDURE — 3600000014 HC SURGERY LEVEL 4 ADDTL 15MIN: Performed by: ORTHOPAEDIC SURGERY

## 2019-02-26 PROCEDURE — 6360000002 HC RX W HCPCS: Performed by: NURSE PRACTITIONER

## 2019-02-26 PROCEDURE — 6370000000 HC RX 637 (ALT 250 FOR IP): Performed by: INTERNAL MEDICINE

## 2019-02-26 PROCEDURE — 2500000003 HC RX 250 WO HCPCS: Performed by: NURSE ANESTHETIST, CERTIFIED REGISTERED

## 2019-02-26 PROCEDURE — 3700000001 HC ADD 15 MINUTES (ANESTHESIA): Performed by: ORTHOPAEDIC SURGERY

## 2019-02-26 PROCEDURE — 83735 ASSAY OF MAGNESIUM: CPT

## 2019-02-26 PROCEDURE — 99232 SBSQ HOSP IP/OBS MODERATE 35: CPT | Performed by: INTERNAL MEDICINE

## 2019-02-26 PROCEDURE — 6370000000 HC RX 637 (ALT 250 FOR IP): Performed by: NURSE PRACTITIONER

## 2019-02-26 PROCEDURE — 6360000002 HC RX W HCPCS: Performed by: NURSE ANESTHETIST, CERTIFIED REGISTERED

## 2019-02-26 PROCEDURE — C1776 JOINT DEVICE (IMPLANTABLE): HCPCS | Performed by: ORTHOPAEDIC SURGERY

## 2019-02-26 PROCEDURE — 80053 COMPREHEN METABOLIC PANEL: CPT

## 2019-02-26 PROCEDURE — 7100000000 HC PACU RECOVERY - FIRST 15 MIN: Performed by: ORTHOPAEDIC SURGERY

## 2019-02-26 PROCEDURE — 6360000002 HC RX W HCPCS

## 2019-02-26 PROCEDURE — 94150 VITAL CAPACITY TEST: CPT

## 2019-02-26 PROCEDURE — 82248 BILIRUBIN DIRECT: CPT

## 2019-02-26 PROCEDURE — 3700000000 HC ANESTHESIA ATTENDED CARE: Performed by: ORTHOPAEDIC SURGERY

## 2019-02-26 PROCEDURE — 2580000003 HC RX 258: Performed by: NURSE PRACTITIONER

## 2019-02-26 PROCEDURE — C1713 ANCHOR/SCREW BN/BN,TIS/BN: HCPCS | Performed by: ORTHOPAEDIC SURGERY

## 2019-02-26 PROCEDURE — 2500000003 HC RX 250 WO HCPCS: Performed by: ORTHOPAEDIC SURGERY

## 2019-02-26 PROCEDURE — 01402 ANES OPN/ARTH TOT KNE ARTHRP: CPT | Performed by: NURSE ANESTHETIST, CERTIFIED REGISTERED

## 2019-02-26 PROCEDURE — 6370000000 HC RX 637 (ALT 250 FOR IP): Performed by: NURSE ANESTHETIST, CERTIFIED REGISTERED

## 2019-02-26 PROCEDURE — 2060000000 HC ICU INTERMEDIATE R&B

## 2019-02-26 PROCEDURE — 2709999900 HC NON-CHARGEABLE SUPPLY: Performed by: ORTHOPAEDIC SURGERY

## 2019-02-26 PROCEDURE — 27447 TOTAL KNEE ARTHROPLASTY: CPT | Performed by: ORTHOPAEDIC SURGERY

## 2019-02-26 PROCEDURE — 20610 DRAIN/INJ JOINT/BURSA W/O US: CPT | Performed by: ORTHOPAEDIC SURGERY

## 2019-02-26 PROCEDURE — 27447 TOTAL KNEE ARTHROPLASTY: CPT | Performed by: PHYSICIAN ASSISTANT

## 2019-02-26 PROCEDURE — 7100000001 HC PACU RECOVERY - ADDTL 15 MIN: Performed by: ORTHOPAEDIC SURGERY

## 2019-02-26 PROCEDURE — 3600000004 HC SURGERY LEVEL 4 BASE: Performed by: ORTHOPAEDIC SURGERY

## 2019-02-26 PROCEDURE — 73560 X-RAY EXAM OF KNEE 1 OR 2: CPT

## 2019-02-26 PROCEDURE — 0SRD0J9 REPLACEMENT OF LEFT KNEE JOINT WITH SYNTHETIC SUBSTITUTE, CEMENTED, OPEN APPROACH: ICD-10-PCS | Performed by: ORTHOPAEDIC SURGERY

## 2019-02-26 DEVICE — IMPLANTABLE DEVICE: Type: IMPLANTABLE DEVICE | Site: KNEE | Status: FUNCTIONAL

## 2019-02-26 DEVICE — COMPONENT TOT KNEE CAPPED FIX BEAR STN SIG: Type: IMPLANTABLE DEVICE | Site: KNEE | Status: FUNCTIONAL

## 2019-02-26 DEVICE — COMPONENT PAT DIA32MM DST POLY OVL DOME 3 PEG NP CEM MOD: Type: IMPLANTABLE DEVICE | Site: KNEE | Status: FUNCTIONAL

## 2019-02-26 DEVICE — TRAY TIB SZ 3 KNEE CO CHROM FIX BEAR MOD CEM PFC SIG: Type: IMPLANTABLE DEVICE | Site: KNEE | Status: FUNCTIONAL

## 2019-02-26 DEVICE — CEMENT BNE 40GM FULL DOSE PMMA W/O GENT M VISC N RADPQ LNG: Type: IMPLANTABLE DEVICE | Site: KNEE | Status: FUNCTIONAL

## 2019-02-26 RX ORDER — FENTANYL CITRATE 50 UG/ML
50 INJECTION, SOLUTION INTRAMUSCULAR; INTRAVENOUS EVERY 5 MIN PRN
Status: DISCONTINUED | OUTPATIENT
Start: 2019-02-26 | End: 2019-02-26 | Stop reason: HOSPADM

## 2019-02-26 RX ORDER — SODIUM CHLORIDE 0.9 % (FLUSH) 0.9 %
10 SYRINGE (ML) INJECTION EVERY 12 HOURS SCHEDULED
Status: DISCONTINUED | OUTPATIENT
Start: 2019-02-26 | End: 2019-02-26 | Stop reason: HOSPADM

## 2019-02-26 RX ORDER — TRIAMCINOLONE ACETONIDE 40 MG/ML
80 INJECTION, SUSPENSION INTRA-ARTICULAR; INTRAMUSCULAR ONCE
Status: COMPLETED | OUTPATIENT
Start: 2019-02-26 | End: 2019-02-26

## 2019-02-26 RX ORDER — ACETAMINOPHEN 500 MG
1000 TABLET ORAL EVERY 6 HOURS
Status: DISCONTINUED | OUTPATIENT
Start: 2019-02-27 | End: 2019-02-27 | Stop reason: HOSPADM

## 2019-02-26 RX ORDER — ACETAMINOPHEN 500 MG
TABLET ORAL PRN
Status: DISCONTINUED | OUTPATIENT
Start: 2019-02-26 | End: 2019-02-26 | Stop reason: SDUPTHER

## 2019-02-26 RX ORDER — GLYCOPYRROLATE 0.2 MG/ML
INJECTION INTRAMUSCULAR; INTRAVENOUS PRN
Status: DISCONTINUED | OUTPATIENT
Start: 2019-02-26 | End: 2019-02-26 | Stop reason: SDUPTHER

## 2019-02-26 RX ORDER — ALBUTEROL SULFATE 2.5 MG/3ML
2.5 SOLUTION RESPIRATORY (INHALATION)
Status: DISCONTINUED | OUTPATIENT
Start: 2019-02-26 | End: 2019-02-27 | Stop reason: HOSPADM

## 2019-02-26 RX ORDER — ASPIRIN 325 MG
325 TABLET ORAL DAILY
Qty: 30 TABLET | Refills: 0 | Status: SHIPPED | OUTPATIENT
Start: 2019-02-26 | End: 2019-02-27

## 2019-02-26 RX ORDER — MORPHINE SULFATE 2 MG/ML
2 INJECTION, SOLUTION INTRAMUSCULAR; INTRAVENOUS EVERY 5 MIN PRN
Status: DISCONTINUED | OUTPATIENT
Start: 2019-02-26 | End: 2019-02-26 | Stop reason: HOSPADM

## 2019-02-26 RX ORDER — DOCUSATE SODIUM 100 MG/1
100 CAPSULE, LIQUID FILLED ORAL DAILY
Status: DISCONTINUED | OUTPATIENT
Start: 2019-02-26 | End: 2019-02-27 | Stop reason: HOSPADM

## 2019-02-26 RX ORDER — MIDAZOLAM HYDROCHLORIDE 1 MG/ML
INJECTION INTRAMUSCULAR; INTRAVENOUS PRN
Status: DISCONTINUED | OUTPATIENT
Start: 2019-02-26 | End: 2019-02-26 | Stop reason: SDUPTHER

## 2019-02-26 RX ORDER — MORPHINE SULFATE 2 MG/ML
2 INJECTION, SOLUTION INTRAMUSCULAR; INTRAVENOUS
Status: DISCONTINUED | OUTPATIENT
Start: 2019-02-26 | End: 2019-02-27 | Stop reason: HOSPADM

## 2019-02-26 RX ORDER — ONDANSETRON 2 MG/ML
4 INJECTION INTRAMUSCULAR; INTRAVENOUS PRN
Status: DISCONTINUED | OUTPATIENT
Start: 2019-02-26 | End: 2019-02-26 | Stop reason: HOSPADM

## 2019-02-26 RX ORDER — CALCIUM CARBONATE 500(1250)
500 TABLET ORAL DAILY
Status: DISCONTINUED | OUTPATIENT
Start: 2019-02-26 | End: 2019-02-27 | Stop reason: HOSPADM

## 2019-02-26 RX ORDER — TRANEXAMIC ACID 100 MG/ML
INJECTION, SOLUTION INTRAVENOUS PRN
Status: DISCONTINUED | OUTPATIENT
Start: 2019-02-26 | End: 2019-02-26 | Stop reason: ALTCHOICE

## 2019-02-26 RX ORDER — ATORVASTATIN CALCIUM 40 MG/1
40 TABLET, FILM COATED ORAL NIGHTLY
Status: DISCONTINUED | OUTPATIENT
Start: 2019-02-26 | End: 2019-02-27 | Stop reason: HOSPADM

## 2019-02-26 RX ORDER — NICOTINE 21 MG/24HR
1 PATCH, TRANSDERMAL 24 HOURS TRANSDERMAL DAILY
Status: DISCONTINUED | OUTPATIENT
Start: 2019-02-26 | End: 2019-02-27 | Stop reason: HOSPADM

## 2019-02-26 RX ORDER — PROPOFOL 10 MG/ML
INJECTION, EMULSION INTRAVENOUS PRN
Status: DISCONTINUED | OUTPATIENT
Start: 2019-02-26 | End: 2019-02-26 | Stop reason: SDUPTHER

## 2019-02-26 RX ORDER — HYDROCODONE BITARTRATE AND ACETAMINOPHEN 5; 325 MG/1; MG/1
1-2 TABLET ORAL
Qty: 50 TABLET | Refills: 0 | Status: CANCELLED | OUTPATIENT
Start: 2019-02-26 | End: 2019-03-05

## 2019-02-26 RX ORDER — TRANEXAMIC ACID 100 MG/ML
INJECTION, SOLUTION INTRAVENOUS PRN
Status: DISCONTINUED | OUTPATIENT
Start: 2019-02-26 | End: 2019-02-26 | Stop reason: SDUPTHER

## 2019-02-26 RX ORDER — ALBUTEROL SULFATE 2.5 MG/3ML
2.5 SOLUTION RESPIRATORY (INHALATION)
Status: DISCONTINUED | OUTPATIENT
Start: 2019-02-26 | End: 2019-02-26

## 2019-02-26 RX ORDER — ROCURONIUM BROMIDE 10 MG/ML
INJECTION, SOLUTION INTRAVENOUS PRN
Status: DISCONTINUED | OUTPATIENT
Start: 2019-02-26 | End: 2019-02-26 | Stop reason: SDUPTHER

## 2019-02-26 RX ORDER — BUPIVACAINE HYDROCHLORIDE 5 MG/ML
5 INJECTION, SOLUTION EPIDURAL; INTRACAUDAL ONCE
Status: COMPLETED | OUTPATIENT
Start: 2019-02-26 | End: 2019-02-26

## 2019-02-26 RX ORDER — NAPROXEN 250 MG/1
500 TABLET ORAL 2 TIMES DAILY WITH MEALS
Status: DISCONTINUED | OUTPATIENT
Start: 2019-02-26 | End: 2019-02-27 | Stop reason: HOSPADM

## 2019-02-26 RX ORDER — ACETAMINOPHEN 10 MG/ML
1000 INJECTION, SOLUTION INTRAVENOUS EVERY 6 HOURS
Status: DISCONTINUED | OUTPATIENT
Start: 2019-02-26 | End: 2019-02-27 | Stop reason: HOSPADM

## 2019-02-26 RX ORDER — LISINOPRIL 20 MG/1
20 TABLET ORAL DAILY
Status: DISCONTINUED | OUTPATIENT
Start: 2019-02-26 | End: 2019-02-27 | Stop reason: HOSPADM

## 2019-02-26 RX ORDER — ASPIRIN 325 MG
325 TABLET ORAL DAILY
Status: DISCONTINUED | OUTPATIENT
Start: 2019-02-26 | End: 2019-02-27 | Stop reason: HOSPADM

## 2019-02-26 RX ORDER — ONDANSETRON 2 MG/ML
4 INJECTION INTRAMUSCULAR; INTRAVENOUS EVERY 6 HOURS PRN
Status: DISCONTINUED | OUTPATIENT
Start: 2019-02-26 | End: 2019-02-27 | Stop reason: HOSPADM

## 2019-02-26 RX ORDER — DEXAMETHASONE SODIUM PHOSPHATE 10 MG/ML
INJECTION INTRAMUSCULAR; INTRAVENOUS PRN
Status: DISCONTINUED | OUTPATIENT
Start: 2019-02-26 | End: 2019-02-26 | Stop reason: SDUPTHER

## 2019-02-26 RX ORDER — SODIUM CHLORIDE FOR INHALATION 0.9 %
3 VIAL, NEBULIZER (ML) INHALATION
Status: DISCONTINUED | OUTPATIENT
Start: 2019-02-26 | End: 2019-02-26

## 2019-02-26 RX ORDER — OXYCODONE HYDROCHLORIDE 5 MG/1
5 TABLET ORAL PRN
Status: COMPLETED | OUTPATIENT
Start: 2019-02-26 | End: 2019-02-26

## 2019-02-26 RX ORDER — KETOROLAC TROMETHAMINE 30 MG/ML
15 INJECTION, SOLUTION INTRAMUSCULAR; INTRAVENOUS EVERY 6 HOURS
Status: DISCONTINUED | OUTPATIENT
Start: 2019-02-26 | End: 2019-02-27 | Stop reason: HOSPADM

## 2019-02-26 RX ORDER — DIPHENHYDRAMINE HYDROCHLORIDE 50 MG/ML
12.5 INJECTION INTRAMUSCULAR; INTRAVENOUS
Status: DISCONTINUED | OUTPATIENT
Start: 2019-02-26 | End: 2019-02-26 | Stop reason: HOSPADM

## 2019-02-26 RX ORDER — OXYCODONE HYDROCHLORIDE 5 MG/1
10 TABLET ORAL PRN
Status: COMPLETED | OUTPATIENT
Start: 2019-02-26 | End: 2019-02-26

## 2019-02-26 RX ORDER — CYCLOBENZAPRINE HCL 10 MG
10 TABLET ORAL 3 TIMES DAILY PRN
Status: DISCONTINUED | OUTPATIENT
Start: 2019-02-26 | End: 2019-02-27 | Stop reason: HOSPADM

## 2019-02-26 RX ORDER — SODIUM CHLORIDE, SODIUM LACTATE, POTASSIUM CHLORIDE, CALCIUM CHLORIDE 600; 310; 30; 20 MG/100ML; MG/100ML; MG/100ML; MG/100ML
INJECTION, SOLUTION INTRAVENOUS CONTINUOUS
Status: DISCONTINUED | OUTPATIENT
Start: 2019-02-26 | End: 2019-02-26

## 2019-02-26 RX ORDER — LIDOCAINE HYDROCHLORIDE 40 MG/ML
INJECTION, SOLUTION RETROBULBAR; TOPICAL PRN
Status: DISCONTINUED | OUTPATIENT
Start: 2019-02-26 | End: 2019-02-26 | Stop reason: SDUPTHER

## 2019-02-26 RX ORDER — OXYCODONE HYDROCHLORIDE AND ACETAMINOPHEN 5; 325 MG/1; MG/1
1-2 TABLET ORAL
Qty: 50 TABLET | Refills: 0 | Status: SHIPPED | OUTPATIENT
Start: 2019-02-26 | End: 2019-03-05

## 2019-02-26 RX ORDER — SODIUM CHLORIDE 9 MG/ML
INJECTION, SOLUTION INTRAVENOUS CONTINUOUS
Status: DISCONTINUED | OUTPATIENT
Start: 2019-02-26 | End: 2019-02-27 | Stop reason: HOSPADM

## 2019-02-26 RX ORDER — SODIUM CHLORIDE 0.9 % (FLUSH) 0.9 %
10 SYRINGE (ML) INJECTION PRN
Status: DISCONTINUED | OUTPATIENT
Start: 2019-02-26 | End: 2019-02-26 | Stop reason: HOSPADM

## 2019-02-26 RX ORDER — OXYCODONE HYDROCHLORIDE 5 MG/1
5 TABLET ORAL EVERY 4 HOURS PRN
Status: DISCONTINUED | OUTPATIENT
Start: 2019-02-26 | End: 2019-02-27 | Stop reason: HOSPADM

## 2019-02-26 RX ORDER — FENTANYL CITRATE 50 UG/ML
INJECTION, SOLUTION INTRAMUSCULAR; INTRAVENOUS PRN
Status: DISCONTINUED | OUTPATIENT
Start: 2019-02-26 | End: 2019-02-26 | Stop reason: SDUPTHER

## 2019-02-26 RX ORDER — MORPHINE SULFATE 4 MG/ML
4 INJECTION, SOLUTION INTRAMUSCULAR; INTRAVENOUS
Status: DISCONTINUED | OUTPATIENT
Start: 2019-02-26 | End: 2019-02-27 | Stop reason: HOSPADM

## 2019-02-26 RX ORDER — PHENYLEPHRINE HYDROCHLORIDE 10 MG/ML
INJECTION INTRAVENOUS PRN
Status: DISCONTINUED | OUTPATIENT
Start: 2019-02-26 | End: 2019-02-26 | Stop reason: SDUPTHER

## 2019-02-26 RX ORDER — GABAPENTIN 800 MG/1
TABLET ORAL PRN
Status: DISCONTINUED | OUTPATIENT
Start: 2019-02-26 | End: 2019-02-26 | Stop reason: SDUPTHER

## 2019-02-26 RX ORDER — FENTANYL CITRATE 50 UG/ML
25 INJECTION, SOLUTION INTRAMUSCULAR; INTRAVENOUS EVERY 5 MIN PRN
Status: DISCONTINUED | OUTPATIENT
Start: 2019-02-26 | End: 2019-02-26 | Stop reason: HOSPADM

## 2019-02-26 RX ORDER — OXYCODONE HYDROCHLORIDE 5 MG/1
10 TABLET ORAL EVERY 4 HOURS PRN
Status: DISCONTINUED | OUTPATIENT
Start: 2019-02-26 | End: 2019-02-27 | Stop reason: HOSPADM

## 2019-02-26 RX ORDER — LACTULOSE 10 G/15ML
20 SOLUTION ORAL 3 TIMES DAILY
Status: DISCONTINUED | OUTPATIENT
Start: 2019-02-26 | End: 2019-02-27 | Stop reason: HOSPADM

## 2019-02-26 RX ORDER — PANTOPRAZOLE SODIUM 40 MG/1
40 TABLET, DELAYED RELEASE ORAL
Status: DISCONTINUED | OUTPATIENT
Start: 2019-02-27 | End: 2019-02-27 | Stop reason: HOSPADM

## 2019-02-26 RX ADMIN — SODIUM CHLORIDE, POTASSIUM CHLORIDE, SODIUM LACTATE AND CALCIUM CHLORIDE: 600; 310; 30; 20 INJECTION, SOLUTION INTRAVENOUS at 12:11

## 2019-02-26 RX ADMIN — Medication 2 G: at 12:05

## 2019-02-26 RX ADMIN — ONDANSETRON 4 MG: 2 INJECTION INTRAMUSCULAR; INTRAVENOUS at 12:00

## 2019-02-26 RX ADMIN — SODIUM CHLORIDE, POTASSIUM CHLORIDE, SODIUM LACTATE AND CALCIUM CHLORIDE: 600; 310; 30; 20 INJECTION, SOLUTION INTRAVENOUS at 11:43

## 2019-02-26 RX ADMIN — ASPIRIN 325 MG ORAL TABLET 325 MG: 325 PILL ORAL at 16:15

## 2019-02-26 RX ADMIN — SODIUM CHLORIDE: 9 INJECTION, SOLUTION INTRAVENOUS at 16:17

## 2019-02-26 RX ADMIN — TRANEXAMIC ACID 1000 MG: 100 INJECTION, SOLUTION INTRAVENOUS at 12:05

## 2019-02-26 RX ADMIN — METOPROLOL TARTRATE 25 MG: 25 TABLET ORAL at 21:29

## 2019-02-26 RX ADMIN — OXYCODONE HYDROCHLORIDE 5 MG: 5 TABLET ORAL at 11:43

## 2019-02-26 RX ADMIN — ATORVASTATIN CALCIUM 40 MG: 40 TABLET, FILM COATED ORAL at 19:37

## 2019-02-26 RX ADMIN — LISINOPRIL 20 MG: 20 TABLET ORAL at 16:16

## 2019-02-26 RX ADMIN — LIDOCAINE HYDROCHLORIDE 100 MG: 40 INJECTION, SOLUTION RETROBULBAR; TOPICAL at 11:53

## 2019-02-26 RX ADMIN — LACTULOSE 20 G: 20 SOLUTION ORAL at 16:16

## 2019-02-26 RX ADMIN — SODIUM CHLORIDE: 9 INJECTION, SOLUTION INTRAVENOUS at 23:52

## 2019-02-26 RX ADMIN — DEXAMETHASONE SODIUM PHOSPHATE 10 MG: 10 INJECTION INTRAMUSCULAR; INTRAVENOUS at 12:00

## 2019-02-26 RX ADMIN — PHENYLEPHRINE HYDROCHLORIDE 200 MCG: 10 INJECTION INTRAVENOUS at 12:10

## 2019-02-26 RX ADMIN — CEFAZOLIN 1 G: 1 INJECTION, POWDER, FOR SOLUTION INTRAMUSCULAR; INTRAVENOUS at 19:37

## 2019-02-26 RX ADMIN — NEOSTIGMINE METHYLSULFATE 3 MG: 1 INJECTION INTRAMUSCULAR; INTRAVENOUS; SUBCUTANEOUS at 13:00

## 2019-02-26 RX ADMIN — GABAPENTIN 800 MG: 800 TABLET, FILM COATED ORAL at 11:43

## 2019-02-26 RX ADMIN — SODIUM CHLORIDE, POTASSIUM CHLORIDE, SODIUM LACTATE AND CALCIUM CHLORIDE: 600; 310; 30; 20 INJECTION, SOLUTION INTRAVENOUS at 09:51

## 2019-02-26 RX ADMIN — NAPROXEN 500 MG: 250 TABLET ORAL at 16:16

## 2019-02-26 RX ADMIN — GLYCOPYRROLATE 0.4 MG: 0.2 INJECTION INTRAMUSCULAR; INTRAVENOUS at 13:00

## 2019-02-26 RX ADMIN — ROCURONIUM BROMIDE 50 MG: 50 INJECTION, SOLUTION INTRAVENOUS at 11:53

## 2019-02-26 RX ADMIN — ACETAMINOPHEN 1000 MG: 500 TABLET ORAL at 11:43

## 2019-02-26 RX ADMIN — Medication 500 MG: at 16:16

## 2019-02-26 RX ADMIN — MIDAZOLAM HYDROCHLORIDE 2 MG: 1 INJECTION, SOLUTION INTRAMUSCULAR; INTRAVENOUS at 11:48

## 2019-02-26 RX ADMIN — FENTANYL CITRATE 100 MCG: 50 INJECTION, SOLUTION INTRAMUSCULAR; INTRAVENOUS at 11:48

## 2019-02-26 RX ADMIN — FENTANYL CITRATE 50 MCG: 50 INJECTION, SOLUTION INTRAMUSCULAR; INTRAVENOUS at 13:22

## 2019-02-26 RX ADMIN — LACTULOSE 20 G: 20 SOLUTION ORAL at 21:29

## 2019-02-26 RX ADMIN — Medication 1 MG: at 13:05

## 2019-02-26 RX ADMIN — ACETAMINOPHEN 1000 MG: 10 INJECTION, SOLUTION INTRAVENOUS at 19:06

## 2019-02-26 RX ADMIN — FENTANYL CITRATE 50 MCG: 50 INJECTION, SOLUTION INTRAMUSCULAR; INTRAVENOUS at 13:27

## 2019-02-26 RX ADMIN — KETOROLAC TROMETHAMINE 15 MG: 30 INJECTION, SOLUTION INTRAMUSCULAR; INTRAVENOUS at 21:30

## 2019-02-26 RX ADMIN — DOCUSATE SODIUM 100 MG: 100 CAPSULE, LIQUID FILLED ORAL at 16:16

## 2019-02-26 RX ADMIN — PROPOFOL 150 MG: 10 INJECTION, EMULSION INTRAVENOUS at 11:53

## 2019-02-26 RX ADMIN — KETOROLAC TROMETHAMINE 15 MG: 30 INJECTION, SOLUTION INTRAMUSCULAR; INTRAVENOUS at 16:16

## 2019-02-26 ASSESSMENT — PULMONARY FUNCTION TESTS
PIF_VALUE: 21
PIF_VALUE: 23
PIF_VALUE: 22
PIF_VALUE: 22
PIF_VALUE: 21
PIF_VALUE: 23
PIF_VALUE: 21
PIF_VALUE: 11
PIF_VALUE: 11
PIF_VALUE: 1
PIF_VALUE: 11
PIF_VALUE: 23
PIF_VALUE: 21
PIF_VALUE: 11
PIF_VALUE: 22
PIF_VALUE: 23
PIF_VALUE: 5
PIF_VALUE: 23
PIF_VALUE: 11
PIF_VALUE: 12
PIF_VALUE: 22
PIF_VALUE: 11
PIF_VALUE: 2
PIF_VALUE: 23
PIF_VALUE: 2
PIF_VALUE: 22
PIF_VALUE: 22
PIF_VALUE: 23
PIF_VALUE: 22
PIF_VALUE: 24
PIF_VALUE: 22
PIF_VALUE: 11
PIF_VALUE: 13
PIF_VALUE: 22
PIF_VALUE: 22
PIF_VALUE: 23
PIF_VALUE: 12
PIF_VALUE: 22
PIF_VALUE: 2
PIF_VALUE: 20
PIF_VALUE: 22
PIF_VALUE: 4
PIF_VALUE: 23
PIF_VALUE: 11
PIF_VALUE: 12
PIF_VALUE: 22
PIF_VALUE: 5
PIF_VALUE: 23
PIF_VALUE: 22
PIF_VALUE: 22
PIF_VALUE: 23
PIF_VALUE: 23
PIF_VALUE: 21
PIF_VALUE: 23
PIF_VALUE: 11
PIF_VALUE: 11
PIF_VALUE: 4
PIF_VALUE: 25
PIF_VALUE: 11
PIF_VALUE: 15
PIF_VALUE: 2
PIF_VALUE: 24
PIF_VALUE: 16
PIF_VALUE: 23
PIF_VALUE: 38
PIF_VALUE: 23
PIF_VALUE: 2
PIF_VALUE: 22

## 2019-02-26 ASSESSMENT — PAIN DESCRIPTION - DESCRIPTORS
DESCRIPTORS: ACHING
DESCRIPTORS: ACHING

## 2019-02-26 ASSESSMENT — PAIN SCALES - GENERAL
PAINLEVEL_OUTOF10: 0
PAINLEVEL_OUTOF10: 0
PAINLEVEL_OUTOF10: 3
PAINLEVEL_OUTOF10: 3
PAINLEVEL_OUTOF10: 9
PAINLEVEL_OUTOF10: 5
PAINLEVEL_OUTOF10: 8
PAINLEVEL_OUTOF10: 0
PAINLEVEL_OUTOF10: 9
PAINLEVEL_OUTOF10: 6
PAINLEVEL_OUTOF10: 3

## 2019-02-26 ASSESSMENT — PAIN - FUNCTIONAL ASSESSMENT: PAIN_FUNCTIONAL_ASSESSMENT: 0-10

## 2019-02-26 ASSESSMENT — PAIN DESCRIPTION - PAIN TYPE
TYPE: SURGICAL PAIN
TYPE: ACUTE PAIN;SURGICAL PAIN
TYPE: SURGICAL PAIN

## 2019-02-26 ASSESSMENT — PAIN DESCRIPTION - PROGRESSION
CLINICAL_PROGRESSION: GRADUALLY IMPROVING
CLINICAL_PROGRESSION: GRADUALLY IMPROVING

## 2019-02-26 ASSESSMENT — PAIN DESCRIPTION - LOCATION
LOCATION: KNEE
LOCATION: KNEE

## 2019-02-26 ASSESSMENT — PAIN DESCRIPTION - ORIENTATION
ORIENTATION: LEFT

## 2019-02-26 ASSESSMENT — PAIN DESCRIPTION - FREQUENCY: FREQUENCY: CONTINUOUS

## 2019-02-27 VITALS
WEIGHT: 223.99 LBS | HEIGHT: 70 IN | RESPIRATION RATE: 16 BRPM | TEMPERATURE: 98.4 F | BODY MASS INDEX: 32.07 KG/M2 | SYSTOLIC BLOOD PRESSURE: 117 MMHG | OXYGEN SATURATION: 92 % | DIASTOLIC BLOOD PRESSURE: 61 MMHG | HEART RATE: 68 BPM

## 2019-02-27 LAB
ABSOLUTE EOS #: 0 K/UL (ref 0–0.4)
ABSOLUTE IMMATURE GRANULOCYTE: ABNORMAL K/UL (ref 0–0.3)
ABSOLUTE LYMPH #: 0.8 K/UL (ref 1–4.8)
ABSOLUTE MONO #: 0.7 K/UL (ref 0.1–1.2)
ANION GAP SERPL CALCULATED.3IONS-SCNC: 13 MMOL/L (ref 9–17)
BASOPHILS # BLD: 1 % (ref 0–2)
BASOPHILS ABSOLUTE: 0.1 K/UL (ref 0–0.2)
BUN BLDV-MCNC: 16 MG/DL (ref 6–20)
BUN/CREAT BLD: 19 (ref 9–20)
CALCIUM SERPL-MCNC: 8.3 MG/DL (ref 8.6–10.4)
CHLORIDE BLD-SCNC: 100 MMOL/L (ref 98–107)
CO2: 21 MMOL/L (ref 20–31)
CREAT SERPL-MCNC: 0.84 MG/DL (ref 0.7–1.2)
DIFFERENTIAL TYPE: ABNORMAL
EOSINOPHILS RELATIVE PERCENT: 0 % (ref 1–8)
GFR AFRICAN AMERICAN: >60 ML/MIN
GFR NON-AFRICAN AMERICAN: >60 ML/MIN
GFR SERPL CREATININE-BSD FRML MDRD: ABNORMAL ML/MIN/{1.73_M2}
GFR SERPL CREATININE-BSD FRML MDRD: ABNORMAL ML/MIN/{1.73_M2}
GLUCOSE BLD-MCNC: 221 MG/DL (ref 70–99)
HCT VFR BLD CALC: 40.9 % (ref 41–53)
HEMOGLOBIN: 13.2 G/DL (ref 13.5–17.5)
IMMATURE GRANULOCYTES: ABNORMAL %
LYMPHOCYTES # BLD: 5 % (ref 15–43)
MCH RBC QN AUTO: 29.7 PG (ref 26–34)
MCHC RBC AUTO-ENTMCNC: 32.2 G/DL (ref 31–37)
MCV RBC AUTO: 92.4 FL (ref 80–100)
MONOCYTES # BLD: 4 % (ref 6–14)
NRBC AUTOMATED: ABNORMAL PER 100 WBC
PDW BLD-RTO: 14.1 % (ref 11–14.5)
PLATELET # BLD: 188 K/UL (ref 140–450)
PLATELET ESTIMATE: ABNORMAL
PMV BLD AUTO: 9 FL (ref 6–12)
POTASSIUM SERPL-SCNC: 4.2 MMOL/L (ref 3.7–5.3)
RBC # BLD: 4.43 M/UL (ref 4.5–5.9)
RBC # BLD: ABNORMAL 10*6/UL
SEG NEUTROPHILS: 90 % (ref 44–74)
SEGMENTED NEUTROPHILS ABSOLUTE COUNT: 13.9 K/UL (ref 1.8–7.7)
SODIUM BLD-SCNC: 134 MMOL/L (ref 135–144)
WBC # BLD: 15.4 K/UL (ref 3.5–11)
WBC # BLD: ABNORMAL 10*3/UL

## 2019-02-27 PROCEDURE — 99232 SBSQ HOSP IP/OBS MODERATE 35: CPT | Performed by: INTERNAL MEDICINE

## 2019-02-27 PROCEDURE — 36415 COLL VENOUS BLD VENIPUNCTURE: CPT

## 2019-02-27 PROCEDURE — 85025 COMPLETE CBC W/AUTO DIFF WBC: CPT

## 2019-02-27 PROCEDURE — 80048 BASIC METABOLIC PNL TOTAL CA: CPT

## 2019-02-27 PROCEDURE — 6360000002 HC RX W HCPCS: Performed by: NURSE PRACTITIONER

## 2019-02-27 PROCEDURE — 94760 N-INVAS EAR/PLS OXIMETRY 1: CPT

## 2019-02-27 PROCEDURE — 6370000000 HC RX 637 (ALT 250 FOR IP): Performed by: INTERNAL MEDICINE

## 2019-02-27 PROCEDURE — 2580000003 HC RX 258: Performed by: NURSE PRACTITIONER

## 2019-02-27 PROCEDURE — 97165 OT EVAL LOW COMPLEX 30 MIN: CPT | Performed by: OCCUPATIONAL THERAPIST

## 2019-02-27 PROCEDURE — 97161 PT EVAL LOW COMPLEX 20 MIN: CPT

## 2019-02-27 PROCEDURE — 6370000000 HC RX 637 (ALT 250 FOR IP): Performed by: NURSE PRACTITIONER

## 2019-02-27 RX ORDER — INSULIN GLARGINE 100 [IU]/ML
20 INJECTION, SOLUTION SUBCUTANEOUS DAILY
Status: DISCONTINUED | OUTPATIENT
Start: 2019-02-27 | End: 2019-02-27

## 2019-02-27 RX ORDER — NICOTINE 21 MG/24HR
1 PATCH, TRANSDERMAL 24 HOURS TRANSDERMAL DAILY
Qty: 30 PATCH | Refills: 0 | COMMUNITY
Start: 2019-02-28 | End: 2019-03-04

## 2019-02-27 RX ORDER — DEXTROSE MONOHYDRATE 50 MG/ML
100 INJECTION, SOLUTION INTRAVENOUS PRN
Status: DISCONTINUED | OUTPATIENT
Start: 2019-02-27 | End: 2019-02-27

## 2019-02-27 RX ORDER — DEXTROSE MONOHYDRATE 25 G/50ML
12.5 INJECTION, SOLUTION INTRAVENOUS PRN
Status: DISCONTINUED | OUTPATIENT
Start: 2019-02-27 | End: 2019-02-27

## 2019-02-27 RX ORDER — NICOTINE POLACRILEX 4 MG
15 LOZENGE BUCCAL PRN
Status: DISCONTINUED | OUTPATIENT
Start: 2019-02-27 | End: 2019-02-27

## 2019-02-27 RX ORDER — ASPIRIN 325 MG
325 TABLET ORAL DAILY
Qty: 30 TABLET | Refills: 0 | COMMUNITY
Start: 2019-02-27 | End: 2019-08-08 | Stop reason: ALTCHOICE

## 2019-02-27 RX ADMIN — METOPROLOL TARTRATE 25 MG: 25 TABLET ORAL at 09:10

## 2019-02-27 RX ADMIN — ASPIRIN 325 MG ORAL TABLET 325 MG: 325 PILL ORAL at 09:10

## 2019-02-27 RX ADMIN — KETOROLAC TROMETHAMINE 15 MG: 30 INJECTION, SOLUTION INTRAMUSCULAR; INTRAVENOUS at 02:55

## 2019-02-27 RX ADMIN — KETOROLAC TROMETHAMINE 15 MG: 30 INJECTION, SOLUTION INTRAMUSCULAR; INTRAVENOUS at 09:11

## 2019-02-27 RX ADMIN — ACETAMINOPHEN 1000 MG: 10 INJECTION, SOLUTION INTRAVENOUS at 00:43

## 2019-02-27 RX ADMIN — ACETAMINOPHEN 1000 MG: 10 INJECTION, SOLUTION INTRAVENOUS at 06:00

## 2019-02-27 RX ADMIN — Medication 500 MG: at 09:10

## 2019-02-27 RX ADMIN — LISINOPRIL 20 MG: 20 TABLET ORAL at 09:10

## 2019-02-27 RX ADMIN — NAPROXEN 500 MG: 250 TABLET ORAL at 09:10

## 2019-02-27 RX ADMIN — DOCUSATE SODIUM 100 MG: 100 CAPSULE, LIQUID FILLED ORAL at 09:10

## 2019-02-27 RX ADMIN — CEFAZOLIN 1 G: 1 INJECTION, POWDER, FOR SOLUTION INTRAMUSCULAR; INTRAVENOUS at 03:40

## 2019-02-27 RX ADMIN — PANTOPRAZOLE SODIUM 40 MG: 40 TABLET, DELAYED RELEASE ORAL at 06:00

## 2019-02-27 RX ADMIN — LACTULOSE 20 G: 20 SOLUTION ORAL at 09:10

## 2019-02-27 ASSESSMENT — PAIN DESCRIPTION - ORIENTATION
ORIENTATION: LEFT
ORIENTATION: LEFT

## 2019-02-27 ASSESSMENT — PAIN DESCRIPTION - PAIN TYPE
TYPE: SURGICAL PAIN
TYPE: SURGICAL PAIN

## 2019-02-27 ASSESSMENT — PAIN DESCRIPTION - LOCATION
LOCATION: KNEE
LOCATION: KNEE

## 2019-02-27 ASSESSMENT — PAIN SCALES - GENERAL
PAINLEVEL_OUTOF10: 0
PAINLEVEL_OUTOF10: 2
PAINLEVEL_OUTOF10: 0
PAINLEVEL_OUTOF10: 0
PAINLEVEL_OUTOF10: 2
PAINLEVEL_OUTOF10: 0

## 2019-02-28 ENCOUNTER — TELEPHONE (OUTPATIENT)
Dept: INTERNAL MEDICINE | Age: 59
End: 2019-02-28

## 2019-03-04 ENCOUNTER — TELEPHONE (OUTPATIENT)
Dept: INTERNAL MEDICINE | Age: 59
End: 2019-03-04
Payer: COMMERCIAL

## 2019-03-04 DIAGNOSIS — K21.9 GASTROESOPHAGEAL REFLUX DISEASE WITHOUT ESOPHAGITIS: ICD-10-CM

## 2019-03-04 DIAGNOSIS — Z98.890 S/P CARDIAC CATH: ICD-10-CM

## 2019-03-04 DIAGNOSIS — I10 ESSENTIAL HYPERTENSION: ICD-10-CM

## 2019-03-04 DIAGNOSIS — Z96.652 STATUS POST LEFT KNEE REPLACEMENT: Primary | ICD-10-CM

## 2019-03-04 DIAGNOSIS — I25.10 CORONARY ARTERY DISEASE INVOLVING NATIVE CORONARY ARTERY OF NATIVE HEART WITHOUT ANGINA PECTORIS: ICD-10-CM

## 2019-03-05 PROCEDURE — G0180 MD CERTIFICATION HHA PATIENT: HCPCS | Performed by: INTERNAL MEDICINE

## 2019-03-12 ENCOUNTER — OFFICE VISIT (OUTPATIENT)
Dept: ORTHOPEDIC SURGERY | Age: 59
End: 2019-03-12

## 2019-03-12 VITALS
WEIGHT: 223 LBS | SYSTOLIC BLOOD PRESSURE: 130 MMHG | HEIGHT: 70 IN | HEART RATE: 82 BPM | BODY MASS INDEX: 31.92 KG/M2 | DIASTOLIC BLOOD PRESSURE: 72 MMHG

## 2019-03-12 DIAGNOSIS — Z96.651 HISTORY OF TOTAL RIGHT KNEE REPLACEMENT: Primary | ICD-10-CM

## 2019-03-12 PROCEDURE — 99024 POSTOP FOLLOW-UP VISIT: CPT | Performed by: PHYSICIAN ASSISTANT

## 2019-04-09 ENCOUNTER — OFFICE VISIT (OUTPATIENT)
Dept: ORTHOPEDIC SURGERY | Age: 59
End: 2019-04-09

## 2019-04-09 VITALS
HEART RATE: 79 BPM | SYSTOLIC BLOOD PRESSURE: 138 MMHG | HEIGHT: 70 IN | BODY MASS INDEX: 31.92 KG/M2 | WEIGHT: 223 LBS | DIASTOLIC BLOOD PRESSURE: 88 MMHG

## 2019-04-09 DIAGNOSIS — M17.12 PRIMARY OSTEOARTHRITIS OF LEFT KNEE: ICD-10-CM

## 2019-04-09 DIAGNOSIS — Z96.651 HISTORY OF TOTAL RIGHT KNEE REPLACEMENT: Primary | ICD-10-CM

## 2019-04-09 PROCEDURE — 99024 POSTOP FOLLOW-UP VISIT: CPT | Performed by: PHYSICIAN ASSISTANT

## 2019-04-09 RX ORDER — AMOXICILLIN 500 MG/1
CAPSULE ORAL
Refills: 1 | COMMUNITY
Start: 2019-03-27 | End: 2019-04-09

## 2019-04-09 NOTE — LETTER
[address] ORTHOPEDICS  [facility] hospital  Phone: [phone]  Fax: [phone]    Rafiaabbe Pendleton        April 9, 2019     Patient: Gregory Shelley   YOB: 1960   Date of Visit: 4/9/2019       To Whom It May Concern: It is my medical opinion that Kiley Vasques may return to work on 05/08/2019. If you have any questions or concerns, please don't hesitate to call.     Sincerely,        Boy Arevalo PA-C

## 2019-04-10 NOTE — PROGRESS NOTES
[facility]  [facility]  Dept: [phone]  Loc: [phone]    Date of Service:  4/9/2019    Coco Ryan  YOB: 1960  MRN: [id number]      Coco Ryan is a pleasant 66 y.o. male who comes in today status post left total knee arthroplasty that was done on 02/26/2019. He is doing reasonably well at this time. He is getting beyond 90 degrees of flexion. He has full extension of the knee. He does still have some swelling of this left lower extremity. PHYSICAL EXAM:  This is a pleasant 66 y.o. male, alert and oriented x3, cooperative, in no acute distress. Sensation intact. Neurovascularly intact. He has full flexion and extension of the knee. Negative varus and valgus stress testing. Negative Lachman. Negative Irma. X-RAYS:  None obtained today. IMPRESSION:  Stable left total knee arthroplasty done on 02/26/2019. PLAN:  We are going to have the patient followup with us on an as-needed basis. We are going to keep him off work for another month. We are going to have him followup with us again in one more month just to ensure that everything is going well and ensure that this wound is completely healed. Polly Paredes, am personally transcribing for Holger Reagan PA-C 4/10/19 at 3:15 PM.    I, Holger Reagan PA-C, personally performed the services described in this document as transcribed by Bucky Lehman, and it is both accurate and complete.     Electronically signed by Holger Reagan PA-C on 4/12/2019 at 11:03 AM

## 2019-04-23 ENCOUNTER — OFFICE VISIT (OUTPATIENT)
Dept: ORTHOPEDIC SURGERY | Age: 59
End: 2019-04-23

## 2019-04-23 VITALS — BODY MASS INDEX: 32.29 KG/M2 | DIASTOLIC BLOOD PRESSURE: 90 MMHG | SYSTOLIC BLOOD PRESSURE: 138 MMHG | HEIGHT: 70 IN

## 2019-04-23 DIAGNOSIS — Z96.651 HISTORY OF TOTAL RIGHT KNEE REPLACEMENT: Primary | ICD-10-CM

## 2019-04-23 DIAGNOSIS — M17.12 PRIMARY OSTEOARTHRITIS OF LEFT KNEE: ICD-10-CM

## 2019-04-23 PROCEDURE — 99024 POSTOP FOLLOW-UP VISIT: CPT | Performed by: NURSE PRACTITIONER

## 2019-04-23 NOTE — LETTER
[address] ORTHOPEDICS  [facility]  Phone: [phone]  Fax: [phone] [facility], APRN - CNP        April 23, 2019     Patient: Jana Garcia   YOB: 1960   Date of Visit: 4/23/2019       To Whom It May Concern: It is my medical opinion that Obadiah Castleman should remain out of work until 05/21/2019. If you have any questions or concerns, please don't hesitate to call.     Sincerely,        Juancho Ortiz, APRN - CNP

## 2019-04-24 NOTE — PROGRESS NOTES
[facility] [address]  [facility]  Dept: [phone]  Loc: [phone]    Date of Service:  4/23/2019    Danielle Ruano  YOB: 1960  MRN: [id number]    CHIEF COMPLAINT:  The patient is here for postop visit total knee arthroplasty. Danielle Ruano is a 64 y.o. male who comes in today about 2 months out now status post left total knee arthroplasty. He has been off work. 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 He is concerned about going back to work due to instability to the knee and pain at the knee that he experiences at times. Knee range of motion is good. He does have full extension. He is flexing this knee back to about 115 degrees at this time. He did complete his physical therapy sessions but is noting to have some weakness throughout the quads and left leg. He does have pain globally to the knee that will radiate down the lateral side of the lower leg. He has been taking Aleve currently for pain. Surgical wound is well healed. No redness or drainage noted. PHYSICAL EXAM:  General:  This is a well-developed, well-nourished male, alert and oriented x3, calm, cooperative, in no acute distress. On physical exam of the left lower extremity, surgical wound well healed. No active drainage. No increased warmth or cellulitis noted. He does have full extension, flexion to about 115 degrees. Sensation intact. Neurovascularly intact to the left lower extremity. Negative varus and valgus stress testing. Toe flexion and extension is intact. He denies any calf pain to palpation. He has some pain to the knee to palpation over the medial knee. X-RAYS:  No new x-rays obtained today. IMPRESSION:     History of total right knee replacement    -  Primary    Primary osteoarthritis of left knee         PLAN:  At this time, we will keep patient off work for another month.   Get him back into therapy mainly for strengthening of the quads and leg muscles. We will keep him off work another month. See him back in 4 weeks for clinical exam to see if things are improved and if he is ready to get back to work at that time. Continue weightbearing as tolerated. Activities as tolerated. Xu Shields, am personally transcribing for Darryle Hanger, APRN - CNP 4/24/19 at 8:39 AM.    INato APRN - CNP, personally performed the services described in this document as transcribed by Jayme Dawn, and it is both accurate and complete.     Electronically signed by Darryle Hanger, APRN - CNP on 4/25/2019 at 11:36 AM

## 2019-05-14 ENCOUNTER — OFFICE VISIT (OUTPATIENT)
Dept: ORTHOPEDIC SURGERY | Age: 59
End: 2019-05-14
Payer: COMMERCIAL

## 2019-05-14 VITALS
WEIGHT: 223 LBS | BODY MASS INDEX: 33.03 KG/M2 | HEART RATE: 88 BPM | SYSTOLIC BLOOD PRESSURE: 132 MMHG | DIASTOLIC BLOOD PRESSURE: 74 MMHG | HEIGHT: 69 IN

## 2019-05-14 DIAGNOSIS — M25.561 RIGHT KNEE PAIN, UNSPECIFIED CHRONICITY: Primary | ICD-10-CM

## 2019-05-14 DIAGNOSIS — M17.11 PRIMARY OSTEOARTHRITIS OF RIGHT KNEE: ICD-10-CM

## 2019-05-14 PROCEDURE — 20610 DRAIN/INJ JOINT/BURSA W/O US: CPT | Performed by: NURSE PRACTITIONER

## 2019-05-14 PROCEDURE — 99024 POSTOP FOLLOW-UP VISIT: CPT | Performed by: NURSE PRACTITIONER

## 2019-05-14 RX ORDER — BUPIVACAINE HYDROCHLORIDE 5 MG/ML
5 INJECTION, SOLUTION PERINEURAL ONCE
Status: COMPLETED | OUTPATIENT
Start: 2019-05-14 | End: 2019-05-14

## 2019-05-14 RX ORDER — TRIAMCINOLONE ACETONIDE 40 MG/ML
80 INJECTION, SUSPENSION INTRA-ARTICULAR; INTRAMUSCULAR ONCE
Status: COMPLETED | OUTPATIENT
Start: 2019-05-14 | End: 2019-05-14

## 2019-05-14 RX ADMIN — BUPIVACAINE HYDROCHLORIDE 25 MG: 5 INJECTION, SOLUTION PERINEURAL at 11:52

## 2019-05-14 RX ADMIN — TRIAMCINOLONE ACETONIDE 80 MG: 40 INJECTION, SUSPENSION INTRA-ARTICULAR; INTRAMUSCULAR at 11:53

## 2019-05-15 NOTE — PROGRESS NOTES
[facility] [address]  [facility]  Dept: [phone]  Loc: [phone]    Date of Service:  5/14/2019    Hermilo Schaefer  YOB: 1960  MRN: [id number]    CHIEF COMPLAINT:  The patient is here for followup of left total knee. Hermilo Schaefer is a 64 y.o. male who comes in today about 2-1/2 months out now after a left total knee arthroplasty. 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 He has gotten continued therapy, getting flexion to about 120 degrees and full extension to 0 degrees. He states the pain is improving but at times still gets sharp pains to the knee. He also is having pain to the right knee. He has had previous injections for osteoarthritis in the past.  He is requesting an injection here today. PHYSICAL EXAM:  General:  This is a well-developed, well-nourished male, alert and oriented x3, calm, cooperative, in no acute distress. On physical exam of bilateral lower extremities, skin is intact. No rashes, lesions or drainage. No increased warmth or cellulitis. Surgical wound well healed to the left knee. He does have flexion to 120 degrees, full extension of the left leg. Neurovascularly intact. Sensation intact bilaterally. Good stability to varus and valgus stress testing bilaterally. Crepitus is noted to the right knee with flexion and extension. X-RAYS: Reviewed from previous x-rays of the right knee which do show tricompartmental osteoarthritis. IMPRESSION:     Right knee pain, unspecified chronicity    -  Primary    Primary osteoarthritis of right knee         PLAN:  At this time, we will proceed with a corticosteroid injection into the right knee today per patient request.  See patient back as needed for any further issues. Also get him back into work full duty.       PROCEDURE NOTE:  After verbal consent was obtained,   the left knee was prepped in sterile fashion with Betadine and alcohol. A 22-gauge needle was introduced into the left inferolateral portal of the knee and 5 mL of 0.5% Marcaine and 80 mg of Kenalog were injected. Hemostasis achieved. Dry sterile bandage applied. The patient tolerated procedure well. Ciera Gipson am personally transcribing for Butler Holstein, APRN - CNP 5/15/19 at 9:29 AM.    Marisa HOLLAND APRN - CNP, personally performed the services described in this document as transcribed by Hernan Eduardo, and it is both accurate and complete.     Electronically signed by Butler Holstein, APRN - CNP on 5/16/2019 at 8:55 AM

## 2019-06-05 RX ORDER — LISINOPRIL 20 MG/1
TABLET ORAL
Qty: 90 TABLET | Refills: 3 | Status: SHIPPED | OUTPATIENT
Start: 2019-06-05 | End: 2020-06-15

## 2019-06-06 RX ORDER — ATORVASTATIN CALCIUM 40 MG/1
TABLET, FILM COATED ORAL
Qty: 90 TABLET | Refills: 3 | Status: SHIPPED | OUTPATIENT
Start: 2019-06-06 | End: 2020-06-15

## 2019-06-18 ENCOUNTER — OFFICE VISIT (OUTPATIENT)
Dept: ORTHOPEDIC SURGERY | Age: 59
End: 2019-06-18
Payer: COMMERCIAL

## 2019-06-18 VITALS
HEIGHT: 69 IN | BODY MASS INDEX: 33.03 KG/M2 | DIASTOLIC BLOOD PRESSURE: 89 MMHG | HEART RATE: 87 BPM | SYSTOLIC BLOOD PRESSURE: 139 MMHG | WEIGHT: 223 LBS

## 2019-06-18 DIAGNOSIS — M25.462 SWELLING OF JOINT OF LEFT KNEE: ICD-10-CM

## 2019-06-18 DIAGNOSIS — Z96.651 HISTORY OF TOTAL RIGHT KNEE REPLACEMENT: Primary | ICD-10-CM

## 2019-06-18 DIAGNOSIS — M25.561 RIGHT KNEE PAIN, UNSPECIFIED CHRONICITY: ICD-10-CM

## 2019-06-18 PROCEDURE — 99212 OFFICE O/P EST SF 10 MIN: CPT | Performed by: PHYSICIAN ASSISTANT

## 2019-06-19 NOTE — PROGRESS NOTES
[facility]  [address]  ORTHOPEDICS  [facility]  Dept: [phone]  Loc: [phone]    Date of Service:  6/18/2019    Jana Garcia  YOB: 1960  MRN: [id number]      Jana Garcia is a 64 y.o. male who comes in today with continued right knee pain. The patient underwent a total knee arthroplasty which he stated he was doing really well with for the first month or so and then began having increasing pain. At this time he has increasing pain and swelling about this knee most noted along the lateral portion as well as superiorly. Neurovascularly intact. Sensation intact. He does have pain with any type of flexion and extension of this knee. He has difficulty getting up from a seated position. It is worse going up the steps as well. PHYSICAL EXAM:  This is a 64 y.o. male, alert and oriented x3, cooperative, in no acute distress. He has tenderness to palpation along the medial and lateral plateau right along the tibial component. X-RAYS:  None obtained at this time. IMPRESSION:     History of total right knee replacement    -  Primary    Right knee pain, unspecified chronicity        Swelling of joint of left knee         PLAN:  I feel that this patient would benefit from undergoing a bone scan, indium scan for further evaluation for concern for loosening about this prosthesis. Once this is completed we will discuss further treatment options. Genna Valenzuela am personally transcribing for Prince Avalos PA-C 6/19/19 at 2:12 PM.    I, Prince Avalos PA-C, personally performed the services described in this document as transcribed by Yarely Rae, and it is both accurate and complete.     Electronically signed by Prince Avalos PA-C on 6/20/2019 at 12:26 PM

## 2019-06-25 DIAGNOSIS — Z96.652 HISTORY OF TOTAL KNEE ARTHROPLASTY, LEFT: Primary | ICD-10-CM

## 2019-06-26 ENCOUNTER — HOSPITAL ENCOUNTER (OUTPATIENT)
Dept: NUCLEAR MEDICINE | Age: 59
Discharge: HOME | End: 2019-06-26
Payer: COMMERCIAL

## 2019-06-26 DIAGNOSIS — Z96.651 HISTORY OF TOTAL RIGHT KNEE REPLACEMENT: ICD-10-CM

## 2019-06-26 DIAGNOSIS — M25.561 RIGHT KNEE PAIN, UNSPECIFIED CHRONICITY: ICD-10-CM

## 2019-06-26 DIAGNOSIS — M25.462 SWELLING OF JOINT OF LEFT KNEE: ICD-10-CM

## 2019-06-26 DIAGNOSIS — Z96.652 HISTORY OF TOTAL KNEE ARTHROPLASTY, LEFT: ICD-10-CM

## 2019-06-26 PROCEDURE — A9570 INDIUM IN-111 AUTO WBC: HCPCS | Performed by: PHYSICIAN ASSISTANT

## 2019-06-26 PROCEDURE — 78102 BONE MARROW IMAGING LTD: CPT

## 2019-06-26 PROCEDURE — 3430000000 HC RX DIAGNOSTIC RADIOPHARMACEUTICAL: Performed by: PHYSICIAN ASSISTANT

## 2019-06-26 PROCEDURE — 2709999900 HC NON-CHARGEABLE SUPPLY

## 2019-06-26 RX ADMIN — Medication 546 MICRO CURIE: at 10:30

## 2019-06-27 ENCOUNTER — HOSPITAL ENCOUNTER (OUTPATIENT)
Dept: NUCLEAR MEDICINE | Age: 59
Discharge: HOME | End: 2019-06-27
Payer: COMMERCIAL

## 2019-06-27 PROCEDURE — 3430000000 HC RX DIAGNOSTIC RADIOPHARMACEUTICAL: Performed by: PHYSICIAN ASSISTANT

## 2019-06-27 PROCEDURE — 2709999900 HC NON-CHARGEABLE SUPPLY

## 2019-06-27 PROCEDURE — A9541 TC99M SULFUR COLLOID: HCPCS | Performed by: PHYSICIAN ASSISTANT

## 2019-06-27 RX ADMIN — Medication 16.5 MILLICURIE: at 10:44

## 2019-06-28 ENCOUNTER — TELEPHONE (OUTPATIENT)
Dept: ORTHOPEDIC SURGERY | Age: 59
End: 2019-06-28

## 2019-06-28 NOTE — TELEPHONE ENCOUNTER
Patient aware report says no signs of infection, but they will review on Tuesday and we will call with any recommendations.

## 2019-07-02 ENCOUNTER — TELEPHONE (OUTPATIENT)
Dept: ORTHOPEDIC SURGERY | Age: 59
End: 2019-07-02

## 2019-07-02 NOTE — TELEPHONE ENCOUNTER
Dr Luiz Dunham reviewed bone marrow scan results of left knee  No signs of prosthesis infection noted  Dr Luiz Dunham said if patient still having pain, he would recommend trying a steroid injection  Spoke with patients wife, she will talk with Susie Adams and let us know

## 2019-08-08 ENCOUNTER — HOSPITAL ENCOUNTER (OUTPATIENT)
Dept: CT IMAGING | Age: 59
Discharge: HOME | End: 2019-08-10
Payer: COMMERCIAL

## 2019-08-08 ENCOUNTER — OFFICE VISIT (OUTPATIENT)
Dept: INTERNAL MEDICINE | Age: 59
End: 2019-08-08
Payer: COMMERCIAL

## 2019-08-08 ENCOUNTER — OFFICE VISIT (OUTPATIENT)
Dept: CARDIOLOGY | Age: 59
End: 2019-08-08
Payer: COMMERCIAL

## 2019-08-08 ENCOUNTER — HOSPITAL ENCOUNTER (OUTPATIENT)
Dept: GENERAL RADIOLOGY | Age: 59
Discharge: HOME | End: 2019-08-10
Payer: COMMERCIAL

## 2019-08-08 VITALS
BODY MASS INDEX: 32.82 KG/M2 | HEIGHT: 69 IN | WEIGHT: 221.6 LBS | HEART RATE: 75 BPM | SYSTOLIC BLOOD PRESSURE: 142 MMHG | DIASTOLIC BLOOD PRESSURE: 78 MMHG

## 2019-08-08 VITALS
RESPIRATION RATE: 16 BRPM | HEART RATE: 76 BPM | BODY MASS INDEX: 32.07 KG/M2 | HEIGHT: 70 IN | SYSTOLIC BLOOD PRESSURE: 118 MMHG | DIASTOLIC BLOOD PRESSURE: 72 MMHG | WEIGHT: 224 LBS

## 2019-08-08 DIAGNOSIS — M25.551 RIGHT HIP PAIN: ICD-10-CM

## 2019-08-08 DIAGNOSIS — G89.29 CHRONIC BILATERAL LOW BACK PAIN WITH RIGHT-SIDED SCIATICA: ICD-10-CM

## 2019-08-08 DIAGNOSIS — G89.29 CHRONIC PAIN OF RIGHT KNEE: Primary | ICD-10-CM

## 2019-08-08 DIAGNOSIS — I25.10 CORONARY ARTERY DISEASE INVOLVING NATIVE CORONARY ARTERY OF NATIVE HEART WITHOUT ANGINA PECTORIS: ICD-10-CM

## 2019-08-08 DIAGNOSIS — M54.41 CHRONIC BILATERAL LOW BACK PAIN WITH RIGHT-SIDED SCIATICA: ICD-10-CM

## 2019-08-08 DIAGNOSIS — R20.0 RIGHT ARM NUMBNESS: ICD-10-CM

## 2019-08-08 DIAGNOSIS — I10 ESSENTIAL HYPERTENSION: ICD-10-CM

## 2019-08-08 DIAGNOSIS — I10 ESSENTIAL HYPERTENSION: Primary | ICD-10-CM

## 2019-08-08 DIAGNOSIS — M25.561 CHRONIC PAIN OF RIGHT KNEE: Primary | ICD-10-CM

## 2019-08-08 DIAGNOSIS — R73.01 IFG (IMPAIRED FASTING GLUCOSE): ICD-10-CM

## 2019-08-08 PROCEDURE — 99214 OFFICE O/P EST MOD 30 MIN: CPT | Performed by: INTERNAL MEDICINE

## 2019-08-08 PROCEDURE — 72100 X-RAY EXAM L-S SPINE 2/3 VWS: CPT

## 2019-08-08 PROCEDURE — 93000 ELECTROCARDIOGRAM COMPLETE: CPT | Performed by: INTERNAL MEDICINE

## 2019-08-08 PROCEDURE — 73502 X-RAY EXAM HIP UNI 2-3 VIEWS: CPT

## 2019-08-08 PROCEDURE — 72125 CT NECK SPINE W/O DYE: CPT

## 2019-08-08 RX ORDER — METHYLPREDNISOLONE 4 MG/1
TABLET ORAL
Qty: 1 KIT | Refills: 0 | Status: SHIPPED | OUTPATIENT
Start: 2019-08-08 | End: 2019-08-14

## 2019-08-08 RX ORDER — CALCIUM CARBONATE 500(1250)
500 TABLET ORAL DAILY
COMMUNITY
End: 2019-12-02 | Stop reason: SDUPTHER

## 2019-08-08 RX ORDER — IBUPROFEN 200 MG
200 TABLET ORAL EVERY 6 HOURS PRN
COMMUNITY
End: 2022-03-18

## 2019-08-08 ASSESSMENT — PATIENT HEALTH QUESTIONNAIRE - PHQ9
SUM OF ALL RESPONSES TO PHQ9 QUESTIONS 1 & 2: 0
SUM OF ALL RESPONSES TO PHQ QUESTIONS 1-9: 0
1. LITTLE INTEREST OR PLEASURE IN DOING THINGS: 0
SUM OF ALL RESPONSES TO PHQ QUESTIONS 1-9: 0
2. FEELING DOWN, DEPRESSED OR HOPELESS: 0

## 2019-08-08 ASSESSMENT — ENCOUNTER SYMPTOMS
BACK PAIN: 0
DIARRHEA: 0
ABDOMINAL PAIN: 0
CONSTIPATION: 0
VOMITING: 0
EYE PAIN: 0
COUGH: 0
NAUSEA: 0
BLOOD IN STOOL: 0
SHORTNESS OF BREATH: 0

## 2019-08-08 NOTE — PROGRESS NOTES
Negative for adenopathy. Does not bruise/bleed easily. Psychiatric/Behavioral: Negative for confusion. The patient is not nervous/anxious. Objective:     Physical Exam   Constitutional: He is oriented to person, place, and time. He appears well-developed and well-nourished. HENT:   Head: Normocephalic and atraumatic. Eyes: Pupils are equal, round, and reactive to light. EOM are normal.   Neck: Neck supple. Cardiovascular: Normal rate and regular rhythm. Exam reveals no gallop and no friction rub. No murmur heard. Pulmonary/Chest: Effort normal and breath sounds normal. He has no wheezes. He has no rales. Abdominal: Soft. He exhibits no distension and no mass. There is no tenderness. There is no rebound. Musculoskeletal: Normal range of motion. He exhibits no edema. Lymphadenopathy:     He has no cervical adenopathy. Neurological: He is alert and oriented to person, place, and time. No cranial nerve deficit (grossly). Skin: Skin is warm and dry. No rash noted. Psychiatric: He has a normal mood and affect. Thought content normal.   Vitals reviewed. /72 (Site: Left Upper Arm, Position: Sitting, Cuff Size: Medium Adult)   Pulse 76   Resp 16   Ht 5' 10\" (1.778 m)   Wt 224 lb (101.6 kg)   BMI 32.14 kg/m²     Assessment:       Diagnosis Orders   1. Chronic pain of right knee  methylPREDNISolone (MEDROL DOSEPACK) 4 MG tablet   2. Essential hypertension     3. Coronary artery disease involving native coronary artery of native heart without angina pectoris     4. IFG (impaired fasting glucose)  Hemoglobin A1C   5. Right arm numbness  CT CERVICAL SPINE WO CONTRAST   6. Right hip pain  XR HIP RIGHT (2-3 VIEWS)   7. Chronic bilateral low back pain with right-sided sciatica  XR LUMBAR SPINE (2-3 VIEWS)             Plan:       Return if symptoms worsen or fail to improve, for Hypertension, knee pain, CAD.     Orders Placed This Encounter   Procedures    CT CERVICAL SPINE WO CONTRAST Standing Status:   Future     Standing Expiration Date:   8/8/2020    XR HIP RIGHT (2-3 VIEWS)     Standing Status:   Future     Standing Expiration Date:   8/8/2020    XR LUMBAR SPINE (2-3 VIEWS)     Standing Status:   Future     Standing Expiration Date:   8/8/2020    Hemoglobin A1C     Standing Status:   Future     Standing Expiration Date:   8/8/2020     Orders Placed This Encounter   Medications    methylPREDNISolone (MEDROL DOSEPACK) 4 MG tablet     Sig: Take by mouth. Dispense:  1 kit     Refill:  0       Patientgiven educational materials - see patient instructions. Discussed use, benefit,and side effects of prescribed medications. All patient questions answered. Ptvoiced understanding. Reviewed health maintenance. Instructed to continue currentmedications, diet and exercise. Patient agreed with treatment plan. Follow up asdirected.      Electronically signed by Bibi Galloway MD on 8/8/2019 at 10:56 AM

## 2019-08-13 ENCOUNTER — OFFICE VISIT (OUTPATIENT)
Dept: ORTHOPEDIC SURGERY | Age: 59
End: 2019-08-13
Payer: COMMERCIAL

## 2019-08-13 VITALS
SYSTOLIC BLOOD PRESSURE: 154 MMHG | DIASTOLIC BLOOD PRESSURE: 90 MMHG | HEIGHT: 69 IN | WEIGHT: 223 LBS | HEART RATE: 75 BPM | BODY MASS INDEX: 33.03 KG/M2

## 2019-08-13 DIAGNOSIS — M25.561 CHRONIC PAIN OF RIGHT KNEE: Primary | ICD-10-CM

## 2019-08-13 DIAGNOSIS — M54.16 RADICULOPATHY, LUMBAR REGION: ICD-10-CM

## 2019-08-13 DIAGNOSIS — G89.29 CHRONIC PAIN OF RIGHT KNEE: Primary | ICD-10-CM

## 2019-08-13 DIAGNOSIS — M54.2 CERVICAL PAIN: ICD-10-CM

## 2019-08-13 DIAGNOSIS — M54.12 RADICULOPATHY OF CERVICAL SPINE: ICD-10-CM

## 2019-08-13 PROCEDURE — 99213 OFFICE O/P EST LOW 20 MIN: CPT | Performed by: PHYSICIAN ASSISTANT

## 2019-08-13 PROCEDURE — 20610 DRAIN/INJ JOINT/BURSA W/O US: CPT | Performed by: PHYSICIAN ASSISTANT

## 2019-08-14 RX ORDER — TRIAMCINOLONE ACETONIDE 40 MG/ML
80 INJECTION, SUSPENSION INTRA-ARTICULAR; INTRAMUSCULAR ONCE
Status: COMPLETED | OUTPATIENT
Start: 2019-08-14 | End: 2019-08-19

## 2019-08-14 RX ORDER — BUPIVACAINE HYDROCHLORIDE 5 MG/ML
5 INJECTION, SOLUTION PERINEURAL ONCE
Status: COMPLETED | OUTPATIENT
Start: 2019-08-14 | End: 2019-08-19

## 2019-08-19 PROCEDURE — S0020 INJECTION, BUPIVICAINE HYDRO: HCPCS | Performed by: PHYSICIAN ASSISTANT

## 2019-08-19 RX ADMIN — TRIAMCINOLONE ACETONIDE 80 MG: 40 INJECTION, SUSPENSION INTRA-ARTICULAR; INTRAMUSCULAR at 13:44

## 2019-08-19 RX ADMIN — BUPIVACAINE HYDROCHLORIDE 25 MG: 5 INJECTION, SOLUTION PERINEURAL at 13:43

## 2019-08-28 ENCOUNTER — HOSPITAL ENCOUNTER (OUTPATIENT)
Dept: MRI IMAGING | Age: 59
Discharge: HOME | End: 2019-08-30
Payer: COMMERCIAL

## 2019-08-28 DIAGNOSIS — M54.16 RADICULOPATHY, LUMBAR REGION: ICD-10-CM

## 2019-08-28 DIAGNOSIS — M54.2 CERVICAL PAIN: ICD-10-CM

## 2019-08-28 DIAGNOSIS — M54.12 RADICULOPATHY OF CERVICAL SPINE: ICD-10-CM

## 2019-08-28 PROCEDURE — 72148 MRI LUMBAR SPINE W/O DYE: CPT

## 2019-08-28 PROCEDURE — 72141 MRI NECK SPINE W/O DYE: CPT

## 2019-09-03 ENCOUNTER — TELEPHONE (OUTPATIENT)
Dept: ORTHOPEDIC SURGERY | Age: 59
End: 2019-09-03

## 2019-10-03 ENCOUNTER — HOSPITAL ENCOUNTER (OUTPATIENT)
Dept: INTERVENTIONAL RADIOLOGY/VASCULAR | Age: 59
Discharge: HOME | End: 2019-10-03
Payer: COMMERCIAL

## 2019-10-03 VITALS
BODY MASS INDEX: 33.02 KG/M2 | HEART RATE: 68 BPM | OXYGEN SATURATION: 93 % | TEMPERATURE: 97.2 F | SYSTOLIC BLOOD PRESSURE: 130 MMHG | DIASTOLIC BLOOD PRESSURE: 86 MMHG | WEIGHT: 223.6 LBS | RESPIRATION RATE: 16 BRPM

## 2019-10-03 PROCEDURE — 6360000002 HC RX W HCPCS

## 2019-10-03 PROCEDURE — 62323 NJX INTERLAMINAR LMBR/SAC: CPT | Performed by: RADIOLOGY

## 2019-10-03 PROCEDURE — 2709999900 HC NON-CHARGEABLE SUPPLY

## 2019-10-03 PROCEDURE — 2500000003 HC RX 250 WO HCPCS

## 2019-10-03 PROCEDURE — 6360000004 HC RX CONTRAST MEDICATION: Performed by: RADIOLOGY

## 2019-10-03 RX ORDER — TRAMADOL HYDROCHLORIDE 50 MG/1
50 TABLET ORAL EVERY 6 HOURS PRN
COMMUNITY
End: 2019-12-02 | Stop reason: ALTCHOICE

## 2019-10-03 RX ORDER — BUPIVACAINE HYDROCHLORIDE 2.5 MG/ML
5 INJECTION, SOLUTION EPIDURAL; INFILTRATION; INTRACAUDAL ONCE
Status: COMPLETED | OUTPATIENT
Start: 2019-10-03 | End: 2019-10-03

## 2019-10-03 RX ORDER — METHYLPREDNISOLONE ACETATE 80 MG/ML
80 INJECTION, SUSPENSION INTRA-ARTICULAR; INTRALESIONAL; INTRAMUSCULAR; SOFT TISSUE ONCE
Status: COMPLETED | OUTPATIENT
Start: 2019-10-03 | End: 2019-10-03

## 2019-10-03 RX ADMIN — IOHEXOL 1 ML: 180 INJECTION INTRAVENOUS at 12:03

## 2019-10-03 RX ADMIN — BUPIVACAINE HYDROCHLORIDE 2 ML: 2.5 INJECTION, SOLUTION EPIDURAL; INFILTRATION; INTRACAUDAL at 12:03

## 2019-10-03 RX ADMIN — METHYLPREDNISOLONE ACETATE 80 MG: 80 INJECTION, SUSPENSION INTRA-ARTICULAR; INTRALESIONAL; INTRAMUSCULAR; SOFT TISSUE at 12:03

## 2019-10-03 ASSESSMENT — PAIN SCALES - GENERAL
PAINLEVEL_OUTOF10: 2
PAINLEVEL_OUTOF10: 0
PAINLEVEL_OUTOF10: 0

## 2019-10-03 ASSESSMENT — PAIN - FUNCTIONAL ASSESSMENT: PAIN_FUNCTIONAL_ASSESSMENT: 0-10

## 2019-10-03 ASSESSMENT — PAIN DESCRIPTION - DESCRIPTORS: DESCRIPTORS: NUMBNESS

## 2019-12-02 ENCOUNTER — OFFICE VISIT (OUTPATIENT)
Dept: INTERNAL MEDICINE | Age: 59
End: 2019-12-02
Payer: COMMERCIAL

## 2019-12-02 VITALS
SYSTOLIC BLOOD PRESSURE: 132 MMHG | HEART RATE: 92 BPM | DIASTOLIC BLOOD PRESSURE: 78 MMHG | RESPIRATION RATE: 16 BRPM | HEIGHT: 71 IN | WEIGHT: 227 LBS | BODY MASS INDEX: 31.78 KG/M2

## 2019-12-02 DIAGNOSIS — Z13.220 SCREENING CHOLESTEROL LEVEL: ICD-10-CM

## 2019-12-02 DIAGNOSIS — I25.10 CORONARY ARTERY DISEASE INVOLVING NATIVE CORONARY ARTERY OF NATIVE HEART WITHOUT ANGINA PECTORIS: ICD-10-CM

## 2019-12-02 DIAGNOSIS — I10 ESSENTIAL HYPERTENSION: ICD-10-CM

## 2019-12-02 DIAGNOSIS — R73.01 IFG (IMPAIRED FASTING GLUCOSE): ICD-10-CM

## 2019-12-02 DIAGNOSIS — Z12.5 ENCOUNTER FOR SCREENING FOR MALIGNANT NEOPLASM OF PROSTATE: ICD-10-CM

## 2019-12-02 DIAGNOSIS — Z00.00 GENERAL MEDICAL EXAM: Primary | ICD-10-CM

## 2019-12-02 DIAGNOSIS — N52.9 ERECTILE DYSFUNCTION, UNSPECIFIED ERECTILE DYSFUNCTION TYPE: ICD-10-CM

## 2019-12-02 PROCEDURE — 99396 PREV VISIT EST AGE 40-64: CPT | Performed by: INTERNAL MEDICINE

## 2019-12-02 RX ORDER — SILDENAFIL CITRATE 20 MG/1
20 TABLET ORAL DAILY PRN
Qty: 5 TABLET | Refills: 3 | Status: SHIPPED | OUTPATIENT
Start: 2019-12-02 | End: 2022-02-07

## 2019-12-02 ASSESSMENT — ENCOUNTER SYMPTOMS
VOMITING: 0
DIARRHEA: 0
ABDOMINAL PAIN: 0
SHORTNESS OF BREATH: 0
COUGH: 0
BLOOD IN STOOL: 0
CONSTIPATION: 0
BACK PAIN: 0
EYE PAIN: 0
NAUSEA: 0

## 2019-12-02 ASSESSMENT — PATIENT HEALTH QUESTIONNAIRE - PHQ9
SUM OF ALL RESPONSES TO PHQ9 QUESTIONS 1 & 2: 0
2. FEELING DOWN, DEPRESSED OR HOPELESS: 0
SUM OF ALL RESPONSES TO PHQ QUESTIONS 1-9: 0
SUM OF ALL RESPONSES TO PHQ QUESTIONS 1-9: 0
1. LITTLE INTEREST OR PLEASURE IN DOING THINGS: 0

## 2019-12-05 ENCOUNTER — HOSPITAL ENCOUNTER (OUTPATIENT)
Dept: LAB | Age: 59
Discharge: HOME | End: 2019-12-05
Payer: COMMERCIAL

## 2019-12-05 DIAGNOSIS — Z00.00 GENERAL MEDICAL EXAM: ICD-10-CM

## 2019-12-05 DIAGNOSIS — Z13.220 SCREENING CHOLESTEROL LEVEL: ICD-10-CM

## 2019-12-05 DIAGNOSIS — I10 ESSENTIAL HYPERTENSION: ICD-10-CM

## 2019-12-05 DIAGNOSIS — Z12.5 ENCOUNTER FOR SCREENING FOR MALIGNANT NEOPLASM OF PROSTATE: ICD-10-CM

## 2019-12-05 DIAGNOSIS — R73.01 IFG (IMPAIRED FASTING GLUCOSE): ICD-10-CM

## 2019-12-05 LAB
ALBUMIN SERPL-MCNC: 4.4 G/DL (ref 3.5–5.2)
ALBUMIN/GLOBULIN RATIO: 1.6 (ref 1–2.5)
ALP BLD-CCNC: 83 U/L (ref 40–129)
ALT SERPL-CCNC: 19 U/L (ref 5–41)
ANION GAP SERPL CALCULATED.3IONS-SCNC: 14 MMOL/L (ref 9–17)
AST SERPL-CCNC: 21 U/L
BILIRUB SERPL-MCNC: 0.79 MG/DL (ref 0.3–1.2)
BUN BLDV-MCNC: 11 MG/DL (ref 6–20)
BUN/CREAT BLD: 14 (ref 9–20)
CALCIUM SERPL-MCNC: 9.9 MG/DL (ref 8.6–10.4)
CHLORIDE BLD-SCNC: 96 MMOL/L (ref 98–107)
CHOLESTEROL/HDL RATIO: 2
CHOLESTEROL: 138 MG/DL
CO2: 25 MMOL/L (ref 20–31)
CREAT SERPL-MCNC: 0.78 MG/DL (ref 0.7–1.2)
ESTIMATED AVERAGE GLUCOSE: 120 MG/DL
GFR AFRICAN AMERICAN: >60 ML/MIN
GFR NON-AFRICAN AMERICAN: >60 ML/MIN
GFR SERPL CREATININE-BSD FRML MDRD: ABNORMAL ML/MIN/{1.73_M2}
GFR SERPL CREATININE-BSD FRML MDRD: ABNORMAL ML/MIN/{1.73_M2}
GLUCOSE BLD-MCNC: 86 MG/DL (ref 70–99)
HBA1C MFR BLD: 5.8 % (ref 4.8–5.9)
HDLC SERPL-MCNC: 70 MG/DL
LDL CHOLESTEROL: 56 MG/DL (ref 0–130)
POTASSIUM SERPL-SCNC: 4.6 MMOL/L (ref 3.7–5.3)
PROSTATE SPECIFIC ANTIGEN: 2.18 UG/L
SODIUM BLD-SCNC: 135 MMOL/L (ref 135–144)
TOTAL PROTEIN: 7.2 G/DL (ref 6.4–8.3)
TRIGL SERPL-MCNC: 58 MG/DL
VLDLC SERPL CALC-MCNC: NORMAL MG/DL (ref 1–30)

## 2019-12-05 PROCEDURE — 80053 COMPREHEN METABOLIC PANEL: CPT

## 2019-12-05 PROCEDURE — 36415 COLL VENOUS BLD VENIPUNCTURE: CPT

## 2019-12-05 PROCEDURE — G0103 PSA SCREENING: HCPCS

## 2019-12-05 PROCEDURE — 80061 LIPID PANEL: CPT

## 2019-12-05 PROCEDURE — 83036 HEMOGLOBIN GLYCOSYLATED A1C: CPT

## 2020-02-06 ENCOUNTER — OFFICE VISIT (OUTPATIENT)
Dept: CARDIOLOGY | Age: 60
End: 2020-02-06
Payer: COMMERCIAL

## 2020-02-06 VITALS
SYSTOLIC BLOOD PRESSURE: 124 MMHG | BODY MASS INDEX: 31.78 KG/M2 | HEART RATE: 76 BPM | HEIGHT: 71 IN | WEIGHT: 227 LBS | DIASTOLIC BLOOD PRESSURE: 76 MMHG

## 2020-02-06 PROCEDURE — 99214 OFFICE O/P EST MOD 30 MIN: CPT | Performed by: INTERNAL MEDICINE

## 2020-02-06 PROCEDURE — 93000 ELECTROCARDIOGRAM COMPLETE: CPT | Performed by: INTERNAL MEDICINE

## 2020-02-06 NOTE — PROGRESS NOTES
ALT, LABALBU in the last 72 hours. Assessment:    Abn stress test in territories of  RCA (known )  CAD: S/P PCI to D1 and LCX with Chronic RCA occlusion with left to right collaterals, Preserved LV systolic function. HTN  HLP: on statin, LDL 56 12/2019  Chronic smoking  OA   Obesity  Patient Active Problem List   Diagnosis    Hearing loss    GERD (gastroesophageal reflux disease)    Right knee pain    HTN (hypertension)    Arm pain    S/P cardiac cath    Coronary artery disease involving native coronary artery of native heart without angina pectoris    Anxiety    Primary osteoarthritis of left knee    Osteoarthritis of left knee    Status post left knee replacement       Recommendations:  Smoking cessation, pharmacological and nonpharmacological discussed extensively with patient.   REGULAR EXERCISE  CALORIE RESTRICTION  WEIGHT LOSS  CONTINUE CURRENT TREATMENT    [address] Cardiology Consult           [phone]

## 2020-06-15 RX ORDER — ATORVASTATIN CALCIUM 40 MG/1
TABLET, FILM COATED ORAL
Qty: 90 TABLET | Refills: 0 | Status: SHIPPED | OUTPATIENT
Start: 2020-06-15 | End: 2020-09-17

## 2020-06-15 RX ORDER — LISINOPRIL 20 MG/1
TABLET ORAL
Qty: 90 TABLET | Refills: 3 | Status: SHIPPED | OUTPATIENT
Start: 2020-06-15 | End: 2021-06-24

## 2020-08-06 ENCOUNTER — OFFICE VISIT (OUTPATIENT)
Dept: CARDIOLOGY | Age: 60
End: 2020-08-06
Payer: COMMERCIAL

## 2020-08-06 VITALS
HEIGHT: 70 IN | WEIGHT: 236 LBS | DIASTOLIC BLOOD PRESSURE: 80 MMHG | HEART RATE: 71 BPM | BODY MASS INDEX: 33.79 KG/M2 | SYSTOLIC BLOOD PRESSURE: 150 MMHG

## 2020-08-06 PROCEDURE — 93000 ELECTROCARDIOGRAM COMPLETE: CPT | Performed by: INTERNAL MEDICINE

## 2020-08-06 PROCEDURE — 99214 OFFICE O/P EST MOD 30 MIN: CPT | Performed by: INTERNAL MEDICINE

## 2020-08-06 NOTE — PROGRESS NOTES
Today's Date: 8/6/2020  Patient Name: Aniya Conn  Patient's age: 64 y.o., 1960          CC: the patient is a 64 y.o.  male is in the office for SOB. Patient has been doing well cardiac wise, no new cardiac complaints. He denies angina, PND/Orthopnea. He had some GEORGES and B LE edema. Past Medical History:   has a past medical history of Bunion, CAD (coronary artery disease), Diverticul disease small and large intestine, no perforati or abscess, GERD (gastroesophageal reflux disease), Hammer toe, Heart attack ([facility] [address] Hyperlipidemia, Hypertension, Pericarditis, Positive cardiac stress test, and Smoking history. Past Surgical History:   has a past surgical history that includes Upper gastrointestinal endoscopy; Knee arthroscopy (7-2006, 1-2014); Foot surgery; Cardiac catheterization; Colonoscopy (01/08/2018); and Total knee arthroplasty (Left, 2/26/2019). Home Medications:    Prior to Admission medications    Medication Sig Start Date End Date Taking?  Authorizing Provider   atorvastatin (LIPITOR) 40 MG tablet Take 1 tablet by mouth nightly 6/15/20  Yes Robert Thomas, DO   lisinopril (PRINIVIL;ZESTRIL) 20 MG tablet Take 1 tablet by mouth once daily 6/15/20  Yes Francine Reyes MD   metoprolol tartrate (LOPRESSOR) 25 MG tablet Take 1 tablet by mouth twice daily 3/10/20  Yes Rajeev Thomas DO   sildenafil (REVATIO) 20 MG tablet Take 1 tablet by mouth daily as needed (prn) 12/2/19  Yes Francine Reyes MD   ibuprofen (ADVIL;MOTRIN) 200 MG tablet Take 200 mg by mouth every 6 hours as needed for Pain   Yes Historical Provider, MD   aspirin 81 MG tablet Take 81 mg by mouth daily   Yes Historical Provider, MD   Handicap Placard MISC by Does not apply route Valid for 90 days     s/p left total knee arthroplasty 3/4/19  Yes Bert Sainz MD   esomeprazole (NEXIUM) 20 MG delayed release capsule Take 20 mg by mouth daily   Yes Historical Provider, MD   calcium carbonate 600 MG TABS tablet Take 1 tablet by mouth daily   Yes Historical Provider, MD       Allergies:  Ciprofloxacin    Social History:   reports that he has been smoking cigarettes. He has a 15.00 pack-year smoking history. He has never used smokeless tobacco. He reports current alcohol use of about 12.0 standard drinks of alcohol per week. He reports that he does not use drugs. Family History:    Family History   Problem Relation Age of Onset    Diabetes Mother     Early Death Brother 16        MVA    Early Death Sister 46        blood clot    Heart Disease Other        REVIEW OF SYSTEMS:  CONSTITUTIONAL:NEGATIVE  HEENT:NEG  Cardiovascular: No chest pain, Yes dyspnea on exertion, No palpitations. Lower extremity edema: Yes  RESPIRATORY: GEORGES  GASTROINTESTINAL:  negative  GENITOURINARY:  negative  INTEGUMENT:  negative  MUSCULOSKELETAL:  positive for  pain  NEUROLOGICAL:  negative    PHYSICAL EXAM:      BP (!) 150/80   Pulse 71   Ht 5' 10\" (1.778 m)   Wt 236 lb (107 kg)   BMI 33.86 kg/m²    HEENT: PERRL, no cervical lymphadenopathy. No masses palpable. Cardiovascular:  · The apical impulse is not displaced  · Heart  Sounds:RRR, S4  · Jugular venous pulsation Normal  · The carotid upstroke is normal  · Peripheral pulses are symmetrical and full  Respiratory: Good respiratory effort. On auscultation: clear to auscultation bilaterally  Abdomen:  · No masses or tenderness  · Bowel sounds present  Extremities:  ·  No Cyanosis or Clubbing  ·  Lower extremity edema: Yes  Skin: Warm and dry    Cardiac data:    EKG: Sinus  Rhythm  -First degree A-V block   Cornelia = 230  BORDERLINE RHYTHM        Labs:     CBC: No results for input(s): WBC, HGB, HCT, PLT in the last 72 hours. BMP: No results for input(s): NA, K, CO2, BUN, CREATININE, LABGLOM, GLUCOSE in the last 72 hours. PT/INR: No results for input(s): PROTIME, INR in the last 72 hours.   FASTING LIPID PANEL:  Lab Results   Component Value Date    HDL 70 12/05/2019    TRIG 58 12/05/2019     LIVER PROFILE:No results for input(s): AST, ALT, LABALBU in the last 72 hours. Assessment:    GEORGES  CAD: S/P PCI to D1 and LCX with Chronic RCA occlusion with left to right collaterals. Preserved LV systolic function. HTN  HLP: on statin  Chronic smoking  OA   Obesity  Patient Active Problem List   Diagnosis    Hearing loss    GERD (gastroesophageal reflux disease)    Right knee pain    HTN (hypertension)    Arm pain    S/P cardiac cath    Coronary artery disease involving native coronary artery of native heart without angina pectoris    Anxiety    Primary osteoarthritis of left knee    Osteoarthritis of left knee    Status post left knee replacement       Recommendations:  ECHO  FLP  SUPPORT STOCKING  Smoking cessation, pharmacological and nonpharmacological discussed extensively with patient.   CALORIE RESTRICTION  WEIGHT LOSS  [address], [address] Cardiology Consult           [phone]

## 2020-08-10 ENCOUNTER — HOSPITAL ENCOUNTER (OUTPATIENT)
Dept: LAB | Age: 60
Discharge: HOME | End: 2020-08-10
Payer: COMMERCIAL

## 2020-08-10 ENCOUNTER — TELEPHONE (OUTPATIENT)
Dept: CARDIOLOGY | Age: 60
End: 2020-08-10

## 2020-08-10 ENCOUNTER — HOSPITAL ENCOUNTER (OUTPATIENT)
Dept: NON INVASIVE DIAGNOSTICS | Age: 60
Discharge: HOME | End: 2020-08-10
Payer: COMMERCIAL

## 2020-08-10 LAB
CHOLESTEROL/HDL RATIO: 2
CHOLESTEROL: 120 MG/DL
ESTIMATED AVERAGE GLUCOSE: 117 MG/DL
HBA1C MFR BLD: 5.7 % (ref 4.8–5.9)
HDLC SERPL-MCNC: 60 MG/DL
LDL CHOLESTEROL: 49 MG/DL (ref 0–130)
LV EF: 55 %
LVEF MODALITY: NORMAL
TRIGL SERPL-MCNC: 55 MG/DL
VLDLC SERPL CALC-MCNC: NORMAL MG/DL (ref 1–30)

## 2020-08-10 PROCEDURE — 83036 HEMOGLOBIN GLYCOSYLATED A1C: CPT

## 2020-08-10 PROCEDURE — 80061 LIPID PANEL: CPT

## 2020-08-10 PROCEDURE — 93306 TTE W/DOPPLER COMPLETE: CPT

## 2020-08-10 PROCEDURE — 36415 COLL VENOUS BLD VENIPUNCTURE: CPT

## 2020-09-17 RX ORDER — ATORVASTATIN CALCIUM 40 MG/1
TABLET, FILM COATED ORAL
Qty: 90 TABLET | Refills: 3 | Status: SHIPPED | OUTPATIENT
Start: 2020-09-17 | End: 2021-09-20

## 2020-09-17 NOTE — TELEPHONE ENCOUNTER
Last Appt:  8/6/2020  Next Appt:   2/18/2021  Med verified in [facility]

## 2020-10-08 ENCOUNTER — TELEPHONE (OUTPATIENT)
Dept: INTERNAL MEDICINE | Age: 60
End: 2020-10-08

## 2020-10-08 NOTE — TELEPHONE ENCOUNTER
Patient called in asking for an appointment with PCP for cough, ST, hoarse voice and left shoulder pain. No known exposure to COVID-19. Denies fever, sob, or chest pain. Suggested Urgent Care visit. He plans to go in tomorrow. Discussed UC hrs for sooner eval if worsening.

## 2020-11-03 ENCOUNTER — HOSPITAL ENCOUNTER (OUTPATIENT)
Dept: GENERAL RADIOLOGY | Age: 60
Discharge: HOME | End: 2020-11-05
Payer: COMMERCIAL

## 2020-11-03 ENCOUNTER — OFFICE VISIT (OUTPATIENT)
Dept: ORTHOPEDIC SURGERY | Age: 60
End: 2020-11-03
Payer: COMMERCIAL

## 2020-11-03 VITALS
HEART RATE: 80 BPM | BODY MASS INDEX: 33.03 KG/M2 | WEIGHT: 223 LBS | SYSTOLIC BLOOD PRESSURE: 142 MMHG | HEIGHT: 69 IN | DIASTOLIC BLOOD PRESSURE: 84 MMHG

## 2020-11-03 PROCEDURE — 20610 DRAIN/INJ JOINT/BURSA W/O US: CPT | Performed by: NURSE PRACTITIONER

## 2020-11-03 PROCEDURE — 73562 X-RAY EXAM OF KNEE 3: CPT

## 2020-11-03 PROCEDURE — 99213 OFFICE O/P EST LOW 20 MIN: CPT | Performed by: NURSE PRACTITIONER

## 2020-11-03 RX ORDER — TRIAMCINOLONE ACETONIDE 40 MG/ML
80 INJECTION, SUSPENSION INTRA-ARTICULAR; INTRAMUSCULAR ONCE
Status: COMPLETED | OUTPATIENT
Start: 2020-11-03 | End: 2020-11-03

## 2020-11-03 RX ORDER — BUPIVACAINE HYDROCHLORIDE 5 MG/ML
5 INJECTION, SOLUTION PERINEURAL ONCE
Status: COMPLETED | OUTPATIENT
Start: 2020-11-03 | End: 2020-11-03

## 2020-11-03 RX ADMIN — TRIAMCINOLONE ACETONIDE 80 MG: 40 INJECTION, SUSPENSION INTRA-ARTICULAR; INTRAMUSCULAR at 08:48

## 2020-11-03 RX ADMIN — TRIAMCINOLONE ACETONIDE 80 MG: 40 INJECTION, SUSPENSION INTRA-ARTICULAR; INTRAMUSCULAR at 08:50

## 2020-11-03 RX ADMIN — BUPIVACAINE HYDROCHLORIDE 25 MG: 5 INJECTION, SOLUTION PERINEURAL at 08:48

## 2020-11-03 RX ADMIN — BUPIVACAINE HYDROCHLORIDE 25 MG: 5 INJECTION, SOLUTION PERINEURAL at 08:46

## 2020-11-03 NOTE — PROGRESS NOTES
Orthopaedic Progress Note      CHIEF COMPLAINT: Bilateral knee pain    HISTORY OF PRESENT ILLNESS:       Mr. Harman Larson  is a 64 y.o. male  who presents with bilateral knee pain. Patient was last seen back in August 2019 where he did have a injection into his right knee with corticosteroids at that time. He states the pain has worsened over the summer. 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 He also has a physical job that requires standing on concrete climbing stairs and being in bucket trucks. Patient states the pain has increased to his right knee with prolonged walking and standing and getting up from a seated position. He has significant pain with stairs. He has tried activity modifications. He is a very active individual on a daily basis. He has had a knee replacement with Dr. China Montanez approximately 4 years ago and has done well. He states in the last few months he has had increased pain and swelling to the left knee. There were no injuries that he can recall. He denies any numbness or tingling. He states that his back pain and numbness and tingling in legs has improved by going to the chiropractor. He also wears bilateral compression knee braces.       Past Medical History:    Past Medical History:   Diagnosis Date    Bunion     right    CAD (coronary artery disease)     Diverticul disease small and large intestine, no perforati or abscess     GERD (gastroesophageal reflux disease)     Hammer toe     right    Heart attack ([facility] [address] 11/09/2009    Hyperlipidemia     Hypertension     Pericarditis     Positive cardiac stress test     Smoking history        Past Surgical History:    Past Surgical History:   Procedure Laterality Date    CARDIAC CATHETERIZATION      COLONOSCOPY  01/08/2018    diverticulosis,[id number]    FOOT SURGERY      KNEE ARTHROSCOPY  7-2006, 1-2014    TOTAL KNEE ARTHROPLASTY Left 2/26/2019    Left Total Knee Arthroplasty and injection of right knee performed by Lucy Ba MD at [address] [facility]           Current  Medications:  Current Outpatient Medications   Medication Sig Dispense Refill    atorvastatin (LIPITOR) 40 MG tablet Take 1 tablet by mouth nightly 90 tablet 3    lisinopril (PRINIVIL;ZESTRIL) 20 MG tablet Take 1 tablet by mouth once daily 90 tablet 3    metoprolol tartrate (LOPRESSOR) 25 MG tablet Take 1 tablet by mouth twice daily 180 tablet 3    sildenafil (REVATIO) 20 MG tablet Take 1 tablet by mouth daily as needed (prn) 5 tablet 3    ibuprofen (ADVIL;MOTRIN) 200 MG tablet Take 200 mg by mouth every 6 hours as needed for Pain      aspirin 81 MG tablet Take 81 mg by mouth daily      Handicap Placard MISC by Does not apply route Valid for 90 days     s/p left total knee arthroplasty 1 each 0    esomeprazole (NEXIUM) 20 MG delayed release capsule Take 20 mg by mouth daily      calcium carbonate 600 MG TABS tablet Take 1 tablet by mouth daily       No current facility-administered medications for this visit. Allergies:  Ciprofloxacin    Social History:   Social History     Tobacco Use   Smoking Status Current Every Day Smoker    Packs/day: 0.50    Years: 30.00    Pack years: 15.00    Types: Cigarettes   Smokeless Tobacco Never Used     Social History     Substance and Sexual Activity   Alcohol Use Yes    Alcohol/week: 12.0 standard drinks    Types: 12 Standard drinks or equivalent per week    Comment: daily 1 beer     Social History     Substance and Sexual Activity   Drug Use No       Family History:  Family History   Problem Relation Age of Onset    Diabetes Mother     Early Death Brother 16        MVA    Early Death Sister 46        blood clot    Heart Disease Other        REVIEW OF SYSTEMS:  Constitutional: Denies any fever, chills. Musculoskeletal: Positive for bilateral knee pain and joint effusion.       PHYSICAL EXAM:  [unfilled]  General appearance:  Alert and oriented x 3. No apparent distress, appears stated age, calm and cooperative. Musculoskeletal: Right lower extremity was examined. Skin is intact no rashes lesions or drainage. He has global pain to palpation over the right knee. More specifically to anterior and medial side. He denies calf pain to palpation. Toe flexion and extension is intact. He can flex the knee to about 110 degrees. He does lack a few degrees of full extension. Good stability to varus and valgus stress testing. The left lower extremity was examined. Skin is intact no rashes lesions or drainage. A small joint effusion is noted. There is no increased redness warmth or cellulitis noted. Knee motion is good with flexion around 115 degrees and full extension is noted. Good stability to varus and valgus stress testing. He denies any pain to palpation over bilateral Pez anserine bursa's. [place] DATA:  CBC:   Lab Results   Component Value Date    WBC 15.4 02/27/2019    HGB 13.2 02/27/2019     02/27/2019     BMP:    Lab Results   Component Value Date     12/05/2019    K 4.6 12/05/2019    CL 96 12/05/2019    CO2 25 12/05/2019    BUN 11 12/05/2019    CREATININE 0.78 12/05/2019    CALCIUM 9.9 12/05/2019    GLUCOSE 86 12/05/2019     PT/INR:    Lab Results   Component Value Date    PROTIME 10.4 02/25/2019    INR 1.0 02/25/2019     Troponin:  No results found for: TROPONINI  No results for input(s): LIPASE, AMYLASE in the last 72 hours. No results for input(s): AST, ALT, BILIDIR, BILITOT, ALKPHOS in the last 72 hours.   Uric Acid:  No components found for: URIC  Urine Culture:  No components found for: CURINE    Radiology:   Xr Knee Right (3 Views)    Result Date: 11/3/2020  EXAMINATION: THREE XRAY VIEWS OF THE RIGHT KNEE 11/3/2020 7:41 am COMPARISON: 09/19/2016 HISTORY: ORDERING SYSTEM PROVIDED HISTORY: Chronic pain of right knee TECHNOLOGIST PROVIDED HISTORY: Reason for Exam: C/o chronic knee pain Acuity: Chronic Type of Exam: Unknown FINDINGS: Moderate medial compartmental joint space narrowing. Moderate osteophytes. Small osteophytes laterally. Lateral view demonstrates possible free intra-articular corticated body posteriorly. Other bodies may be present. The largest measures approximately 2.5 cm. Advanced degenerative changes patellofemoral space. No fracture or effusion. Severe degenerative changes about the right knee. Free intra-articular bodies suspected. The changes about the weight-bearing surface of the medial femoral condyle appear worse with some cortical irregularity currently. X-rays personally reviewed by me of x-rays 3 views of the right knee does show advancing severe degeneration of the right knee medial compartment. Also noted to have patellofemoral joint osteoarthritis. No acute fractures noted. Diagnosis Orders   1. Pain in both knees, unspecified chronicity  [place] ARTHROCENTESIS ASPIR&/INJ MAJOR JT/BURSA W/O US    triamcinolone acetonide (KENALOG-40) injection 80 mg    bupivacaine (MARCAINE) 0.5 % injection 25 mg    triamcinolone acetonide (KENALOG-40) injection 80 mg    bupivacaine (MARCAINE) 0.5 % injection 25 mg   2. Pain and swelling of left knee     3. Localized osteoarthritis of right knee           PLAN:  Plan at this time did discuss options with patient he is agreeable to trying bilateral corticosteroid injections into his knees today. We will pre-CERT him for a Synvisc injection into his right knee. Patient also discussed risks and benefits about a right knee replacement. He states he will try the corticosteroid injections and see if it gives him relief of pain and swelling. We will see him back as needed for further knee pain. No orders of the defined types were placed in this encounter. Procedure: Lateral knees were prepped in sterile fashion with alcohol. A 22-gauge needle was introduced into the left and right inferolateral portals of the knees.   5 mils of half percent Marcaine 80 mg of Kenalog were injected into each knee. Hemostasis achieved, dry sterile bandage applied. Patient tolerated procedure well.       Electronically signed by PAULA Penny CNP on 11/3/2020 at 8:37 AM

## 2020-11-24 ENCOUNTER — TELEPHONE (OUTPATIENT)
Dept: INTERNAL MEDICINE | Age: 60
End: 2020-11-24

## 2020-11-24 NOTE — TELEPHONE ENCOUNTER
Patient called in stating that he has been trying the revatio and that it is not working for him, he would like to know if something different can be sent into [facility] in [address] Please advise.      Last Appt:  12/2/2019  Next Appt:   12/4/2020  Med verified in Epic

## 2020-11-25 RX ORDER — SILDENAFIL 50 MG/1
50 TABLET, FILM COATED ORAL PRN
Qty: 30 TABLET | Refills: 3 | Status: SHIPPED | OUTPATIENT
Start: 2020-11-25 | End: 2022-02-07

## 2020-11-25 NOTE — TELEPHONE ENCOUNTER
RX Pended. Spoke with patient and he would like his rx to go to [facility], faxed coupon to the pharmacy.

## 2020-11-25 NOTE — TELEPHONE ENCOUNTER
Patient can get 30 tablets at [facility] for $13.09 with a goodrx coupon. I can print off and fax to [facility] with his name and  if he would like.      If he wants it at [facility] it would be $33.22

## 2021-02-18 ENCOUNTER — OFFICE VISIT (OUTPATIENT)
Dept: CARDIOLOGY | Age: 61
End: 2021-02-18
Payer: COMMERCIAL

## 2021-02-18 VITALS
WEIGHT: 224 LBS | HEIGHT: 71 IN | HEART RATE: 77 BPM | BODY MASS INDEX: 31.36 KG/M2 | SYSTOLIC BLOOD PRESSURE: 161 MMHG | DIASTOLIC BLOOD PRESSURE: 78 MMHG

## 2021-02-18 DIAGNOSIS — R06.02 SOB (SHORTNESS OF BREATH): ICD-10-CM

## 2021-02-18 DIAGNOSIS — I10 ESSENTIAL HYPERTENSION: Primary | ICD-10-CM

## 2021-02-18 DIAGNOSIS — I25.10 CORONARY ARTERY DISEASE INVOLVING NATIVE CORONARY ARTERY OF NATIVE HEART WITHOUT ANGINA PECTORIS: ICD-10-CM

## 2021-02-18 PROCEDURE — 99214 OFFICE O/P EST MOD 30 MIN: CPT | Performed by: INTERNAL MEDICINE

## 2021-02-18 PROCEDURE — 93000 ELECTROCARDIOGRAM COMPLETE: CPT | Performed by: INTERNAL MEDICINE

## 2021-02-18 NOTE — PROGRESS NOTES
Today's Date: 2/18/2021  Patient Name: Wolfgang Rincon  Patient's age: 64 y.o., 1960          CC: the patient is a 64 y.o.  male is in the office for routine follow-up. Fairly stable from cardiac standpoint and denies having any episodes of chest pain or discomfort  He had COVID-19 infection in January 2021, did not require any hospitalization, had only mild symptoms but reports slightly increased shortness of breath on exertion since then. No orthopnea, lower extremity edema or PND        Past Medical History:   has a past medical history of Bunion, CAD (coronary artery disease), Diverticul disease small and large intestine, no perforati or abscess, GERD (gastroesophageal reflux disease), Hammer toe, Heart attack ([address] Hyperlipidemia, Hypertension, Pericarditis, Positive cardiac stress test, and Smoking history. Past Surgical History:   has a past surgical history that includes Upper gastrointestinal endoscopy; Knee arthroscopy (7-2006, 1-2014); Foot surgery; Cardiac catheterization; Colonoscopy (01/08/2018); and Total knee arthroplasty (Left, 2/26/2019). Home Medications:    Prior to Admission medications    Medication Sig Start Date End Date Taking?  Authorizing Provider   sildenafil (VIAGRA) 50 MG tablet Take 1 tablet by mouth as needed for Erectile Dysfunction 11/25/20   Shashi Blanca MD   atorvastatin (LIPITOR) 40 MG tablet Take 1 tablet by mouth nightly 9/17/20   Swetha Meehan MD   lisinopril (PRINIVIL;ZESTRIL) 20 MG tablet Take 1 tablet by mouth once daily 6/15/20   Shashi Blanca MD   metoprolol tartrate (LOPRESSOR) 25 MG tablet Take 1 tablet by mouth twice daily 3/10/20   Rajeev Thomas DO   sildenafil (REVATIO) 20 MG tablet Take 1 tablet by mouth daily as needed (prn) 12/2/19   Shashi Blanca MD   ibuprofen (ADVIL;MOTRIN) 200 MG tablet Take 200 mg by mouth every 6 hours as needed for Pain    Historical Provider, MD   aspirin 81 MG tablet Take hours.  BMP: No results for input(s): NA, K, CO2, BUN, CREATININE, LABGLOM, GLUCOSE in the last 72 hours. PT/INR: No results for input(s): PROTIME, INR in the last 72 hours. FASTING LIPID PANEL:  Lab Results   Component Value Date    HDL 60 08/10/2020    TRIG 55 08/10/2020     LIVER PROFILE:No results for input(s): AST, ALT, LABALBU in the last 72 hours. Assessment:    · CAD S/P PCI to D1 and LCX with Chronic RCA occlusion with left to right collaterals in 2016. Preserved LV systolic function.   · COVID-19 infection January 2021  · HTN  · HLP: on statin  · Chronic smoking  · OA   · Obesity      Patient Active Problem List   Diagnosis    Hearing loss    GERD (gastroesophageal reflux disease)    Right knee pain    HTN (hypertension)    Arm pain    S/P cardiac cath    Coronary artery disease involving native coronary artery of native heart without angina pectoris    Anxiety    Primary osteoarthritis of left knee    Osteoarthritis of left knee    Status post left knee replacement       Recommendations:  Continue current medical therapy with aspirin, Lipitor, metoprolol and lisinopril  Recommend monitoring blood pressure at home, if persistently high will increase lisinopril to 30 mg daily  Pharmacological stress test during next visit once he is recovered from COVID-19 infection  Aggressive risk factor modification including complete smoking cessation discussed with patient in detail, he verbalized understanding and will continue to cut down  Routine follow-up in 6 months or earlier if needed        Yury Miles [address] Cardiology Consult           [phone]

## 2021-03-05 ENCOUNTER — HOSPITAL ENCOUNTER (OUTPATIENT)
Dept: LAB | Age: 61
Discharge: HOME | End: 2021-03-05
Payer: COMMERCIAL

## 2021-03-05 DIAGNOSIS — R73.01 IFG (IMPAIRED FASTING GLUCOSE): ICD-10-CM

## 2021-03-05 DIAGNOSIS — I10 ESSENTIAL HYPERTENSION: ICD-10-CM

## 2021-03-05 DIAGNOSIS — Z12.5 ENCOUNTER FOR SCREENING FOR MALIGNANT NEOPLASM OF PROSTATE: ICD-10-CM

## 2021-03-05 DIAGNOSIS — I25.10 CORONARY ARTERY DISEASE INVOLVING NATIVE CORONARY ARTERY OF NATIVE HEART WITHOUT ANGINA PECTORIS: ICD-10-CM

## 2021-03-05 DIAGNOSIS — Z00.00 GENERAL MEDICAL EXAM: ICD-10-CM

## 2021-03-05 DIAGNOSIS — Z13.220 SCREENING CHOLESTEROL LEVEL: ICD-10-CM

## 2021-03-05 LAB
ALBUMIN SERPL-MCNC: 4 G/DL (ref 3.5–5.2)
ALBUMIN/GLOBULIN RATIO: 1.5 (ref 1–2.5)
ALP BLD-CCNC: 73 U/L (ref 40–129)
ALT SERPL-CCNC: 17 U/L (ref 5–41)
ANION GAP SERPL CALCULATED.3IONS-SCNC: 9 MMOL/L (ref 9–17)
AST SERPL-CCNC: 20 U/L
BILIRUB SERPL-MCNC: 0.82 MG/DL (ref 0.3–1.2)
BUN BLDV-MCNC: 15 MG/DL (ref 6–20)
BUN/CREAT BLD: 19 (ref 9–20)
CALCIUM SERPL-MCNC: 9.4 MG/DL (ref 8.6–10.4)
CHLORIDE BLD-SCNC: 100 MMOL/L (ref 98–107)
CHOLESTEROL/HDL RATIO: 2
CHOLESTEROL: 119 MG/DL
CO2: 26 MMOL/L (ref 20–31)
CREAT SERPL-MCNC: 0.8 MG/DL (ref 0.7–1.2)
ESTIMATED AVERAGE GLUCOSE: 117 MG/DL
GFR AFRICAN AMERICAN: >60 ML/MIN
GFR NON-AFRICAN AMERICAN: >60 ML/MIN
GFR SERPL CREATININE-BSD FRML MDRD: ABNORMAL ML/MIN/{1.73_M2}
GFR SERPL CREATININE-BSD FRML MDRD: ABNORMAL ML/MIN/{1.73_M2}
GLUCOSE BLD-MCNC: 109 MG/DL (ref 70–99)
HBA1C MFR BLD: 5.7 % (ref 4–6)
HDLC SERPL-MCNC: 61 MG/DL
LDL CHOLESTEROL: 47 MG/DL (ref 0–130)
POTASSIUM SERPL-SCNC: 4.9 MMOL/L (ref 3.7–5.3)
PROSTATE SPECIFIC ANTIGEN: 1.97 UG/L
SODIUM BLD-SCNC: 135 MMOL/L (ref 135–144)
TOTAL PROTEIN: 6.6 G/DL (ref 6.4–8.3)
TRIGL SERPL-MCNC: 57 MG/DL
VLDLC SERPL CALC-MCNC: NORMAL MG/DL (ref 1–30)

## 2021-03-05 PROCEDURE — G0103 PSA SCREENING: HCPCS

## 2021-03-05 PROCEDURE — 80061 LIPID PANEL: CPT

## 2021-03-05 PROCEDURE — 80053 COMPREHEN METABOLIC PANEL: CPT

## 2021-03-05 PROCEDURE — 83036 HEMOGLOBIN GLYCOSYLATED A1C: CPT

## 2021-03-05 PROCEDURE — 36415 COLL VENOUS BLD VENIPUNCTURE: CPT

## 2021-03-12 ENCOUNTER — OFFICE VISIT (OUTPATIENT)
Dept: INTERNAL MEDICINE | Age: 61
End: 2021-03-12
Payer: COMMERCIAL

## 2021-03-12 VITALS
WEIGHT: 240 LBS | SYSTOLIC BLOOD PRESSURE: 130 MMHG | BODY MASS INDEX: 33.6 KG/M2 | DIASTOLIC BLOOD PRESSURE: 80 MMHG | HEART RATE: 76 BPM | HEIGHT: 71 IN | RESPIRATION RATE: 16 BRPM

## 2021-03-12 DIAGNOSIS — I25.10 CORONARY ARTERY DISEASE INVOLVING NATIVE CORONARY ARTERY OF NATIVE HEART WITHOUT ANGINA PECTORIS: ICD-10-CM

## 2021-03-12 DIAGNOSIS — R29.898 RIGHT LEG WEAKNESS: ICD-10-CM

## 2021-03-12 DIAGNOSIS — I10 ESSENTIAL HYPERTENSION: ICD-10-CM

## 2021-03-12 DIAGNOSIS — Z12.5 SPECIAL SCREENING FOR MALIGNANT NEOPLASM OF PROSTATE: ICD-10-CM

## 2021-03-12 DIAGNOSIS — R60.0 BILATERAL LEG EDEMA: ICD-10-CM

## 2021-03-12 DIAGNOSIS — Z00.00 GENERAL MEDICAL EXAM: Primary | ICD-10-CM

## 2021-03-12 DIAGNOSIS — R73.01 IFG (IMPAIRED FASTING GLUCOSE): ICD-10-CM

## 2021-03-12 DIAGNOSIS — R20.0 NUMBNESS OF RIGHT FOOT: ICD-10-CM

## 2021-03-12 PROCEDURE — 99396 PREV VISIT EST AGE 40-64: CPT | Performed by: INTERNAL MEDICINE

## 2021-03-12 RX ORDER — FUROSEMIDE 20 MG/1
20 TABLET ORAL DAILY
Qty: 90 TABLET | Refills: 3 | Status: SHIPPED | OUTPATIENT
Start: 2021-03-12 | End: 2021-03-12 | Stop reason: ALTCHOICE

## 2021-03-12 ASSESSMENT — ENCOUNTER SYMPTOMS
EYE PAIN: 0
NAUSEA: 0
ABDOMINAL PAIN: 0
VOMITING: 0
DIARRHEA: 0
COUGH: 0
CONSTIPATION: 0
BACK PAIN: 0
BLOOD IN STOOL: 0
SHORTNESS OF BREATH: 0

## 2021-03-12 ASSESSMENT — PATIENT HEALTH QUESTIONNAIRE - PHQ9
SUM OF ALL RESPONSES TO PHQ QUESTIONS 1-9: 0
2. FEELING DOWN, DEPRESSED OR HOPELESS: 0

## 2021-03-12 NOTE — PROGRESS NOTES
[address]  [address] [id number]  Dept: [phone]  Dept Fax: [phone]  Loc: [phone]     Bishop Lin is a 64 y.o. male who presents today for his medical conditions/complaintsas noted below. Bishop Lin is c/o of   Chief Complaint   Patient presents with    Annual Exam     right foot numbness, both legs are swelling up by the end of the day, is his losing his balance and his right leg gives out, he is due for another shot in his right knee    Hypertension    Coronary Artery Disease         HPI:     Hypertension  This is a chronic problem. The current episode started more than 1 year ago. The problem has been waxing and waning since onset. The problem is controlled. Pertinent negatives include no chest pain, headaches, neck pain, palpitations or shortness of breath. Coronary Artery Disease  Presents for follow-up visit. Pertinent negatives include no chest pain, chest pressure, dizziness, leg swelling, palpitations or shortness of breath. The symptoms have been stable. Compliance with diet is good. Compliance with exercise is good. Compliance with medications is good. Other  This is a recurrent (3-General medical exam) problem. The current episode started today. The problem occurs intermittently. The problem has been waxing and waning. Pertinent negatives include no abdominal pain, arthralgias, chest pain, chills, coughing, fever, headaches, nausea, neck pain, numbness, rash, vomiting or weakness.        Hemoglobin A1C (%)   Date Value   03/05/2021 5.7   08/10/2020 5.7   12/05/2019 5.8            No results found for: LABMICR  LDL Cholesterol (mg/dL)   Date Value   03/05/2021 47   08/10/2020 49   12/05/2019 56         AST (U/L)   Date Value   03/05/2021 20     ALT (U/L)   Date Value   03/05/2021 17     BUN (mg/dL)   Date Value   03/05/2021 15     BP Readings from Last 3 Encounters:   03/12/21 mg by mouth daily      calcium carbonate 600 MG TABS tablet Take 1 tablet by mouth daily      sildenafil (VIAGRA) 50 MG tablet Take 1 tablet by mouth as needed for Erectile Dysfunction 30 tablet 3    sildenafil (REVATIO) 20 MG tablet Take 1 tablet by mouth daily as needed (prn) 5 tablet 3    Handicap Placard MISC by Does not apply route Valid for 90 days     s/p left total knee arthroplasty 1 each 0     No current facility-administered medications for this visit. Allergies   Allergen Reactions    Ciprofloxacin Itching and Rash       Health Maintenance   Topic Date Due    Hepatitis C screen  Never done    Pneumococcal 0-64 years Vaccine (1 of 1 - PPSV23) Never done    HIV screen  Never done    COVID-19 Vaccine (1) Never done    Shingles Vaccine (1 of 2) Never done    Flu vaccine (1) Never done    A1C test (Diabetic or Prediabetic)  03/05/2022    Lipid screen  03/05/2022    Potassium monitoring  03/05/2022    Creatinine monitoring  03/05/2022    Colon cancer screen colonoscopy  01/08/2023    DTaP/Tdap/Td vaccine (2 - Td) 09/23/2024    Hepatitis A vaccine  Aged Out    Hepatitis B vaccine  Aged Out    Hib vaccine  Aged Out    Meningococcal (ACWY) vaccine  Aged Out       Subjective:      Review of Systems   Constitutional: Negative for chills and fever. HENT: Negative for hearing loss. Eyes: Negative for pain and visual disturbance. Respiratory: Negative for cough and shortness of breath. Cardiovascular: Negative for chest pain, palpitations and leg swelling. Gastrointestinal: Negative for abdominal pain, blood in stool, constipation, diarrhea, nausea and vomiting. Endocrine: Negative for cold intolerance, polydipsia and polyuria. Genitourinary: Negative for difficulty urinating, dysuria and hematuria. Musculoskeletal: Negative for arthralgias, back pain, gait problem and neck pain. Skin: Negative for pallor and rash.    Neurological: Negative for dizziness, weakness, numbness and headaches. Hematological: Negative for adenopathy. Does not bruise/bleed easily. Psychiatric/Behavioral: Negative for confusion. The patient is not nervous/anxious. Objective:     Physical Exam  Vitals signs reviewed. Constitutional:       Appearance: He is well-developed. HENT:      Head: Normocephalic and atraumatic. Eyes:      Pupils: Pupils are equal, round, and reactive to light. Neck:      Musculoskeletal: Neck supple. Cardiovascular:      Rate and Rhythm: Normal rate and regular rhythm. Heart sounds: No murmur. No friction rub. No gallop. Pulmonary:      Effort: Pulmonary effort is normal.      Breath sounds: Normal breath sounds. No wheezing or rales. Abdominal:      General: There is no distension. Palpations: Abdomen is soft. There is no mass. Tenderness: There is no abdominal tenderness. There is no rebound. Musculoskeletal: Normal range of motion. Lymphadenopathy:      Cervical: No cervical adenopathy. Skin:     General: Skin is warm and dry. Findings: No rash. Neurological:      Mental Status: He is alert and oriented to person, place, and time. Cranial Nerves: No cranial nerve deficit (grossly). Psychiatric:         Thought Content: Thought content normal.        /80 (Site: Left Upper Arm, Position: Sitting, Cuff Size: Large Adult)   Pulse 76   Resp 16   Ht 5' 11\" (1.803 m)   Wt 240 lb (108.9 kg)   BMI 33.47 kg/m²     Assessment:       Diagnosis Orders   1. General medical exam  Comprehensive Metabolic Panel, Fasting   2. Essential hypertension  Comprehensive Metabolic Panel, Fasting    DISCONTINUED: furosemide (LASIX) 20 MG tablet   3. Coronary artery disease involving native coronary artery of native heart without angina pectoris  Lipid Panel   4. Bilateral leg edema  DISCONTINUED: furosemide (LASIX) 20 MG tablet   5. IFG (impaired fasting glucose)  Hemoglobin A1C   6.  Special screening for malignant neoplasm of prostate  PSA Screening   7. Numbness of right foot  EMG   8. Right leg weakness  EMG      Describes more of the right knee pain and right knee giving out, rather than true right leg weakness. Plan:       Return in about 1 year (around 3/14/2022) for Annual Physical, Hypertension, CAD. Orders Placed This Encounter   Procedures    Comprehensive Metabolic Panel, Fasting     Standing Status:   Future     Standing Expiration Date:   9/12/2022    Lipid Panel     Standing Status:   Future     Standing Expiration Date:   9/12/2022     Order Specific Question:   Is Patient Fasting?/# of Hours     Answer:   yes    Hemoglobin A1C     Standing Status:   Future     Standing Expiration Date:   9/12/2022    PSA Screening     Standing Status:   Future     Standing Expiration Date:   9/12/2022    EMG     Standing Status:   Future     Standing Expiration Date:   5/11/2021     Order Specific Question:   Which body part? Answer:   right leg     Orders Placed This Encounter   Medications    DISCONTD: furosemide (LASIX) 20 MG tablet     Sig: Take 1 tablet by mouth daily     Dispense:  90 tablet     Refill:  3       Patientgiven educational materials - see patient instructions. Discussed use, benefit,and side effects of prescribed medications. All patient questions answered. Ptvoiced understanding. Reviewed health maintenance. Instructed to continue currentmedications, diet and exercise. Patient agreed with treatment plan. Follow up asdirected.      Electronically signed by Dilshad Bergman MD on 3/12/2021 at 12:41 PM

## 2021-04-15 ENCOUNTER — TELEPHONE (OUTPATIENT)
Dept: ORTHOPEDIC SURGERY | Age: 61
End: 2021-04-15

## 2021-06-24 RX ORDER — LISINOPRIL 20 MG/1
TABLET ORAL
Qty: 90 TABLET | Refills: 3 | Status: SHIPPED | OUTPATIENT
Start: 2021-06-24 | End: 2022-07-07

## 2021-07-26 DIAGNOSIS — M25.462 PAIN AND SWELLING OF LEFT KNEE: Primary | ICD-10-CM

## 2021-07-26 DIAGNOSIS — M25.562 PAIN AND SWELLING OF LEFT KNEE: Primary | ICD-10-CM

## 2021-07-27 ENCOUNTER — OFFICE VISIT (OUTPATIENT)
Dept: ORTHOPEDIC SURGERY | Age: 61
End: 2021-07-27
Payer: COMMERCIAL

## 2021-07-27 ENCOUNTER — HOSPITAL ENCOUNTER (OUTPATIENT)
Age: 61
Setting detail: SPECIMEN
Discharge: HOME | End: 2021-07-27
Payer: COMMERCIAL

## 2021-07-27 ENCOUNTER — HOSPITAL ENCOUNTER (OUTPATIENT)
Dept: GENERAL RADIOLOGY | Age: 61
Discharge: HOME | End: 2021-07-29
Payer: COMMERCIAL

## 2021-07-27 VITALS
HEART RATE: 62 BPM | HEIGHT: 71 IN | BODY MASS INDEX: 33.6 KG/M2 | WEIGHT: 240 LBS | OXYGEN SATURATION: 94 % | SYSTOLIC BLOOD PRESSURE: 146 MMHG | DIASTOLIC BLOOD PRESSURE: 82 MMHG

## 2021-07-27 DIAGNOSIS — M25.562 PAIN AND SWELLING OF LEFT KNEE: ICD-10-CM

## 2021-07-27 DIAGNOSIS — M25.462 PAIN AND SWELLING OF LEFT KNEE: ICD-10-CM

## 2021-07-27 DIAGNOSIS — G89.29 CHRONIC PAIN OF RIGHT KNEE: Primary | ICD-10-CM

## 2021-07-27 DIAGNOSIS — M25.562 LEFT KNEE PAIN, UNSPECIFIED CHRONICITY: ICD-10-CM

## 2021-07-27 DIAGNOSIS — M25.561 CHRONIC PAIN OF RIGHT KNEE: Primary | ICD-10-CM

## 2021-07-27 PROCEDURE — 87070 CULTURE OTHR SPECIMN AEROBIC: CPT

## 2021-07-27 PROCEDURE — 89051 BODY FLUID CELL COUNT: CPT

## 2021-07-27 PROCEDURE — 20610 DRAIN/INJ JOINT/BURSA W/O US: CPT | Performed by: PHYSICIAN ASSISTANT

## 2021-07-27 PROCEDURE — 87075 CULTR BACTERIA EXCEPT BLOOD: CPT

## 2021-07-27 PROCEDURE — 99212 OFFICE O/P EST SF 10 MIN: CPT | Performed by: PHYSICIAN ASSISTANT

## 2021-07-27 PROCEDURE — 87205 SMEAR GRAM STAIN: CPT

## 2021-07-27 PROCEDURE — 73562 X-RAY EXAM OF KNEE 3: CPT

## 2021-07-27 RX ORDER — BUPIVACAINE HYDROCHLORIDE 5 MG/ML
5 INJECTION, SOLUTION PERINEURAL ONCE
Status: COMPLETED | OUTPATIENT
Start: 2021-07-27 | End: 2021-07-27

## 2021-07-27 RX ORDER — TRIAMCINOLONE ACETONIDE 40 MG/ML
80 INJECTION, SUSPENSION INTRA-ARTICULAR; INTRAMUSCULAR ONCE
Status: COMPLETED | OUTPATIENT
Start: 2021-07-27 | End: 2021-07-27

## 2021-07-27 RX ORDER — COVID-19 ANTIGEN TEST
220 KIT MISCELLANEOUS PRN
Status: ON HOLD | COMMUNITY
End: 2022-03-09 | Stop reason: HOSPADM

## 2021-07-27 RX ADMIN — BUPIVACAINE HYDROCHLORIDE 25 MG: 5 INJECTION, SOLUTION PERINEURAL at 09:02

## 2021-07-27 RX ADMIN — TRIAMCINOLONE ACETONIDE 80 MG: 40 INJECTION, SUSPENSION INTRA-ARTICULAR; INTRAMUSCULAR at 09:03

## 2021-07-27 NOTE — PROGRESS NOTES
Orthopaedic Progress Note      CHIEF COMPLAINT: Bilateral knee pain    HISTORY OF PRESENT ILLNESS:       Mr. Ifrah Kasper  is a 63 y.o. male  who presents with a history of bilateral knee pain. He is well-known to our practice underwent left total knee replacement 2 and half years ago. He is done fairly well with that. He states here recently he has a large joint effusion. There is no injury noted. States he does fall on a regular basis and he is uncertain why. He does have right knee pain. He has a diagnosis of primary osteoarthritis of the right knee. He is tried viscosupplementation injections as well as corticosteroid injection in the right knee in the past with no permanent relief of symptoms.   He is here today for evaluation      Past Medical History:    Past Medical History:   Diagnosis Date    Bunion     right    CAD (coronary artery disease)     Diverticul disease small and large intestine, no perforati or abscess     GERD (gastroesophageal reflux disease)     Hammer toe     right    Heart attack ([facility] [address] 11/09/2009    Hyperlipidemia     Hypertension     Pericarditis     Positive cardiac stress test     Smoking history        Past Surgical History:    Past Surgical History:   Procedure Laterality Date    CARDIAC CATHETERIZATION      COLONOSCOPY  01/08/2018    diverticulosis,[id number]    FOOT SURGERY      KNEE ARTHROSCOPY  7-2006, 1-2014    TOTAL KNEE ARTHROPLASTY Left 2/26/2019    Left Total Knee Arthroplasty and injection of right knee performed by Devon Lerma MD at [address] [place] [address]           Current  Medications:  Current Outpatient Medications   Medication Sig Dispense Refill    Naproxen Sodium (ALEVE) 220 MG CAPS Take 220 mg by mouth as needed for Pain      lisinopril (PRINIVIL;ZESTRIL) 20 MG tablet Take 1 tablet by mouth once daily 90 tablet 3    metoprolol tartrate (LOPRESSOR) 25 MG tablet Take 1 tablet by mouth twice daily 180 tablet 3    atorvastatin (LIPITOR) 40 MG tablet Take 1 tablet by mouth nightly 90 tablet 3    aspirin 81 MG tablet Take 81 mg by mouth daily      Handicap Placard MISC by Does not apply route Valid for 90 days     s/p left total knee arthroplasty 1 each 0    esomeprazole (NEXIUM) 20 MG delayed release capsule Take 20 mg by mouth daily      calcium carbonate 600 MG TABS tablet Take 1 tablet by mouth daily      sildenafil (VIAGRA) 50 MG tablet Take 1 tablet by mouth as needed for Erectile Dysfunction (Patient not taking: Reported on 7/27/2021) 30 tablet 3    sildenafil (REVATIO) 20 MG tablet Take 1 tablet by mouth daily as needed (prn) (Patient not taking: Reported on 7/27/2021) 5 tablet 3    ibuprofen (ADVIL;MOTRIN) 200 MG tablet Take 200 mg by mouth every 6 hours as needed for Pain  (Patient not taking: Reported on 7/27/2021)       No current facility-administered medications for this visit. Allergies:  Ciprofloxacin    Social History:   Social History     Tobacco Use   Smoking Status Current Every Day Smoker    Packs/day: 0.50    Years: 30.00    Pack years: 15.00    Types: Cigarettes   Smokeless Tobacco Never Used     Social History     Substance and Sexual Activity   Alcohol Use Yes    Alcohol/week: 12.0 standard drinks    Types: 12 Standard drinks or equivalent per week    Comment: daily 1 beer     Social History     Substance and Sexual Activity   Drug Use No       Family History:  Family History   Problem Relation Age of Onset    Diabetes Mother     Early Death Brother 16        MVA    Early Death Sister 46        blood clot    Heart Disease Other        REVIEW OF SYSTEMS:  Constitutional: Denies any fever, chills. Musculoskeletal: Positive for bilateral knee pain, history of left total knee replacement. PHYSICAL EXAM:  [unfilled]  General appearance:  Alert and oriented x 3. No apparent distress, appears stated age, calm and cooperative.    Musculoskeletal: Left knee was first inspected he has well-healed anterior total knee incision. He has a large palpable joint effusion noted. There is no erythema no increased warmth no cellulitis. Knee motion is full extension flexion is to 110 degrees. Good stability no calf pain is neurovascular intact left foot. Right knee was inspected skin is good repair there is no erythema increased warmth or cellulitis. He has a small abrasion of the anterior aspect of his knee from prior fall. Knee motion is full extension flexion 120 degrees. Medial joint line tenderness to palpation. Crepitus noted throughout range of motion. Positive patellofemoral ballottement and grind test noted. He is neurovascular intact the right foot. [facility]  DATA:  CBC:   Lab Results   Component Value Date    WBC 15.4 02/27/2019    HGB 13.2 02/27/2019     02/27/2019     BMP:    Lab Results   Component Value Date     03/05/2021    K 4.9 03/05/2021     03/05/2021    CO2 26 03/05/2021    BUN 15 03/05/2021    CREATININE 0.80 03/05/2021    CALCIUM 9.4 03/05/2021    GLUCOSE 109 03/05/2021     PT/INR:    Lab Results   Component Value Date    PROTIME 10.4 02/25/2019    INR 1.0 02/25/2019     Troponin:  No results found for: TROPONINI  No results for input(s): LIPASE, AMYLASE in the last 72 hours. No results for input(s): AST, ALT, BILIDIR, BILITOT, ALKPHOS in the last 72 hours. Uric Acid:  No components found for: URIC  Urine Culture:  No components found for: TAMARAINE    Radiology:   XR KNEE LEFT (3 VIEWS)    Result Date: 7/27/2021  EXAMINATION: THREE XRAY VIEWS OF THE LEFT KNEE 7/27/2021 7:42 am COMPARISON: Left knee radiographs performed 02/26/2019. HISTORY: ORDERING SYSTEM PROVIDED HISTORY: Pain and swelling of left knee TECHNOLOGIST PROVIDED HISTORY: Reason for Exam: Left knee pain, history of multiple falls, knee replacement 2 years ago. Acuity: Chronic Type of Exam: Initial FINDINGS: There is no acute osseous abnormality.   There is a total left knee arthroplasty without evidence for complication. There is no joint effusion. The periarticular soft tissues are unremarkable. Left total knee arthroplasty without evidence for complication. X-rays personally reviewed by me of 3 views left knee reveal a well-seated total knee arthroplasty with no acute bony abnormalities. X-rays of the right knee were reviewed from the past do reveal patellofemoral and medial compartment osteoarthritis. Moderate to severe nature. Diagnosis Orders   1. Chronic pain of right knee  triamcinolone acetonide (KENALOG-40) injection 80 mg    bupivacaine (MARCAINE) 0.5 % injection 25 mg   2. Left knee pain, unspecified chronicity  Body Fluid Cell Count w/ Differential    Culture, Body Fluid         PLAN:  I feel as though he benefit from left knee aspiration stating that fluid off for analysis. Also feels though he benefit from intra-articular corticosteroid injection in his right knee here today. He agreed. No orders of the defined types were placed in this encounter. Procedure: Utilizing sterile technique the superior aspect of the left knee was washed with Betadine solution and an alcohol pad. An 18-gauge needle was used to obtain interest into the left knee joint. 50 cc of straw-colored fluid were obtained. Dry sterile bandage applied. He tolerated this procedure well. This fluid was then sent off for Gram stain, cell count, crystals, aerobic and anaerobic culture and sensitivity. Utilizing sterile technique the anterior lateral portal of the right knee was washed with an alcohol pad.   A 21-gauge needle was used to introduce 5 cc of half percent Marcaine and 80 mg of Kenalog into the right knee joint this was well-tolerated good hemostasis no dry sterile bandage applied      Electronically signed by Landrum Buerger, PA-C on 7/27/2021 at 8:56 AM

## 2021-07-28 LAB
APPEARANCE FLUID: ABNORMAL
BASO FLUID: ABNORMAL %
COLOR FLUID: YELLOW
EOSINOPHIL FLUID: ABNORMAL %
FLUID DIFF COMMENT: ABNORMAL
LYMPHOCYTES, BODY FLUID: 35 %
MONOCYTE, FLUID: ABNORMAL %
NEUTROPHIL, FLUID: 16 %
OTHER CELLS FLUID: 49 %
RBC FLUID: 1685 /MM3
SPECIMEN TYPE: ABNORMAL
WBC FLUID: 248 /MM3

## 2021-07-30 LAB
CULTURE: ABNORMAL
DIRECT EXAM: ABNORMAL
Lab: ABNORMAL
SPECIMEN DESCRIPTION: ABNORMAL

## 2021-08-03 ENCOUNTER — HOSPITAL ENCOUNTER (OUTPATIENT)
Age: 61
Setting detail: SPECIMEN
Discharge: HOME | End: 2021-08-03
Payer: COMMERCIAL

## 2021-08-03 ENCOUNTER — OFFICE VISIT (OUTPATIENT)
Dept: ORTHOPEDIC SURGERY | Age: 61
End: 2021-08-03
Payer: COMMERCIAL

## 2021-08-03 VITALS — WEIGHT: 240 LBS | BODY MASS INDEX: 33.6 KG/M2 | HEIGHT: 71 IN

## 2021-08-03 DIAGNOSIS — Z01.818 PRE-OP TESTING: Primary | ICD-10-CM

## 2021-08-03 DIAGNOSIS — Z01.818 PRE-OP TESTING: ICD-10-CM

## 2021-08-03 PROCEDURE — 87075 CULTR BACTERIA EXCEPT BLOOD: CPT

## 2021-08-03 PROCEDURE — 99212 OFFICE O/P EST SF 10 MIN: CPT | Performed by: PHYSICIAN ASSISTANT

## 2021-08-03 PROCEDURE — 89051 BODY FLUID CELL COUNT: CPT

## 2021-08-03 PROCEDURE — 89060 EXAM SYNOVIAL FLUID CRYSTALS: CPT

## 2021-08-03 PROCEDURE — 87205 SMEAR GRAM STAIN: CPT

## 2021-08-03 PROCEDURE — 87070 CULTURE OTHR SPECIMN AEROBIC: CPT

## 2021-08-03 NOTE — PROGRESS NOTES
Orthopaedic Progress Note      CHIEF COMPLAINT: Left knee pain    HISTORY OF PRESENT ILLNESS:       Mr. Cullen Pimentel  is a 63 y.o. male  who presents with a history of left knee pain. He is well-known to our practice as he underwent left total knee replacement 2-1/2 years ago. Last week he was seen in this office left knee aspiration was done cultures did grow out micrococcus species. Sensitivities are still pending at this time. He states the pain is improved somewhat following the aspiration last week. He is here today for follow-up pain is a 5 out of 10 on the pain scale localized the knee worse with activity worse with weightbearing relieved only with rest.  He denies fevers chills or night sweats. He had a corticosteroid injection done the right knee at last visit states this is felt much better.       Past Medical History:    Past Medical History:   Diagnosis Date    Bunion     right    CAD (coronary artery disease)     Diverticul disease small and large intestine, no perforati or abscess     GERD (gastroesophageal reflux disease)     Hammer toe     right    Heart attack ([address] 11/09/2009    Hyperlipidemia     Hypertension     Pericarditis     Positive cardiac stress test     Smoking history        Past Surgical History:    Past Surgical History:   Procedure Laterality Date    CARDIAC CATHETERIZATION      COLONOSCOPY  01/08/2018    diverticulosis,[id number]    FOOT SURGERY      KNEE ARTHROSCOPY  7-2006, 1-2014    TOTAL KNEE ARTHROPLASTY Left 2/26/2019    Left Total Knee Arthroplasty and injection of right knee performed by Timo Ibarra MD at  [facility]           Current  Medications:  Current Outpatient Medications   Medication Sig Dispense Refill    Naproxen Sodium (ALEVE) 220 MG CAPS Take 220 mg by mouth as needed for Pain      lisinopril (PRINIVIL;ZESTRIL) 20 MG tablet Take 1 tablet by mouth once daily 90 tablet 3    metoprolol tartrate (LOPRESSOR) 25 MG tablet Take 1 tablet by mouth twice daily 180 tablet 3    sildenafil (VIAGRA) 50 MG tablet Take 1 tablet by mouth as needed for Erectile Dysfunction 30 tablet 3    atorvastatin (LIPITOR) 40 MG tablet Take 1 tablet by mouth nightly 90 tablet 3    sildenafil (REVATIO) 20 MG tablet Take 1 tablet by mouth daily as needed (prn) 5 tablet 3    ibuprofen (ADVIL;MOTRIN) 200 MG tablet Take 200 mg by mouth every 6 hours as needed for Pain       aspirin 81 MG tablet Take 81 mg by mouth daily      Handicap Placard MISC by Does not apply route Valid for 90 days     s/p left total knee arthroplasty 1 each 0    esomeprazole (NEXIUM) 20 MG delayed release capsule Take 20 mg by mouth daily      calcium carbonate 600 MG TABS tablet Take 1 tablet by mouth daily       No current facility-administered medications for this visit. Allergies:  Ciprofloxacin    Social History:   Social History     Tobacco Use   Smoking Status Current Every Day Smoker    Packs/day: 0.50    Years: 30.00    Pack years: 15.00    Types: Cigarettes   Smokeless Tobacco Never Used     Social History     Substance and Sexual Activity   Alcohol Use Yes    Alcohol/week: 12.0 standard drinks    Types: 12 Standard drinks or equivalent per week    Comment: daily 1 beer     Social History     Substance and Sexual Activity   Drug Use No       Family History:  Family History   Problem Relation Age of Onset    Diabetes Mother     Early Death Brother 16        MVA    Early Death Sister 46        blood clot    Heart Disease Other        REVIEW OF SYSTEMS:  Constitutional: Denies any fever, chills. Musculoskeletal: Positive for left total knee replacement. PHYSICAL EXAM:  [unfilled]  General appearance:  Alert and oriented x 3. No apparent distress, appears stated age, calm and cooperative. Musculoskeletal: Left knee was inspected intubates total knee incisions well-healed.   Knee motions full extension flexion is to 125 degrees. He has good stability moderate joint effusion noted he has no calf pain is neurovascular intact left foot. Kathy Bryson DATA:  CBC:   Lab Results   Component Value Date    WBC 15.4 02/27/2019    HGB 13.2 02/27/2019     02/27/2019     BMP:    Lab Results   Component Value Date     03/05/2021    K 4.9 03/05/2021     03/05/2021    CO2 26 03/05/2021    BUN 15 03/05/2021    CREATININE 0.80 03/05/2021    CALCIUM 9.4 03/05/2021    GLUCOSE 109 03/05/2021     PT/INR:    Lab Results   Component Value Date    PROTIME 10.4 02/25/2019    INR 1.0 02/25/2019     Troponin:  No results found for: TROPONINI  No results for input(s): LIPASE, AMYLASE in the last 72 hours. No results for input(s): AST, ALT, BILIDIR, BILITOT, ALKPHOS in the last 72 hours. Uric Acid:  No components found for: URIC  Urine Culture:  No components found for: TAMARAINE    Radiology:   XR KNEE LEFT (3 VIEWS)    Result Date: 7/27/2021  EXAMINATION: THREE XRAY VIEWS OF THE LEFT KNEE 7/27/2021 7:42 am COMPARISON: Left knee radiographs performed 02/26/2019. HISTORY: ORDERING SYSTEM PROVIDED HISTORY: Pain and swelling of left knee TECHNOLOGIST PROVIDED HISTORY: Reason for Exam: Left knee pain, history of multiple falls, knee replacement 2 years ago. Acuity: Chronic Type of Exam: Initial FINDINGS: There is no acute osseous abnormality. There is a total left knee arthroplasty without evidence for complication. There is no joint effusion. The periarticular soft tissues are unremarkable. Left total knee arthroplasty without evidence for complication. Diagnosis Orders   1. Pre-op testing  Culture, Body Fluid    Body Fluid Cell Count w/ Differential    Body Fluid Crystal         PLAN:  We had a long discussion with Dr. Ever Young and patient at bedside here today. We discussed the status of his infected total knee replacement.   He is doing reasonably well with this problem we elected to reaspirate the knee today to make sure this was not a false positive. We will call him with the findings of the new aspiration result. No orders of the defined types were placed in this encounter. Procedure:    Utilizing sterile technique the superior lateral aspect the left knee was washed with Betadine solution wiped free with an alcohol pad a 18-gauge needle was used to aspirate 60 cc of straw-colored fluid this was again sent to microbiology for Gram stain cell count aerobic anaerobic culture sensitivity and crystal analysis. We will call patient with the findings of this report.     Electronically signed by Philip Ruth PA-C on 8/3/2021 at 11:30 AM

## 2021-08-04 LAB
APPEARANCE FLUID: ABNORMAL
BASO FLUID: ABNORMAL %
COLOR FLUID: YELLOW
CRYSTALS, FLUID: NEGATIVE
EOSINOPHIL FLUID: ABNORMAL %
FLUID DIFF COMMENT: ABNORMAL
LYMPHOCYTES, BODY FLUID: 15 %
MONOCYTE, FLUID: ABNORMAL %
NEUTROPHIL, FLUID: 4 %
OTHER CELLS FLUID: 81 %
RBC FLUID: <3000 /MM3
SPECIMEN TYPE: ABNORMAL
SPECIMEN TYPE: NORMAL
WBC FLUID: 774 /MM3

## 2021-08-09 LAB
CULTURE: ABNORMAL
DIRECT EXAM: ABNORMAL
Lab: ABNORMAL
SPECIMEN DESCRIPTION: ABNORMAL

## 2021-08-10 ENCOUNTER — OFFICE VISIT (OUTPATIENT)
Dept: ORTHOPEDIC SURGERY | Age: 61
End: 2021-08-10
Payer: COMMERCIAL

## 2021-08-10 VITALS
HEIGHT: 71 IN | BODY MASS INDEX: 33.6 KG/M2 | DIASTOLIC BLOOD PRESSURE: 93 MMHG | HEART RATE: 72 BPM | SYSTOLIC BLOOD PRESSURE: 162 MMHG | WEIGHT: 240 LBS

## 2021-08-10 DIAGNOSIS — M25.562 LEFT KNEE PAIN, UNSPECIFIED CHRONICITY: Primary | ICD-10-CM

## 2021-08-10 PROCEDURE — 99212 OFFICE O/P EST SF 10 MIN: CPT | Performed by: PHYSICIAN ASSISTANT

## 2021-08-10 NOTE — PROGRESS NOTES
Orthopaedic Progress Note      CHIEF COMPLAINT: Left knee pain and swelling    HISTORY OF PRESENT ILLNESS:       Mr. Joy George  is a 63 y.o. male  who presents with a history of left knee pain and swelling. He has been seen in this clinic for last couple weeks. We aspirated his knee a couple weeks ago which did reveal micrococcus species. Sensitivities were still pending. We elected to repeat aspiration last week. Results of that are essentially negative. No organisms were seen. No growth whatsoever. He still continues with pain at times 4 out of 10 on a pain scale localized the knee worse with weightbearing worse activity relieved with rest.  He is here today for reevaluation. He denies fevers chills or night sweats.       Past Medical History:    Past Medical History:   Diagnosis Date    Bunion     right    CAD (coronary artery disease)     Diverticul disease small and large intestine, no perforati or abscess     GERD (gastroesophageal reflux disease)     Hammer toe     right    Heart attack ([address] 11/09/2009    Hyperlipidemia     Hypertension     Pericarditis     Positive cardiac stress test     Smoking history        Past Surgical History:    Past Surgical History:   Procedure Laterality Date    CARDIAC CATHETERIZATION      COLONOSCOPY  01/08/2018    diverticulosis,[id number]    FOOT SURGERY      KNEE ARTHROSCOPY  7-2006, 1-2014    TOTAL KNEE ARTHROPLASTY Left 2/26/2019    Left Total Knee Arthroplasty and injection of right knee performed by [facility]in [facility]an [facility] at [facility]           Current  Medications:  Current Outpatient Medications   Medication Sig Dispense Refill    Naproxen Sodium (ALEVE) 220 MG CAPS Take 220 mg by mouth as needed for Pain      lisinopril (PRINIVIL;ZESTRIL) 20 MG tablet Take 1 tablet by mouth once daily 90 tablet 3    metoprolol tartrate (LOPRESSOR) 25 MG tablet Take 1 tablet by mouth twice daily 180 tablet 3    sildenafil (VIAGRA) 50 MG tablet Take 1 tablet by mouth as needed for Erectile Dysfunction 30 tablet 3    atorvastatin (LIPITOR) 40 MG tablet Take 1 tablet by mouth nightly 90 tablet 3    sildenafil (REVATIO) 20 MG tablet Take 1 tablet by mouth daily as needed (prn) 5 tablet 3    ibuprofen (ADVIL;MOTRIN) 200 MG tablet Take 200 mg by mouth every 6 hours as needed for Pain       aspirin 81 MG tablet Take 81 mg by mouth daily      Handicap Placard MISC by Does not apply route Valid for 90 days     s/p left total knee arthroplasty 1 each 0    esomeprazole (NEXIUM) 20 MG delayed release capsule Take 20 mg by mouth daily      calcium carbonate 600 MG TABS tablet Take 1 tablet by mouth daily       No current facility-administered medications for this visit. Allergies:  Ciprofloxacin    Social History:   Social History     Tobacco Use   Smoking Status Current Every Day Smoker    Packs/day: 0.50    Years: 30.00    Pack years: 15.00    Types: Cigarettes   Smokeless Tobacco Never Used     Social History     Substance and Sexual Activity   Alcohol Use Yes    Alcohol/week: 12.0 standard drinks    Types: 12 Standard drinks or equivalent per week    Comment: daily 1 beer     Social History     Substance and Sexual Activity   Drug Use No       Family History:  Family History   Problem Relation Age of Onset    Diabetes Mother     Early Death Brother 16        MVA    Early Death Sister 46        blood clot    Heart Disease Other        REVIEW OF SYSTEMS:  Constitutional: Denies any fever, chills. Musculoskeletal: Positive for left knee pain and swelling history of knee replacement done 2 and half years ago. PHYSICAL EXAM:  [unfilled]  General appearance:  Alert and oriented x 3. No apparent distress, appears stated age, calm and cooperative. Musculoskeletal: Left knee was inspected intubates total knee incisions well-healed. He has a moderate joint effusion noted.   There is no erythema increased warmth or cellulitis. Knee motion is full extension flexion is to 110 degrees here today he seems of good overall stability varus valgus stress testing no calf pain is neurovascular intact the foot. Yu Ross DATA:  CBC:   Lab Results   Component Value Date    WBC 15.4 02/27/2019    HGB 13.2 02/27/2019     02/27/2019     BMP:    Lab Results   Component Value Date     03/05/2021    K 4.9 03/05/2021     03/05/2021    CO2 26 03/05/2021    BUN 15 03/05/2021    CREATININE 0.80 03/05/2021    CALCIUM 9.4 03/05/2021    GLUCOSE 109 03/05/2021     PT/INR:    Lab Results   Component Value Date    PROTIME 10.4 02/25/2019    INR 1.0 02/25/2019     Troponin:  No results found for: TROPONINI  No results for input(s): LIPASE, AMYLASE in the last 72 hours. No results for input(s): AST, ALT, BILIDIR, BILITOT, ALKPHOS in the last 72 hours. Uric Acid:  No components found for: URIC  Urine Culture:  No components found for: CURINE    Radiology:   XR KNEE LEFT (3 VIEWS)    Result Date: 7/27/2021  EXAMINATION: THREE XRAY VIEWS OF THE LEFT KNEE 7/27/2021 7:42 am COMPARISON: Left knee radiographs performed 02/26/2019. HISTORY: ORDERING SYSTEM PROVIDED HISTORY: Pain and swelling of left knee TECHNOLOGIST PROVIDED HISTORY: Reason for Exam: Left knee pain, history of multiple falls, knee replacement 2 years ago. Acuity: Chronic Type of Exam: Initial FINDINGS: There is no acute osseous abnormality. There is a total left knee arthroplasty without evidence for complication. There is no joint effusion. The periarticular soft tissues are unremarkable. Left total knee arthroplasty without evidence for complication. Diagnosis Orders   1. Left knee pain, unspecified chronicity           PLAN:  I had a long discussion with Jeannette Cardona here today regarding his ongoing knee pain and swelling. We have aspirated this on 2 different accounts. Most recent aspiration were essentially negative.   I talked to him about the

## 2021-08-19 ENCOUNTER — OFFICE VISIT (OUTPATIENT)
Dept: CARDIOLOGY | Age: 61
End: 2021-08-19
Payer: COMMERCIAL

## 2021-08-19 VITALS
HEART RATE: 60 BPM | BODY MASS INDEX: 33.6 KG/M2 | WEIGHT: 240 LBS | HEIGHT: 71 IN | SYSTOLIC BLOOD PRESSURE: 128 MMHG | DIASTOLIC BLOOD PRESSURE: 65 MMHG

## 2021-08-19 DIAGNOSIS — E78.5 HYPERLIPIDEMIA, UNSPECIFIED HYPERLIPIDEMIA TYPE: ICD-10-CM

## 2021-08-19 DIAGNOSIS — I25.10 CORONARY ARTERY DISEASE INVOLVING NATIVE CORONARY ARTERY OF NATIVE HEART WITHOUT ANGINA PECTORIS: ICD-10-CM

## 2021-08-19 DIAGNOSIS — I10 ESSENTIAL HYPERTENSION: Primary | ICD-10-CM

## 2021-08-19 DIAGNOSIS — Z95.5 H/O HEART ARTERY STENT: ICD-10-CM

## 2021-08-19 PROCEDURE — 93000 ELECTROCARDIOGRAM COMPLETE: CPT | Performed by: INTERNAL MEDICINE

## 2021-08-19 PROCEDURE — 99214 OFFICE O/P EST MOD 30 MIN: CPT | Performed by: INTERNAL MEDICINE

## 2021-08-19 NOTE — PROGRESS NOTES
9/17/20  Yes Cheryl Palmer MD   sildenafil (REVATIO) 20 MG tablet Take 1 tablet by mouth daily as needed (prn) 12/2/19  Yes Rishabh Vazquez MD   ibuprofen (ADVIL;MOTRIN) 200 MG tablet Take 200 mg by mouth every 6 hours as needed for Pain    Yes Historical Provider, MD   aspirin 81 MG tablet Take 81 mg by mouth daily   Yes Historical Provider, MD   Guan[place]ito Farr [address] [facility] by Does not apply route Valid for 90 days     s/p left total knee arthroplasty 3/4/19  Yes Aric Sebastian MD   esomeprazole (NEXIUM) 20 MG delayed release capsule Take 20 mg by mouth daily   Yes Historical Provider, MD   calcium carbonate 600 MG TABS tablet Take 1 tablet by mouth daily   Yes Historical Provider, MD       Allergies:  Ciprofloxacin    Social History:   reports that he has been smoking cigarettes. He has a 15.00 pack-year smoking history. He has never used smokeless tobacco. He reports current alcohol use of about 12.0 standard drinks of alcohol per week. He reports that he does not use drugs. Review of Systems:  · Constitutional: there has been no unanticipated weight loss. There's been No change in energy level, No change in activity level. · Eyes: No visual changes or diplopia. No scleral icterus. · ENT: No Headaches, hearing loss or vertigo. No mouth sores or sore throat. · Cardiovascular: As above. · Respiratory: No SOB, cough or hemoptysis. · Gastrointestinal: No abdominal pain, appetite loss, blood in stools. No change in bowel or bladder habits. · Genitourinary: No dysuria, trouble voiding, or hematuria. · Musculoskeletal:  No gait disturbance, No weakness or joint complaints. · Integumentary: No rash or pruritis. · Psychiatric: No anxiety, or depression. · Hematologic/Lymphatic: No abnormal bruising or bleeding, blood clots or swollen lymph nodes. · Allergic/Immunologic: No nasal congestion or hives.     Physical Exam:  /65   Pulse 60   Ht 5' 11\" (1.803 m)   Wt 240 lb (108.9 kg)   BMI 33.47 collaterals in 2016. Preserved LV systolic function. · NO CHEST PAIN, CONTINUE CURRENT MEDICATIONS       HYPERTENSION- WELL CONTROLLED, WILL CONTINUE CURRENT MEDICATIONS     HYPERLIPIDEMIA- ON STATIN, NEEDS TO WATCH LIPID PROFILE AND LFT'S    Advised to cut down 1 cigarette every 3 days and then discontinue    DISCUSSED IN DETAILS ABOUT RISK MODIFICATION    RETURN VISIT IN 6 MONTHS, IF ANY SYMPTOM CHANGE PATIENT ADVISED TO GO TO THE EMERGENCY ROOM.           Claude Colorado, MD, MD  [facility]           [phone]

## 2021-08-31 ENCOUNTER — TELEPHONE (OUTPATIENT)
Dept: ORTHOPEDIC SURGERY | Age: 61
End: 2021-08-31

## 2021-08-31 DIAGNOSIS — M25.562 PAIN AND SWELLING OF LEFT KNEE: ICD-10-CM

## 2021-08-31 DIAGNOSIS — M25.562 LEFT KNEE PAIN, UNSPECIFIED CHRONICITY: Primary | ICD-10-CM

## 2021-08-31 DIAGNOSIS — M25.462 PAIN AND SWELLING OF LEFT KNEE: ICD-10-CM

## 2021-08-31 NOTE — TELEPHONE ENCOUNTER
Patient was requesting a 3 phase bone scan to be done due to still having aldana. Pipe LEROY reviewed chart and verbal order was given for the 3 phase bone scan. Order was placed. Patient had scheduled 3 phase bone scan on 9-24-21.

## 2021-09-20 RX ORDER — ATORVASTATIN CALCIUM 40 MG/1
TABLET, FILM COATED ORAL
Qty: 90 TABLET | Refills: 3 | Status: SHIPPED | OUTPATIENT
Start: 2021-09-20 | End: 2022-09-29

## 2021-09-24 ENCOUNTER — HOSPITAL ENCOUNTER (OUTPATIENT)
Dept: NUCLEAR MEDICINE | Age: 61
Discharge: HOME | End: 2021-09-26
Payer: COMMERCIAL

## 2021-09-24 DIAGNOSIS — M25.562 LEFT KNEE PAIN, UNSPECIFIED CHRONICITY: ICD-10-CM

## 2021-09-24 DIAGNOSIS — M25.562 PAIN AND SWELLING OF LEFT KNEE: ICD-10-CM

## 2021-09-24 DIAGNOSIS — M25.462 PAIN AND SWELLING OF LEFT KNEE: ICD-10-CM

## 2021-09-24 PROCEDURE — 78315 BONE IMAGING 3 PHASE: CPT

## 2021-09-24 PROCEDURE — A9503 TC99M MEDRONATE: HCPCS | Performed by: PHYSICIAN ASSISTANT

## 2021-09-24 PROCEDURE — 3430000000 HC RX DIAGNOSTIC RADIOPHARMACEUTICAL: Performed by: PHYSICIAN ASSISTANT

## 2021-09-24 RX ORDER — TC 99M MEDRONATE 20 MG/10ML
25 INJECTION, POWDER, LYOPHILIZED, FOR SOLUTION INTRAVENOUS
Status: COMPLETED | OUTPATIENT
Start: 2021-09-24 | End: 2021-09-24

## 2021-09-24 RX ADMIN — TC 99M MEDRONATE 25 MILLICURIE: 20 INJECTION, POWDER, LYOPHILIZED, FOR SOLUTION INTRAVENOUS at 10:50

## 2021-09-28 ENCOUNTER — TELEPHONE (OUTPATIENT)
Dept: ORTHOPEDIC SURGERY | Age: 61
End: 2021-09-28

## 2021-09-28 NOTE — TELEPHONE ENCOUNTER
Dr Marcella Hernández reviewed bone scan results of left knee  He stated that it did not show any definitve signs of infection  If pain persists, he would recommend a stage revision of left knee  Wife is aware of results, and will discuss with patient

## 2021-11-23 ENCOUNTER — HOSPITAL ENCOUNTER (OUTPATIENT)
Age: 61
Setting detail: SPECIMEN
Discharge: HOME | End: 2021-11-23
Payer: COMMERCIAL

## 2021-11-23 ENCOUNTER — OFFICE VISIT (OUTPATIENT)
Dept: ORTHOPEDIC SURGERY | Age: 61
End: 2021-11-23
Payer: COMMERCIAL

## 2021-11-23 VITALS
WEIGHT: 240 LBS | DIASTOLIC BLOOD PRESSURE: 82 MMHG | SYSTOLIC BLOOD PRESSURE: 126 MMHG | HEIGHT: 71 IN | BODY MASS INDEX: 33.6 KG/M2

## 2021-11-23 DIAGNOSIS — M25.562 CHRONIC PAIN OF LEFT KNEE: ICD-10-CM

## 2021-11-23 DIAGNOSIS — G89.29 CHRONIC PAIN OF LEFT KNEE: ICD-10-CM

## 2021-11-23 DIAGNOSIS — M25.561 RIGHT KNEE PAIN, UNSPECIFIED CHRONICITY: Primary | ICD-10-CM

## 2021-11-23 DIAGNOSIS — M25.561 RIGHT KNEE PAIN, UNSPECIFIED CHRONICITY: ICD-10-CM

## 2021-11-23 PROCEDURE — 99212 OFFICE O/P EST SF 10 MIN: CPT | Performed by: PHYSICIAN ASSISTANT

## 2021-11-23 PROCEDURE — 87070 CULTURE OTHR SPECIMN AEROBIC: CPT

## 2021-11-23 PROCEDURE — 87205 SMEAR GRAM STAIN: CPT

## 2021-11-23 PROCEDURE — 20610 DRAIN/INJ JOINT/BURSA W/O US: CPT | Performed by: PHYSICIAN ASSISTANT

## 2021-11-23 PROCEDURE — 87075 CULTR BACTERIA EXCEPT BLOOD: CPT

## 2021-11-23 PROCEDURE — 89051 BODY FLUID CELL COUNT: CPT

## 2021-11-23 RX ORDER — BUPIVACAINE HYDROCHLORIDE 5 MG/ML
5 INJECTION, SOLUTION PERINEURAL ONCE
Status: COMPLETED | OUTPATIENT
Start: 2021-11-23 | End: 2021-11-23

## 2021-11-23 RX ORDER — TRIAMCINOLONE ACETONIDE 40 MG/ML
80 INJECTION, SUSPENSION INTRA-ARTICULAR; INTRAMUSCULAR ONCE
Status: COMPLETED | OUTPATIENT
Start: 2021-11-23 | End: 2021-11-23

## 2021-11-23 RX ORDER — AMOXICILLIN 500 MG/1
CAPSULE ORAL
COMMUNITY
Start: 2021-11-16 | End: 2022-02-01

## 2021-11-23 RX ADMIN — TRIAMCINOLONE ACETONIDE 80 MG: 40 INJECTION, SUSPENSION INTRA-ARTICULAR; INTRAMUSCULAR at 09:13

## 2021-11-23 RX ADMIN — BUPIVACAINE HYDROCHLORIDE 25 MG: 5 INJECTION, SOLUTION PERINEURAL at 09:13

## 2021-11-23 NOTE — PROGRESS NOTES
by Madi Haines MD at [address] [place] [address]           Current  Medications:  Current Outpatient Medications   Medication Sig Dispense Refill    amoxicillin (AMOXIL) 500 MG capsule       atorvastatin (LIPITOR) 40 MG tablet Take 1 tablet by mouth nightly 90 tablet 3    Naproxen Sodium (ALEVE) 220 MG CAPS Take 220 mg by mouth as needed for Pain      lisinopril (PRINIVIL;ZESTRIL) 20 MG tablet Take 1 tablet by mouth once daily 90 tablet 3    metoprolol tartrate (LOPRESSOR) 25 MG tablet Take 1 tablet by mouth twice daily 180 tablet 3    sildenafil (VIAGRA) 50 MG tablet Take 1 tablet by mouth as needed for Erectile Dysfunction 30 tablet 3    sildenafil (REVATIO) 20 MG tablet Take 1 tablet by mouth daily as needed (prn) 5 tablet 3    ibuprofen (ADVIL;MOTRIN) 200 MG tablet Take 200 mg by mouth every 6 hours as needed for Pain       aspirin 81 MG tablet Take 81 mg by mouth daily      Handicap Placard MISC by Does not apply route Valid for 90 days     s/p left total knee arthroplasty 1 each 0    esomeprazole (NEXIUM) 20 MG delayed release capsule Take 20 mg by mouth daily      calcium carbonate 600 MG TABS tablet Take 1 tablet by mouth daily       No current facility-administered medications for this visit.        Allergies:  Ciprofloxacin    Social History:   Social History     Tobacco Use   Smoking Status Current Every Day Smoker    Packs/day: 0.50    Years: 30.00    Pack years: 15.00    Types: Cigarettes   Smokeless Tobacco Never Used     Social History     Substance and Sexual Activity   Alcohol Use Yes    Alcohol/week: 12.0 standard drinks    Types: 12 Standard drinks or equivalent per week    Comment: daily 1 beer     Social History     Substance and Sexual Activity   Drug Use No       Family History:  Family History   Problem Relation Age of Onset    Diabetes Mother     Early Death Brother 16        MVA    Early Death Sister 46        blood clot    Heart Disease Other REVIEW OF SYSTEMS:  Constitutional: Denies any fever, chills. Musculoskeletal: Positive for bilateral knee pain. Left total knee replacement couple years ago. PHYSICAL EXAM:  [unfilled]  General appearance:  Alert and oriented x 3. No apparent distress, appears stated age, calm and cooperative. Musculoskeletal: Left knee was first inspected skin is good repair there is no erythema no increased warmth no cellulitis. Does have a moderate joint effusion noted. Knee motion full extension flexion is to 115 degrees. He is got diffuse tenderness palpation about the knee. Right knee was then inspected. Skin is good repair. There is no erythema no increased warmth no cellulitis. No real joint effusion noted. He has medial joint line pain to palpation. Knee motion full extension flexion to 115 degrees. Good stability no calf pain he is neurovascular motor intact to the right foot. Emmett Waller DATA:  CBC:   Lab Results   Component Value Date    WBC 15.4 02/27/2019    HGB 13.2 02/27/2019     02/27/2019     BMP:    Lab Results   Component Value Date     03/05/2021    K 4.9 03/05/2021     03/05/2021    CO2 26 03/05/2021    BUN 15 03/05/2021    CREATININE 0.80 03/05/2021    CALCIUM 9.4 03/05/2021    GLUCOSE 109 03/05/2021     PT/INR:    Lab Results   Component Value Date    PROTIME 10.4 02/25/2019    INR 1.0 02/25/2019     Troponin:  No results found for: TROPONINI  No results for input(s): LIPASE, AMYLASE in the last 72 hours. No results for input(s): AST, ALT, BILIDIR, BILITOT, ALKPHOS in the last 72 hours. Uric Acid:  No components found for: URIC  Urine Culture:  No components found for: CURINE    Radiology:   No results found. X-rays personally reviewed by me of none obtained today       Diagnosis Orders   1. Right knee pain, unspecified chronicity  Culture, Body Fluid    Body Fluid Cell Count w/ Differential    NM INFLAMMATORY WBC WHOLE BODY W INDIUM 111   2.  Chronic pain of left knee           PLAN:  I long discussion with Sana Ca and his wife at bedside here today. I do feel as though he benefit from a repeat aspiration that left knee. This was performed. See procedure note for rest of this procedure. He does inform me today that he is on antibiotics for tooth infection recently. I did tell him this could skew the results of the study he still wishes to proceed. He would also like to proceed with a corticosteroid injection to his right knee. He did have questions regarding joint replacement surgery on the right side. I asked that he reconsider based on the fact that he is not doing extremely well with his left total knee replacement. He did express understanding. He would like to consider a second opinion. We did recommend getting a indium tagged white blood cell scan prior to that opinion. He did agree. No orders of the defined types were placed in this encounter. Procedure:    Utilizing sterile technique the superior lateral portal left knee was washed with alcohol pad. An 18-gauge needle was used to aspirate 35 cc of straw-colored fluid. This well-tolerated good hemostasis no dry sterile bandage applied    Utilizing sterile technique the anterolateral portal of the right knee was washed with alcohol pad. A 22-gauge needle was used to introduce 5 cc of half percent Marcaine and 80 mg of Kenalog into the right knee joint. This well-tolerated. Good hemostasis noted. Dry sterile bandage applied. Were going to send this fluid from the left knee aspiration off for Gram stain cell count aerobic and anaerobic cultures and sensitivities. We will get him set up for that indium tagged white blood cell scan.     Electronically signed by Carmen Yepez PA-C on 11/23/2021 at 8:58 AM

## 2021-11-24 LAB
APPEARANCE FLUID: ABNORMAL
BASO FLUID: ABNORMAL %
COLOR FLUID: ABNORMAL
EOSINOPHIL FLUID: ABNORMAL %
FLUID DIFF COMMENT: ABNORMAL
LYMPHOCYTES, BODY FLUID: 15 %
MONOCYTE, FLUID: ABNORMAL %
NEUTROPHIL, FLUID: 2 %
OTHER CELLS FLUID: ABNORMAL %
RBC FLUID: <3000 /MM3
SPECIMEN TYPE: ABNORMAL
WBC FLUID: 1271 /MM3

## 2021-11-29 LAB
CULTURE: ABNORMAL
DIRECT EXAM: ABNORMAL
Lab: ABNORMAL
SPECIMEN DESCRIPTION: ABNORMAL

## 2021-12-14 ENCOUNTER — TELEPHONE (OUTPATIENT)
Dept: ORTHOPEDIC SURGERY | Age: 61
End: 2021-12-14

## 2021-12-14 NOTE — TELEPHONE ENCOUNTER
Dr Bruce Larry reviewed results of Indium scan and knee cultures  Both results were read as negative  Dr Bruce Larry recommended a second opinion if still having pain  Patients wife stated that they have a second opinion scheduled

## 2022-01-31 DIAGNOSIS — M25.561 RIGHT KNEE PAIN, UNSPECIFIED CHRONICITY: Primary | ICD-10-CM

## 2022-02-01 ENCOUNTER — OFFICE VISIT (OUTPATIENT)
Dept: ORTHOPEDIC SURGERY | Age: 62
End: 2022-02-01
Payer: COMMERCIAL

## 2022-02-01 ENCOUNTER — HOSPITAL ENCOUNTER (OUTPATIENT)
Dept: GENERAL RADIOLOGY | Age: 62
Discharge: HOME | End: 2022-02-03
Payer: COMMERCIAL

## 2022-02-01 VITALS
DIASTOLIC BLOOD PRESSURE: 68 MMHG | SYSTOLIC BLOOD PRESSURE: 140 MMHG | HEART RATE: 75 BPM | BODY MASS INDEX: 33.6 KG/M2 | HEIGHT: 71 IN | WEIGHT: 240 LBS

## 2022-02-01 DIAGNOSIS — M17.11 PRIMARY OSTEOARTHRITIS OF RIGHT KNEE: Primary | ICD-10-CM

## 2022-02-01 DIAGNOSIS — M25.561 RIGHT KNEE PAIN, UNSPECIFIED CHRONICITY: ICD-10-CM

## 2022-02-01 PROCEDURE — 99213 OFFICE O/P EST LOW 20 MIN: CPT | Performed by: PHYSICIAN ASSISTANT

## 2022-02-01 PROCEDURE — 73562 X-RAY EXAM OF KNEE 3: CPT

## 2022-02-01 NOTE — H&P
History and Physical        CHIEF COMPLAINT: Right knee pain    HISTORY OF PRESENT ILLNESS:      The patient is a 63 y.o. male  who presents with a long history of right knee pain. He has had x-rays in the past and diagnosed with primary osteoarthritis of the right knee. He has had multiple corticosteroid injection in the right knee in the past.  He states last 1 was at least 3 months ago. He currently takes naproxen on a regular basis. Pain in the right knee remains sharp localized anterior medial aspect knee worse with weightbearing worse with activity relieved with rest.  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 After examining the patient and reviewing the x-rays we feel as though he best treated with right total knee replacement. Benefits versus risks were explained consent was obtained we will proceed as outlined. Past Medical History:    Past Medical History:   Diagnosis Date    Bunion     right    CAD (coronary artery disease)     Diverticul disease small and large intestine, no perforati or abscess     GERD (gastroesophageal reflux disease)     Hammer toe     right    Heart attack ([facility] [address] 11/09/2009    Hyperlipidemia     Hypertension     Pericarditis     Positive cardiac stress test     Smoking history        Past Surgical History:    Past Surgical History:   Procedure Laterality Date    CARDIAC CATHETERIZATION      COLONOSCOPY  01/08/2018    diverticulosis,[id number]    FOOT SURGERY      KNEE ARTHROSCOPY  7-2006, 1-2014    TOTAL KNEE ARTHROPLASTY Left 2/26/2019    Left Total Knee Arthroplasty and injection of right knee performed by Abigail Sanchez MD at [facility]         Medications Prior to Admission:   Prior to Admission medications    Medication Sig Start Date End Date Taking?  Authorizing Provider   atorvastatin (LIPITOR) 40 MG tablet Take 1 tablet by mouth nightly 9/20/21 Yes Robert Thomas DO   Naproxen Sodium (ALEVE) 220 MG CAPS Take 220 mg by mouth as needed for Pain   Yes Historical Provider, MD   lisinopril (PRINIVIL;ZESTRIL) 20 MG tablet Take 1 tablet by mouth once daily 6/24/21  Yes Trevre Tran MD   metoprolol tartrate (LOPRESSOR) 25 MG tablet Take 1 tablet by mouth twice daily 3/29/21  Yes Aleta Friedman MD   ibuprofen (ADVIL;MOTRIN) 200 MG tablet Take 200 mg by mouth every 6 hours as needed for Pain    Yes Historical Provider, MD   aspirin 81 MG tablet Take 81 mg by mouth daily   Yes Historical Provider, MD   Handicap Placard MISC by Does not apply route Valid for 90 days     s/p left total knee arthroplasty 3/4/19  Yes Matt Hilliard MD   esomeprazole (NEXIUM) 20 MG delayed release capsule Take 20 mg by mouth daily   Yes Historical Provider, MD   calcium carbonate 600 MG TABS tablet Take 1 tablet by mouth daily   Yes Historical Provider, MD   sildenafil (VIAGRA) 50 MG tablet Take 1 tablet by mouth as needed for Erectile Dysfunction  Patient not taking: Reported on 2/1/2022 11/25/20   Trever Tran MD   sildenafil (REVATIO) 20 MG tablet Take 1 tablet by mouth daily as needed (prn)  Patient not taking: Reported on 2/1/2022 12/2/19   Trever Tran MD    Scheduled Meds:  Continuous Infusions:  PRN Meds:. Allergies:  Ciprofloxacin    Social History:   Social History     Socioeconomic History    Marital status:      Spouse name: None    Number of children: None    Years of education: None    Highest education level: None   Occupational History    None   Tobacco Use    Smoking status: Current Every Day Smoker     Packs/day: 0.50     Years: 30.00     Pack years: 15.00     Types: Cigarettes    Smokeless tobacco: Never Used   Vaping Use    Vaping Use: Never used   Substance and Sexual Activity    Alcohol use:  Yes     Alcohol/week: 12.0 standard drinks     Types: 12 Standard drinks or equivalent per week     Comment: daily 1 beer    Drug use: No    Sexual activity: None   Other Topics Concern    None   Social History Narrative    ** Merged History Encounter **          Social Determinants of Health     Financial Resource Strain:     Difficulty of Paying Living Expenses: Not on file   Food Insecurity:     Worried About Running Out of Food in the Last Year: Not on file    Ирина of Food in the Last Year: Not on file   Transportation Needs:     Lack of Transportation (Medical): Not on file    Lack of Transportation (Non-Medical):  Not on file   Physical Activity:     Days of Exercise per Week: Not on file    Minutes of Exercise per Session: Not on file   Stress:     Feeling of Stress : Not on file   Social Connections:     Frequency of Communication with Friends and Family: Not on file    Frequency of Social Gatherings with Friends and Family: Not on file    Attends Jehovah's witness Services: Not on file    Active Member of [address] [place] [address] [employer] or Organizations: Not on file    Attends Club or Organization Meetings: Not on file    Marital Status: Not on file   Intimate Partner Violence:     Fear of Current or Ex-Partner: Not on file    Emotionally Abused: Not on file    Physically Abused: Not on file    Sexually Abused: Not on file   Housing Stability:     Unable to Pay for Housing in the Last Year: Not on file    Number of [place] in the Last Year: Not on file    Unstable Housing in the Last Year: Not on file     Social History     Tobacco Use   Smoking Status Current Every Day Smoker    Packs/day: 0.50    Years: 30.00    Pack years: 15.00    Types: Cigarettes   Smokeless Tobacco Never Used     Social History     Substance and Sexual Activity   Alcohol Use Yes    Alcohol/week: 12.0 standard drinks    Types: 12 Standard drinks or equivalent per week    Comment: daily 1 beer     Social History     Substance and Sexual Activity   Drug Use No       Family History:  Family History   Problem Relation Age of Onset    Diabetes Mother     Early Death Brother 16        MVA    Early Death Sister 46        blood clot    Heart Disease Other        REVIEW OF SYSTEMS:  Constitutional: Denies any fever, chills. Derm: Denies any rash or skin color change. Eyes: Denies blurred or decreased in vision. Ent: Denies any tinnitus or vertigo. Resp: Denies any cough or shortness of breath. CV: Denies any syncope, palpitations or chest pain. GI:  Denies any abdominal pain, nausea, vomiting, constipation or diarrhea. : Denies any hematuria, hesitancy, or dysuria. Heme/Lymph: Denies any bleeding. Musculoskeletal: Positive for left total knee replacement in the past as well as right knee osteoarthritis. Neuro: Denies any dizziness, paresthesia or weakness. PHYSICAL EXAM:  [unfilled]  General appearance:  Alert and oriented x 3. No apparent distress, appears stated age and cooperative. HEENT:  Normal cephalic, atraumatic without obvious deformity. Pupils equal, round, and reactive to light. Conjunctivae/corneas clear. Neck: Supple, with full range of motion. Trachea midline. Respiratory:  Normal respiratory effort. No audible Wheezes or Rhonchi. Cardiovascular:  Regular rate and rhythm. Abdomen: Soft, non-tender, non-distended. Musculoskeletal: Right knee was inspected skin is good repair no erythema no increased warmth no cellulitis small joint effusion noted. Knee motions full extension flexion is to 110 degrees. Significant crepitus noted throughout range of motion. He has good overall stability varus and valgus anterior posterior stresses. He has no calf pain. He is neurovascular intact the foot. Skin: Skin color, texture, turgor normal.  No rashes or lesions. Neurologic:  Neurovascularly intact without any focal sensory/motor deficits. Sensation intact.        DATA:  CBC:   Lab Results   Component Value Date    WBC 15.4 02/27/2019    HGB 13.2 02/27/2019     02/27/2019     BMP:    Lab Results   Component Value Date     03/05/2021    K 4.9 03/05/2021     03/05/2021    CO2 26 03/05/2021    BUN 15 03/05/2021    CREATININE 0.80 03/05/2021    CALCIUM 9.4 03/05/2021    GLUCOSE 109 03/05/2021     PT/INR:    Lab Results   Component Value Date    PROTIME 10.4 02/25/2019    INR 1.0 02/25/2019     Troponin:  No results found for: TROPONINI  No results for input(s): LIPASE, AMYLASE in the last 72 hours. No results for input(s): AST, ALT, BILIDIR, BILITOT, ALKPHOS in the last 72 hours. Radiology:  XR KNEE RIGHT (3 VIEWS)    Result Date: 2/1/2022  EXAMINATION: THREE XRAY VIEWS OF THE RIGHT KNEE 2/1/2022 7:37 am COMPARISON: 11/03/2020 HISTORY: ORDERING SYSTEM PROVIDED HISTORY: Right knee pain, unspecified chronicity TECHNOLOGIST PROVIDED HISTORY: Reason for Exam: Chronic right knee pain FINDINGS: No acute fracture or dislocation. Mild narrowing of the medial femorotibial compartment with tricompartmental marginal osteophytes. Diffuse osteopenia. No effusion. Mild medial femorotibial osteoarthritis, similar to prior. ASSESSMENT:Active Problems:    * No active hospital problems. *  Resolved Problems:    * No resolved hospital problems. *  Primary osteoarthritis right knee    PLAN as discussed with Dr. Ursula Lozoya:  1. We do feel as though Galindo Viera would benefit from a right total knee replacement. We had a long discussion with him today regarding this. We will need to get him medically cleared on the schedule. The patient was counseled at length about the risks of bridgette Covid-19 during their perioperative period and any recovery window from their procedure. The patient was made aware that bridgette Covid-19  may worsen their prognosis for recovering from their procedure  and lend to a higher morbidity and/or mortality risk. All material risks, benefits, and reasonable alternatives including postponing the procedure were discussed. The patient does wish to proceed with the procedure at this time.          Electronically signed by [place] TRACIE Ballard on 2/1/2022 at 8:44 AM

## 2022-02-07 ENCOUNTER — OFFICE VISIT (OUTPATIENT)
Dept: INTERNAL MEDICINE | Age: 62
End: 2022-02-07
Payer: COMMERCIAL

## 2022-02-07 ENCOUNTER — HOSPITAL ENCOUNTER (OUTPATIENT)
Dept: LAB | Age: 62
Discharge: HOME | End: 2022-02-07
Payer: COMMERCIAL

## 2022-02-07 VITALS
BODY MASS INDEX: 32.9 KG/M2 | DIASTOLIC BLOOD PRESSURE: 88 MMHG | HEIGHT: 71 IN | SYSTOLIC BLOOD PRESSURE: 136 MMHG | HEART RATE: 72 BPM | WEIGHT: 235 LBS | RESPIRATION RATE: 16 BRPM

## 2022-02-07 DIAGNOSIS — I10 PRIMARY HYPERTENSION: ICD-10-CM

## 2022-02-07 DIAGNOSIS — G89.29 CHRONIC PAIN OF RIGHT KNEE: Primary | ICD-10-CM

## 2022-02-07 DIAGNOSIS — M25.561 CHRONIC PAIN OF RIGHT KNEE: Primary | ICD-10-CM

## 2022-02-07 DIAGNOSIS — D50.9 IRON DEFICIENCY ANEMIA, UNSPECIFIED IRON DEFICIENCY ANEMIA TYPE: ICD-10-CM

## 2022-02-07 DIAGNOSIS — I25.10 CORONARY ARTERY DISEASE INVOLVING NATIVE CORONARY ARTERY OF NATIVE HEART WITHOUT ANGINA PECTORIS: ICD-10-CM

## 2022-02-07 LAB
ABSOLUTE EOS #: 0.18 K/UL (ref 0–0.44)
ABSOLUTE IMMATURE GRANULOCYTE: 0.04 K/UL (ref 0–0.3)
ABSOLUTE LYMPH #: 2.59 K/UL (ref 1.1–3.7)
ABSOLUTE MONO #: 1.01 K/UL (ref 0.1–1.2)
BASOPHILS # BLD: 1 % (ref 0–2)
BASOPHILS ABSOLUTE: 0.06 K/UL (ref 0–0.2)
DIFFERENTIAL TYPE: NORMAL
EOSINOPHILS RELATIVE PERCENT: 2 % (ref 1–4)
HCT VFR BLD CALC: 45.3 % (ref 40.7–50.3)
HEMOGLOBIN: 15.1 G/DL (ref 13–17)
IMMATURE GRANULOCYTES: 0 %
LYMPHOCYTES # BLD: 25 % (ref 24–43)
MCH RBC QN AUTO: 30.5 PG (ref 25.2–33.5)
MCHC RBC AUTO-ENTMCNC: 33.3 G/DL (ref 25.2–33.5)
MCV RBC AUTO: 91.5 FL (ref 82.6–102.9)
MONOCYTES # BLD: 10 % (ref 3–12)
NRBC AUTOMATED: 0 PER 100 WBC
PDW BLD-RTO: 13.1 % (ref 11.8–14.4)
PLATELET # BLD: 249 K/UL (ref 138–453)
PLATELET ESTIMATE: NORMAL
PMV BLD AUTO: 10.3 FL (ref 8.1–13.5)
RBC # BLD: 4.95 M/UL (ref 4.21–5.77)
RBC # BLD: NORMAL 10*6/UL
SEG NEUTROPHILS: 62 % (ref 36–65)
SEGMENTED NEUTROPHILS ABSOLUTE COUNT: 6.46 K/UL (ref 1.5–8.1)
WBC # BLD: 10.3 K/UL (ref 3.5–11.3)
WBC # BLD: NORMAL 10*3/UL

## 2022-02-07 PROCEDURE — 85025 COMPLETE CBC W/AUTO DIFF WBC: CPT

## 2022-02-07 PROCEDURE — 99214 OFFICE O/P EST MOD 30 MIN: CPT | Performed by: INTERNAL MEDICINE

## 2022-02-07 PROCEDURE — 36415 COLL VENOUS BLD VENIPUNCTURE: CPT

## 2022-02-07 SDOH — ECONOMIC STABILITY: FOOD INSECURITY: WITHIN THE PAST 12 MONTHS, THE FOOD YOU BOUGHT JUST DIDN'T LAST AND YOU DIDN'T HAVE MONEY TO GET MORE.: NEVER TRUE

## 2022-02-07 SDOH — ECONOMIC STABILITY: FOOD INSECURITY: WITHIN THE PAST 12 MONTHS, YOU WORRIED THAT YOUR FOOD WOULD RUN OUT BEFORE YOU GOT MONEY TO BUY MORE.: NEVER TRUE

## 2022-02-07 ASSESSMENT — ENCOUNTER SYMPTOMS
COUGH: 0
CONSTIPATION: 0
BLOOD IN STOOL: 0
SHORTNESS OF BREATH: 0
DIARRHEA: 0
ABDOMINAL PAIN: 0
VOMITING: 0
EYE PAIN: 0
NAUSEA: 0
BACK PAIN: 0

## 2022-02-07 ASSESSMENT — SOCIAL DETERMINANTS OF HEALTH (SDOH): HOW HARD IS IT FOR YOU TO PAY FOR THE VERY BASICS LIKE FOOD, HOUSING, MEDICAL CARE, AND HEATING?: NOT HARD AT ALL

## 2022-02-07 NOTE — PROGRESS NOTES
[place] [address]  Dept: [phone]  Dept Fax: [phone]  Loc: [phone]     Wendy Plasencia is a 63 y.o. male who presents today for his medical conditions/complaintsas noted below. Wendy Plasencia is c/o of   Chief Complaint   Patient presents with    Pre-op Exam    Hypertension    Coronary Artery Disease    Knee Pain     chronic right         HPI:     Hypertension  This is a chronic problem. The current episode started more than 1 year ago. The problem has been waxing and waning since onset. The problem is controlled. Pertinent negatives include no chest pain, headaches, neck pain, palpitations or shortness of breath. Coronary Artery Disease  Presents for follow-up visit. Pertinent negatives include no chest pain, chest pressure, dizziness, leg swelling, palpitations or shortness of breath. The symptoms have been stable. Compliance with diet is good. Compliance with exercise is good. Compliance with medications is good. Knee Pain   The incident occurred more than 1 week ago. The incident occurred at home. There was no injury mechanism. The pain is present in the right knee. The pain is mild. The pain has been fluctuating since onset. Pertinent negatives include no numbness.        Hemoglobin A1C (%)   Date Value   03/05/2021 5.7   08/10/2020 5.7   12/05/2019 5.8            No results found for: LABMICR  LDL Cholesterol (mg/dL)   Date Value   03/05/2021 47   08/10/2020 49   12/05/2019 56         AST (U/L)   Date Value   03/05/2021 20     ALT (U/L)   Date Value   03/05/2021 17     BUN (mg/dL)   Date Value   03/05/2021 15     BP Readings from Last 3 Encounters:   02/07/22 136/88   02/01/22 (!) 140/68   11/23/21 126/82              Past Medical History:   Diagnosis Date    Bunion     right    CAD (coronary artery disease)     Diverticul disease small and large intestine, no perforati or calcium carbonate 600 MG TABS tablet Take 1 tablet by mouth daily       No current facility-administered medications for this visit. Allergies   Allergen Reactions    Ciprofloxacin Itching and Rash       Health Maintenance   Topic Date Due    Hepatitis C screen  Never done    Pneumococcal 0-64 years Vaccine (1 of 2 - PPSV23) Never done    HIV screen  Never done    Shingles Vaccine (1 of 2) Never done    Flu vaccine (1) Never done    A1C test (Diabetic or Prediabetic)  03/05/2022    Lipid screen  03/05/2022    Potassium monitoring  03/05/2022    Creatinine monitoring  03/05/2022    Depression Screen  03/12/2022    Colon cancer screen colonoscopy  01/08/2023    DTaP/Tdap/Td vaccine (2 - Td or Tdap) 09/23/2024    COVID-19 Vaccine  Completed    Hepatitis A vaccine  Aged Out    Hepatitis B vaccine  Aged Out    Hib vaccine  Aged Out    Meningococcal (ACWY) vaccine  Aged Out       Subjective:      Review of Systems   Constitutional: Negative for chills and fever. HENT: Negative for hearing loss. Eyes: Negative for pain and visual disturbance. Respiratory: Negative for cough and shortness of breath. Cardiovascular: Negative for chest pain, palpitations and leg swelling. Gastrointestinal: Negative for abdominal pain, blood in stool, constipation, diarrhea, nausea and vomiting. Endocrine: Negative for cold intolerance, polydipsia and polyuria. Genitourinary: Negative for difficulty urinating, dysuria and hematuria. Musculoskeletal: Negative for arthralgias, back pain, gait problem and neck pain. Skin: Negative for pallor and rash. Neurological: Negative for dizziness, weakness, numbness and headaches. Hematological: Negative for adenopathy. Does not bruise/bleed easily. Psychiatric/Behavioral: Negative for confusion. The patient is not nervous/anxious. Objective:     Physical Exam  Vitals reviewed. Constitutional:       Appearance: He is well-developed.    HENT: Head: Normocephalic and atraumatic. Eyes:      Pupils: Pupils are equal, round, and reactive to light. Cardiovascular:      Rate and Rhythm: Normal rate and regular rhythm. Heart sounds: No murmur heard. No friction rub. No gallop. Pulmonary:      Effort: Pulmonary effort is normal.      Breath sounds: Normal breath sounds. No wheezing or rales. Abdominal:      General: There is no distension. Palpations: Abdomen is soft. There is no mass. Tenderness: There is no abdominal tenderness. There is no rebound. Musculoskeletal:         General: Normal range of motion. Cervical back: Neck supple. Lymphadenopathy:      Cervical: No cervical adenopathy. Skin:     General: Skin is warm and dry. Findings: No rash. Neurological:      Mental Status: He is alert and oriented to person, place, and time. Cranial Nerves: No cranial nerve deficit (grossly). Psychiatric:         Thought Content: Thought content normal.        /88 (Site: Left Upper Arm, Position: Sitting, Cuff Size: Large Adult)   Pulse 72   Resp 16   Ht 5' 11\" (1.803 m)   Wt 235 lb (106.6 kg)   BMI 32.78 kg/m²     Assessment:       Diagnosis Orders   1. Chronic pain of right knee     2. Primary hypertension     3. Coronary artery disease involving native coronary artery of native heart without angina pectoris     4. Iron deficiency anemia, unspecified iron deficiency anemia type  CBC With Auto Differential    Hemoglobin    Iron And TIBC   I reviewed multiple test results for this appointment. 11/23/2021 left knee joint aspiration showed no bacteria and culture was negative. 12/7/2021 nuclear tagged WBC exam showed no abnormal activity in either knee. 1/26/2022 normal sed rate equal 18. Patient told to continue Lipitor. Patient has a cardiology appointment on 2/10/2022 for cardiac risk assessment for preop cardiac clearance.     Otherwise patient is medically cleared for the upcoming knee surgery with good control of his blood pressure etc.    Patient told to hold aspirin and NSAIDs x7 days before knee surgery. Patient also encouraged to taper and quit smoking. Plan:       Return in about 13 weeks (around 5/9/2022) for Annual Physical, Hypertension, CAD. Orders Placed This Encounter   Procedures    CBC With Auto Differential     Standing Status:   Future     Standing Expiration Date:   2/7/2023    Hemoglobin     Standing Status:   Future     Standing Expiration Date:   2/7/2023    Iron And TIBC     Standing Status:   Future     Standing Expiration Date:   2/7/2023     Order Specific Question:   Is Patient Fasting? Answer:   na     Order Specific Question:   No of Hours? Answer:   na     No orders of the defined types were placed in this encounter. Patientgiven educational materials - see patient instructions. Discussed use, benefit,and side effects of prescribed medications. All patient questions answered. Ptvoiced understanding. Reviewed health maintenance. Instructed to continue currentmedications, diet and exercise. Patient agreed with treatment plan. Follow up asdirected.      Electronically signed by Madelin Beth MD on 2/7/2022 at 9:54 AM

## 2022-02-10 ENCOUNTER — OFFICE VISIT (OUTPATIENT)
Dept: CARDIOLOGY | Age: 62
End: 2022-02-10
Payer: COMMERCIAL

## 2022-02-10 VITALS
DIASTOLIC BLOOD PRESSURE: 86 MMHG | BODY MASS INDEX: 32.9 KG/M2 | HEART RATE: 85 BPM | WEIGHT: 235 LBS | SYSTOLIC BLOOD PRESSURE: 146 MMHG | HEIGHT: 71 IN

## 2022-02-10 DIAGNOSIS — Z01.818 PRE-OP EXAMINATION: ICD-10-CM

## 2022-02-10 DIAGNOSIS — I10 ESSENTIAL HYPERTENSION: Primary | ICD-10-CM

## 2022-02-10 PROCEDURE — 93000 ELECTROCARDIOGRAM COMPLETE: CPT | Performed by: INTERNAL MEDICINE

## 2022-02-10 PROCEDURE — 99214 OFFICE O/P EST MOD 30 MIN: CPT | Performed by: INTERNAL MEDICINE

## 2022-02-10 NOTE — PROGRESS NOTES
Today's Date: 2/10/2022  Patient Name: Zohreh Arreaga  Patient's age: 63 y.o., 1960          CC: the patient is a 63 y.o.  male is in the office for routine follow-up. Fairly stable from cardiac standpoint and denies having any episodes of chest pain or discomfort  Dyspnea on exertion has significantly improved but persist  Apparently he had COVID-19 infection in January 2021, had only mild symptoms but reports increased shortness of breath on exertion since then. No orthopnea, lower extremity edema or PND  No significant decline in functional  Scheduled to undergo right knee arthroplasty      Past Medical History:   has a past medical history of Bunion, CAD (coronary artery disease), Diverticul disease small and large intestine, no perforati or abscess, GERD (gastroesophageal reflux disease), Hammer toe, Heart attack ([address] Hyperlipidemia, Hypertension, Pericarditis, Positive cardiac stress test, and Smoking history. Past Surgical History:   has a past surgical history that includes Upper gastrointestinal endoscopy; Knee arthroscopy (7-2006, 1-2014); Foot surgery; Cardiac catheterization; Colonoscopy (01/08/2018); and Total knee arthroplasty (Left, 2/26/2019). Home Medications:    Prior to Admission medications    Medication Sig Start Date End Date Taking?  Authorizing Provider   atorvastatin (LIPITOR) 40 MG tablet Take 1 tablet by mouth nightly 9/20/21   Robert Thomas DO   Naproxen Sodium (ALEVE) 220 MG CAPS Take 220 mg by mouth as needed for Pain    Historical Provider, MD   lisinopril (PRINIVIL;ZESTRIL) 20 MG tablet Take 1 tablet by mouth once daily 6/24/21   Kendrick Casiano MD   metoprolol tartrate (LOPRESSOR) 25 MG tablet Take 1 tablet by mouth twice daily 3/29/21   Tadeo Hussein MD   ibuprofen (ADVIL;MOTRIN) 200 MG tablet Take 200 mg by mouth every 6 hours as needed for Pain     Historical Provider, MD   aspirin 81 MG tablet Take 81 mg by mouth daily Historical Provider, MD   Handicap Placard MISC by Does not apply route Valid for 90 days     s/p left total knee arthroplasty 3/4/19   Alirio Wilson MD   esomeprazole (NEXIUM) 20 MG delayed release capsule Take 20 mg by mouth daily    Historical Provider, MD   calcium carbonate 600 MG TABS tablet Take 1 tablet by mouth daily    Historical Provider, MD       Allergies:  Ciprofloxacin    Social History:   reports that he has been smoking cigarettes. He has a 15.00 pack-year smoking history. He has never used smokeless tobacco. He reports current alcohol use of about 12.0 standard drinks of alcohol per week. He reports that he does not use drugs. Family History:    Family History   Problem Relation Age of Onset    Diabetes Mother     Early Death Brother 16        MVA    Early Death Sister 46        blood clot    Heart Disease Other        REVIEW OF SYSTEMS:  CONSTITUTIONAL:NEGATIVE  HEENT:NEG  Cardiovascular: No chest pain, Yes dyspnea on exertion (at baseline), No palpitations. Lower extremity edema: Yes  RESPIRATORY: GEORGES  GASTROINTESTINAL:  negative  GENITOURINARY:  negative  INTEGUMENT:  negative  MUSCULOSKELETAL:  positive for  pain  NEUROLOGICAL:  negative    PHYSICAL EXAM:      There were no vitals taken for this visit. HEENT: PERRL, no cervical lymphadenopathy. No masses palpable. Cardiovascular:  · The apical impulse is not displaced  · Heart  Sounds:RRR, S4  · Jugular venous pulsation Normal  · The carotid upstroke is normal  · Peripheral pulses are symmetrical and full  Respiratory: Good respiratory effort. On auscultation: clear to auscultation bilaterally  Abdomen:  · No masses or tenderness  · Bowel sounds present  Extremities:  ·  No Cyanosis or Clubbing  ·  Lower extremity edema: Yes  Skin: Warm and dry    Cardiac data:    EKG 2/10/2022: Normal sinus rhythm, first degree AVB, otherwise normal ECG    Labs:     CBC: No results for input(s): WBC, HGB, HCT, PLT in the last 72 hours.   BMP: No results for input(s): NA, K, CO2, BUN, CREATININE, LABGLOM, GLUCOSE in the last 72 hours. PT/INR: No results for input(s): PROTIME, INR in the last 72 hours. FASTING LIPID PANEL:  Lab Results   Component Value Date    HDL 61 03/05/2021    TRIG 57 03/05/2021     LIVER PROFILE:No results for input(s): AST, ALT, LABALBU in the last 72 hours. Assessment:    · CAD S/P PCI to D1 and LCX with Chronic RCA occlusion with left to right collaterals in 2016. Preserved LV systolic function.   · Preop cardiovascular evaluation  · Dyspnea on exertion   · COVID-19 infection January 2021  · HTN  · HLP: on statin  · Chronic smoking  · OA   · Obesity      Patient Active Problem List   Diagnosis    Hearing loss    GERD (gastroesophageal reflux disease)    Right knee pain    HTN (hypertension)    Arm pain    S/P cardiac cath    Coronary artery disease involving native coronary artery of native heart without angina pectoris    Anxiety    Primary osteoarthritis of left knee    Osteoarthritis of left knee    Status post left knee replacement       Recommendations:  Continue current medical therapy with aspirin, Lipitor, metoprolol and lisinopril  Given history of CAD and inability to determine functional capacity, recommend pharmacological stress test for further preoperative stratification  Continue to monitor blood pressure at home    Aggressive risk factor modification including complete smoking cessation discussed with patient in detail, he verbalized understanding and will continue to cut down    Routine follow-up in 6 months or earlier if needed      Benny Sarah MD, [facility] Cardiology Consult           [phone]

## 2022-02-11 DIAGNOSIS — Z01.818 PRE-OP TESTING: Primary | ICD-10-CM

## 2022-02-15 ENCOUNTER — HOSPITAL ENCOUNTER (OUTPATIENT)
Dept: NON INVASIVE DIAGNOSTICS | Age: 62
Discharge: HOME | End: 2022-02-15
Payer: COMMERCIAL

## 2022-02-15 ENCOUNTER — HOSPITAL ENCOUNTER (OUTPATIENT)
Dept: NUCLEAR MEDICINE | Age: 62
End: 2022-02-15
Payer: COMMERCIAL

## 2022-02-15 ENCOUNTER — HOSPITAL ENCOUNTER (OUTPATIENT)
Dept: NUCLEAR MEDICINE | Age: 62
Discharge: HOME | End: 2022-02-17
Payer: COMMERCIAL

## 2022-02-15 ENCOUNTER — TELEPHONE (OUTPATIENT)
Dept: CARDIOLOGY | Age: 62
End: 2022-02-15

## 2022-02-15 DIAGNOSIS — Z01.810 PREOP CARDIOVASCULAR EXAM: ICD-10-CM

## 2022-02-15 DIAGNOSIS — Z01.818 PRE-OP EXAMINATION: ICD-10-CM

## 2022-02-15 DIAGNOSIS — R94.39 ABNORMAL STRESS TEST: Primary | ICD-10-CM

## 2022-02-15 PROCEDURE — 93017 CV STRESS TEST TRACING ONLY: CPT

## 2022-02-15 PROCEDURE — 6360000002 HC RX W HCPCS: Performed by: INTERNAL MEDICINE

## 2022-02-15 PROCEDURE — 78452 HT MUSCLE IMAGE SPECT MULT: CPT

## 2022-02-15 PROCEDURE — 93018 CV STRESS TEST I&R ONLY: CPT | Performed by: INTERNAL MEDICINE

## 2022-02-15 PROCEDURE — A9500 TC99M SESTAMIBI: HCPCS | Performed by: INTERNAL MEDICINE

## 2022-02-15 PROCEDURE — 3430000000 HC RX DIAGNOSTIC RADIOPHARMACEUTICAL: Performed by: INTERNAL MEDICINE

## 2022-02-15 RX ADMIN — TETRAKIS(2-METHOXYISOBUTYLISOCYANIDE)COPPER(I) TETRAFLUOROBORATE 10 MILLICURIE: 1 INJECTION, POWDER, LYOPHILIZED, FOR SOLUTION INTRAVENOUS at 09:30

## 2022-02-15 RX ADMIN — REGADENOSON 0.4 MG: 0.08 INJECTION, SOLUTION INTRAVENOUS at 09:50

## 2022-02-15 RX ADMIN — TETRAKIS(2-METHOXYISOBUTYLISOCYANIDE)COPPER(I) TETRAFLUOROBORATE 30 MILLICURIE: 1 INJECTION, POWDER, LYOPHILIZED, FOR SOLUTION INTRAVENOUS at 09:31

## 2022-02-15 NOTE — PROCEDURES
[address], [place] [address]                              CARDIAC STRESS TEST    PATIENT NAME: Laura Page                     :        1960  MED REC NO:   [id number]                             ROOM:  ACCOUNT NO:   [de-identified]                           ADMIT DATE: 02/15/2022  PROVIDER:     Cris Thomas    DATE OF STUDY:  02/15/2022    STRESS TEST    Ordering Provider:  Girish Madrigal MD    Primary Care Provider:  Josemanuel Her MD    Patient's Age: 63     Height: 5 feet 11 inches  Weight: 235 pounds    INDICATION:  Pre-op. Lexiscan 0.4 mg injected over 10 seconds. IV Cardiolite injected 20 seconds post Lexiscan injection. Heart Rate: 58 Resting blood pressure:  132/79              HR   BP  1 min          85   107/59  2 min  3 min          76   116/65  4 min  5 min          73   115/64  6 min  7 min          70   103/61  8 min  9 min          80   123/69  10 min    Symptoms:  Chest Pain: No  Nausea: No  Headache:  No  Shortness of  breath: No  Other: No    Resting EKG:  Normal sinus rhythm, PAT, Mobitz II intermittently. Arrhythmias: None. EKG changes:  None. Maximum changes:  None. Leads with maximum changes:  None. EKG returned to baseline 0-1 minutes in recovery. INTERPRETATION:  1. Normal ECG portion of stress test.  2.  Nuclear perfusion scan report to follow.     Nuclear Myocardial Perfusion Imaging (SPECT)    TESTING METHOD  STRESS:   Lexiscan  AGENT:    Cardiolite  REST:          Injection Date:  02/15/2022  Time:  845  Amount:  10.3  mCi  STRESS:   Injection Date:  02/15/2022  Time:  955  Amount:  30.4 mCi  IMAGE TIME:    Rest:  929  Stress:      EF:  57%  TID:  1.06  LHR:  0.76    FINDINGS:  Ischemia (Reversible Defect):           Yes  Infarction (Irreversible Defect):       Yes  Normal Ejection fraction Calculated:    Yes, 57%  Normal Segmental wall motion: Yes  Diastolic LV Dysfunction:               Yes    Moderate risk study. IMPRESSION:  1. Inferior infarct with moderate sized ariel-infarct ischemia. 2.  EF 57%. Normal wall motion. 3.  Diastolic dysfunction.         Luidvina Novak    D: 02/15/2022 15:35:07       T: 02/15/2022 15:36:29     [email or web address]  Job#: [id number]     Doc#: Unknown    CC:  Viktoria Buchanan

## 2022-02-15 NOTE — TELEPHONE ENCOUNTER
Pt has nurse visit in Ortho tomorrow. Can we get cardiac clearance today, post stress today? Let Daniella Montero in ortho know.

## 2022-02-15 NOTE — PROGRESS NOTES
Patient Name:  Johnie Starkey MRN:  [id number]   :  1960  Age:  63 y.o. Sex: male     Ordering Provider: Denita Barthel, MD  Primary Care Provider:  Elie Glasgow MD     Indications: CAD, Pre-op     Medications:     Current Outpatient Medications:     atorvastatin (LIPITOR) 40 MG tablet, Take 1 tablet by mouth nightly, Disp: 90 tablet, Rfl: 3    Naproxen Sodium (ALEVE) 220 MG CAPS, Take 220 mg by mouth as needed for Pain, Disp: , Rfl:     lisinopril (PRINIVIL;ZESTRIL) 20 MG tablet, Take 1 tablet by mouth once daily, Disp: 90 tablet, Rfl: 3    metoprolol tartrate (LOPRESSOR) 25 MG tablet, Take 1 tablet by mouth twice daily, Disp: 180 tablet, Rfl: 3    ibuprofen (ADVIL;MOTRIN) 200 MG tablet, Take 200 mg by mouth every 6 hours as needed for Pain , Disp: , Rfl:     aspirin 81 MG tablet, Take 81 mg by mouth daily, Disp: , Rfl:     Handicap Placard MISC, by Does not apply route Valid for 90 days   s/p left total knee arthroplasty, Disp: 1 each, Rfl: 0    esomeprazole (NEXIUM) 20 MG delayed release capsule, Take 20 mg by mouth daily, Disp: , Rfl:     calcium carbonate 600 MG TABS tablet, Take 1 tablet by mouth daily, Disp: , Rfl:       ----------------------------------------------------------------------------------------------------------                Lexiscan 0.4 mg injected over 10 seconds. IV Cardiolite injected 20 seconds post Lexiscan injection.      Heart Rate:  58  Resting Blood Pressure:  132/79   ----------------------------------------------------------------------------------------------------------     HR BP   1 min 85 107/59   2 min     3 min 76 116/65   4 min     5 min 73 115/64   6 min     7 min 70 103/61   8 min     9 min 80 123/69   10 min       Symptoms:  Chest Pain:  No      Nausea:  No     Headache:  No    Shortness of Breath:  No     Other:  No      Electronically signed by Jackeline Robin RN on 2/15/22 at 9:40 AM EST    ----------------------------------------------------------------------------------------------------------    Resting EKG:  NSR, PAT, Rickey ANGELA intermittently     Arrhythmias:  None     EKG Changes:  None     Maximum Changes:  None     Leads with maximum changes:  None     EKG returned to baseline 0-1 minutes in recovery. Interpretation:    1. Normal ECG portion of stress test.  2. Nuclear perfusion scan report to follow.           Supervising Physician:  Lisa Estevez DO

## 2022-02-15 NOTE — TELEPHONE ENCOUNTER
Please review abn stress and advise. Pt and wife aware of results. Await Dr. Celestine Boston review. Jareth Cantu in Ortho notified, awaiting knee replacement.

## 2022-02-17 NOTE — TELEPHONE ENCOUNTER
Spoke with wife and relayed info, they agree to cath, they are aware Maximo Reason will call. They will use Er in meantime. Orders faxed.

## 2022-02-17 NOTE — TELEPHONE ENCOUNTER
Patient has moderate sized ischemia on stress test, recommend cardiac cath before the surgery. Surgery can be postponed unless deemed urgent or emergent.

## 2022-02-25 ENCOUNTER — HOSPITAL ENCOUNTER (OUTPATIENT)
Dept: CARDIAC CATH/INVASIVE PROCEDURES | Age: 62
Discharge: HOME | End: 2022-02-25
Payer: COMMERCIAL

## 2022-02-25 VITALS
BODY MASS INDEX: 32.48 KG/M2 | WEIGHT: 232 LBS | OXYGEN SATURATION: 95 % | RESPIRATION RATE: 14 BRPM | HEIGHT: 71 IN | HEART RATE: 56 BPM | DIASTOLIC BLOOD PRESSURE: 74 MMHG | SYSTOLIC BLOOD PRESSURE: 110 MMHG | TEMPERATURE: 98.3 F

## 2022-02-25 DIAGNOSIS — I25.10 CAD IN NATIVE ARTERY: ICD-10-CM

## 2022-02-25 LAB
GFR NON-AFRICAN AMERICAN: >60 ML/MIN
GFR SERPL CREATININE-BSD FRML MDRD: >60 ML/MIN
GFR SERPL CREATININE-BSD FRML MDRD: NORMAL ML/MIN/{1.73_M2}
GLUCOSE BLD-MCNC: 104 MG/DL (ref 74–100)
POC BUN: 15 MG/DL (ref 8–26)
POC CHLORIDE: 105 MMOL/L (ref 98–107)
POC CREATININE: 0.69 MG/DL (ref 0.51–1.19)
POC HEMATOCRIT: 47 % (ref 41–53)
POC HEMOGLOBIN: 16 G/DL (ref 13.5–17.5)
POC POTASSIUM: 4.7 MMOL/L (ref 3.5–4.5)
POC SODIUM: 140 MMOL/L (ref 138–146)

## 2022-02-25 PROCEDURE — C1894 INTRO/SHEATH, NON-LASER: HCPCS

## 2022-02-25 PROCEDURE — 2709999900 HC NON-CHARGEABLE SUPPLY

## 2022-02-25 PROCEDURE — 82565 ASSAY OF CREATININE: CPT

## 2022-02-25 PROCEDURE — 7100000001 HC PACU RECOVERY - ADDTL 15 MIN

## 2022-02-25 PROCEDURE — C1760 CLOSURE DEV, VASC: HCPCS

## 2022-02-25 PROCEDURE — 99153 MOD SED SAME PHYS/QHP EA: CPT

## 2022-02-25 PROCEDURE — C1887 CATHETER, GUIDING: HCPCS

## 2022-02-25 PROCEDURE — 7100000000 HC PACU RECOVERY - FIRST 15 MIN

## 2022-02-25 PROCEDURE — 84295 ASSAY OF SERUM SODIUM: CPT

## 2022-02-25 PROCEDURE — 82435 ASSAY OF BLOOD CHLORIDE: CPT

## 2022-02-25 PROCEDURE — 82947 ASSAY GLUCOSE BLOOD QUANT: CPT

## 2022-02-25 PROCEDURE — 93458 L HRT ARTERY/VENTRICLE ANGIO: CPT

## 2022-02-25 PROCEDURE — 99152 MOD SED SAME PHYS/QHP 5/>YRS: CPT

## 2022-02-25 PROCEDURE — 84132 ASSAY OF SERUM POTASSIUM: CPT

## 2022-02-25 PROCEDURE — 6360000002 HC RX W HCPCS

## 2022-02-25 PROCEDURE — 85014 HEMATOCRIT: CPT

## 2022-02-25 PROCEDURE — 84520 ASSAY OF UREA NITROGEN: CPT

## 2022-02-25 PROCEDURE — 6360000004 HC RX CONTRAST MEDICATION

## 2022-02-25 PROCEDURE — 2500000003 HC RX 250 WO HCPCS

## 2022-02-25 RX ORDER — SODIUM CHLORIDE 0.9 % (FLUSH) 0.9 %
5-40 SYRINGE (ML) INJECTION EVERY 12 HOURS SCHEDULED
Status: DISCONTINUED | OUTPATIENT
Start: 2022-02-25 | End: 2022-02-26 | Stop reason: HOSPADM

## 2022-02-25 RX ORDER — SODIUM CHLORIDE 9 MG/ML
INJECTION, SOLUTION INTRAVENOUS CONTINUOUS
Status: DISCONTINUED | OUTPATIENT
Start: 2022-02-25 | End: 2022-02-26 | Stop reason: HOSPADM

## 2022-02-25 RX ORDER — SODIUM CHLORIDE 9 MG/ML
25 INJECTION, SOLUTION INTRAVENOUS PRN
Status: DISCONTINUED | OUTPATIENT
Start: 2022-02-25 | End: 2022-02-26 | Stop reason: HOSPADM

## 2022-02-25 RX ORDER — SODIUM CHLORIDE 0.9 % (FLUSH) 0.9 %
5-40 SYRINGE (ML) INJECTION PRN
Status: DISCONTINUED | OUTPATIENT
Start: 2022-02-25 | End: 2022-02-26 | Stop reason: HOSPADM

## 2022-02-25 RX ORDER — ACETAMINOPHEN 325 MG/1
650 TABLET ORAL EVERY 4 HOURS PRN
Status: DISCONTINUED | OUTPATIENT
Start: 2022-02-25 | End: 2022-02-26 | Stop reason: HOSPADM

## 2022-02-25 RX ADMIN — SODIUM CHLORIDE: 9 INJECTION, SOLUTION INTRAVENOUS at 07:31

## 2022-02-25 NOTE — H&P
[facility]  Procedure History and Physical Update          Patient Name: Oriana East  MRN:    [id number]  YOB: 1960  Date of evaluation:  2/25/2022    Procedure:    Cardiac cath +/- PCI    Indication for procedure:  Abnormal stress test      Please refer to the office note completed by José Miguel Lala  on 02/10/2022 in the medical record and note that:    [x] I have examined the patient and reviewed the H&P/Consult and there are no changes to be made to the assessment or plan. [] I have examined the patient and reviewed the H&P/Consult and have noted the following changes:    Past Medical History:   Diagnosis Date    Bunion     right    CAD (coronary artery disease)     MI, stents    Diverticul disease small and large intestine, no perforati or abscess     GERD (gastroesophageal reflux disease)     Hammer toe     right    Heart attack ([facility] [address] 11/09/2009    Hyperlipidemia     Hypertension     Pericarditis     Positive cardiac stress test     Smoking history        Past Surgical History:   Procedure Laterality Date    CARDIAC CATHETERIZATION  02/25/2022    COLONOSCOPY  01/08/2018    diverticulosis,[id number]    CORONARY ANGIOPLASTY      stents x2    ENDOSCOPY, COLON, DIAGNOSTIC      FOOT SURGERY      JOINT REPLACEMENT Left     knee    KNEE ARTHROSCOPY  7-2006, 1-2014    TOTAL KNEE ARTHROPLASTY Left 2/26/2019    Left Total Knee Arthroplasty and injection of right knee performed by Gerry Last MD at [facility].         Family History   Problem Relation Age of Onset    Diabetes Mother     Early Death Brother 16        MVA    Early Death Sister 46        blood clot    Heart Disease Other        Allergies   Allergen Reactions    Ciprofloxacin Itching and Rash       Prior to Admission medications    Medication Sig Start Date End Date Taking?  Authorizing Provider   atorvastatin (LIPITOR) 40 MG tablet Take 1 tablet by mouth nightly 9/20/21  Yes Robert Thomas,    Naproxen Sodium (ALEVE) 220 MG CAPS Take 220 mg by mouth as needed for Pain   Yes Historical Provider, MD   lisinopril (PRINIVIL;ZESTRIL) 20 MG tablet Take 1 tablet by mouth once daily 6/24/21  Yes Abdulaziz Branham MD   metoprolol tartrate (LOPRESSOR) 25 MG tablet Take 1 tablet by mouth twice daily 3/29/21  Yes Laurie Hi MD   ibuprofen (ADVIL;MOTRIN) 200 MG tablet Take 200 mg by mouth every 6 hours as needed for Pain    Yes Historical Provider, MD   aspirin 81 MG tablet Take 81 mg by mouth daily   Yes Historical Provider, MD   esomeprazole (NEXIUM) 20 MG delayed release capsule Take 20 mg by mouth daily   Yes Historical Provider, MD   calcium carbonate 600 MG TABS tablet Take 1 tablet by mouth daily   Yes Historical Provider, MD   Handicap Placard MISC by Does not apply route Valid for 90 days     s/p left total knee arthroplasty 3/4/19   Gerry Last MD         There were no vitals filed for this visit. Constitutional and General Appearance:   alert, cooperative, no distress and appears stated age  [de-identified]:  · PERRL, EOMI  Respiratory:  · Normal excursion and expansion without use of accessory muscles  · Resp Auscultation:  Good respiratory effort. No for increased work of breathing. On auscultation: clear to auscultation bilaterally  Cardiovascular:  · Regular rate and rhythm. · S1/S2  · No murmurs. · The apical impulse is not displaced  Abdomen:  · Soft  · Bowel sounds present  · Non-tender to palpation  Extremities:  · No cyanosis or clubbing  · Lower extremity edema: No.  Skin:  · Warm and dry  Neurological:  · Alert and oriented. · Moves all extremities well       IMPRESSION:  1. Inferior infarct with moderate sized ariel-infarct ischemia. 2.  EF 57%. Normal wall motion. 3.  Diastolic dysfunction. Assessment   1.  Abnormal stress test   2  H/o PCI to LAD and LCx in 2016  3   Pre Op Knee replacement   Plan:  · Proceed with planned procedure. · Further orders to follow. Pre Procedure Conscious Sedation Data:     ASA Class:                  [] I [] II [x] III [] IV     Mallampati Class:       [] I [] II [x] III [] IV    Risks, benefits, and alternatives of cardiac catheterization were discussed, in detail, with patient. Risks include, but not limited to, bleeding, requiring blood transfusion, vascular complication requiring surgery, renal failure with need of dialysis, CVA, MI, death and anesthesia complications including intubation were discussed. Patient verbalized understanding and agreed to proceed with the procedure understanding the above risks and alternatives to the procedure. Electronically signed on 02/25/22 at 7:33 AM by:    Shawna Car MD, MD   Fellow, [address]      Attending Physician Statement  I have discussed the care of Ludwig Pino, including pertinent history and exam findings,  with the cardiology fellow/resident. I have seen and examined the patient and the key elements of all parts of the encounter have been performed by me.           Wes Dover MD, [facility]

## 2022-02-25 NOTE — PROGRESS NOTES
Received post procedure to [facility] to room 3. Assessment obtained. Restrictions reviewed with patient. Post procedure pathway initiated. Right femoral site soft , dressing dry and intact. No hematoma noted. Family at side. Patient without complaints. Head of bed flat with right leg straight. Taking juice well.

## 2022-02-25 NOTE — OP NOTE
[facility]  Cardiac catheterization note        Date:   2/25/2022  Patient name: Natalie Mejia  Date of admission:  2/25/2022  6:53 AM  MRN:   [id number]  YOB: 1960    Operators:  Brandon Yuen MD (Attending Physician)     Camille Cisneros                                (CV Fellow)      Pre Procedure Conscious Sedation Data:    ASA Class:    [] I [x] II [] III [] IV    Mallampati Class:  [] I [x] II [] III [] IV      Indications:  1. Abnormal stress test   2. Pre Op cardiac evaluation   3. Prior PCI with stent placement     Procedure:   1. Left heart catheterization   2. Selective left and right coronary angiography   3. Left ventriculography       Access:  [x] Femoral  [x] Radial  artery       [x] Right  [] Left    Procedure: After informed consent was obtained with explanation of the risks and benefits, patient was brought to the cath lab. The access area was prepped and draped in sterile fashion. 1% lidocaine was used for local block. The artery was cannulated with 6  Fr sheath with brisk arterial blood return. The side port was frequently flushed and aspirated with normal saline. Findings:   Angiographic Findings        Cardiac Arteries and Lesion Findings       LMCA: Normal 0% stenosis. LAD: Mid 30-40% stenosis (same as on last angiogram in 2016)   D1 is large with patent mid stent   D2 is large, patent with mild disease     LCx: Mild irregularities 10-20%. Patent stent in the mid segment     RCA: Mid 100% chronic total occlusion with good left to right collaterals      Coronary Tree        Dominance: Right       LV Analysis   LV function assessed as:Normal.   Ejection Fraction   +----------------------------------------------------------------------+---+   ! Method                                                                !EF%! +----------------------------------------------------------------------+---+   ! LV gram                                                               !60 !   +----------------------------------------------------------------------+---+      Procedure Summary    1. Widely patent stents in D1 and Lcx    2.  Known  of RCA with left to right Collaterals    3. Normal LV systolic function.        Recommendations    Routine Post Diagnostic Cath orders.    Medical therapy as needed.    Risk factor modification.            Estimated Blood Loss:            [x] Minimal 10 cc   [] Minimal < 25 cc      [] Moderate 25-50 cc         [] >50 cc        Electronically signed by Genna Funes MD on 2/25/2022 at 8:07 AM  Cardiovascular fellow  [facility]          Attending Physician    Maritza Magallanes MD, [facility]

## 2022-02-28 NOTE — TELEPHONE ENCOUNTER
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 Wife just called as well and they would like to get in this week for the pre-procedure appt.  Pt and wife are asking for a call from Thi castro in the am to discuss get in and what the plan is.     [phone]

## 2022-03-01 NOTE — TELEPHONE ENCOUNTER
Writer called and spoke to pt wife. Apology made for miss informing them of a completed clearance for his knee surgery. Writer thought the pt was cleared after cardiac cath not realizing the clearance was actually to have the cath done. Informed wife we are waiting for the official clearance from Dr Bernardo Mon. A note is out to him and we will get clearance when he responds either to the message put out to him or when he gets in the office on Thursday. Explained this to pt wife and she stated pt would probably be calling the office himself, writer voiced understanding and will await his call.

## 2022-03-01 NOTE — TELEPHONE ENCOUNTER
Patient is low  to moderate risk for ortho surgery. This was communicated to the family in detail after the cath. I have written a new letter too.

## 2022-03-01 NOTE — TELEPHONE ENCOUNTER
Cardiology Consultation  [facility] [address], [address]  [phone]      [unfilled]      Regarding:  Anastacio Rangel  1960      To Whom It May Concern,      Patient is  Low to Intermediate Cardiac Risk for planned knee surgery. Special instructions:  Patient is taking ASA and can be held for 5-7 days prior to procedure and resumed as soon as surgical bleeding risk has resolved. Please continue other meds.         Thank you,      Omar Xie MD [place] [address] [facility]

## 2022-03-01 NOTE — TELEPHONE ENCOUNTER
Pt called office and while talking to him writer noticed the clearance had come trough. Writer called ortho and was informed to let the pt know they would call him today to get him in tomorrow for the nurse visit. Pt informed.

## 2022-03-02 ENCOUNTER — PRE-PROCEDURE TELEPHONE (OUTPATIENT)
Dept: PREADMISSION TESTING | Age: 62
End: 2022-03-02

## 2022-03-02 ENCOUNTER — NURSE ONLY (OUTPATIENT)
Dept: ORTHOPEDIC SURGERY | Age: 62
End: 2022-03-02
Payer: COMMERCIAL

## 2022-03-02 ENCOUNTER — HOSPITAL ENCOUNTER (OUTPATIENT)
Dept: LAB | Age: 62
Discharge: HOME | End: 2022-03-02
Payer: COMMERCIAL

## 2022-03-02 ENCOUNTER — HOSPITAL ENCOUNTER (OUTPATIENT)
Dept: PREADMISSION TESTING | Age: 62
Setting detail: SPECIMEN
Discharge: HOME | End: 2022-03-06
Payer: COMMERCIAL

## 2022-03-02 ENCOUNTER — HOSPITAL ENCOUNTER (OUTPATIENT)
Age: 62
Setting detail: SPECIMEN
Discharge: HOME | End: 2022-03-02
Payer: COMMERCIAL

## 2022-03-02 ENCOUNTER — TELEPHONE (OUTPATIENT)
Dept: ORTHOPEDIC SURGERY | Age: 62
End: 2022-03-02

## 2022-03-02 DIAGNOSIS — Z01.818 PRE-OP TESTING: ICD-10-CM

## 2022-03-02 DIAGNOSIS — Z11.59 ENCOUNTER FOR SCREENING FOR OTHER VIRAL DISEASES: Primary | ICD-10-CM

## 2022-03-02 DIAGNOSIS — M17.11 PRIMARY OSTEOARTHRITIS OF RIGHT KNEE: ICD-10-CM

## 2022-03-02 DIAGNOSIS — Z01.818 PRE-OP TESTING: Primary | ICD-10-CM

## 2022-03-02 LAB
-: NORMAL
BACTERIA: NORMAL
BILIRUBIN URINE: NEGATIVE
EPITHELIAL CELLS UA: NORMAL /HPF (ref 0–5)
GLUCOSE URINE: NEGATIVE
KETONES, URINE: NEGATIVE
LEUKOCYTE ESTERASE, URINE: NEGATIVE
NITRITE, URINE: NEGATIVE
PH UA: 6.5 (ref 5–6)
PROTEIN UA: NEGATIVE
RBC UA: NORMAL /HPF (ref 0–4)
SPECIFIC GRAVITY UA: 1.01 (ref 1.01–1.02)
URINE HGB: NEGATIVE
UROBILINOGEN, URINE: NORMAL
WBC UA: NORMAL /HPF (ref 0–4)

## 2022-03-02 PROCEDURE — 99215 OFFICE O/P EST HI 40 MIN: CPT | Performed by: ORTHOPAEDIC SURGERY

## 2022-03-02 PROCEDURE — 87641 MR-STAPH DNA AMP PROBE: CPT

## 2022-03-02 PROCEDURE — U0003 INFECTIOUS AGENT DETECTION BY NUCLEIC ACID (DNA OR RNA); SEVERE ACUTE RESPIRATORY SYNDROME CORONAVIRUS 2 (SARS-COV-2) (CORONAVIRUS DISEASE [COVID-19]), AMPLIFIED PROBE TECHNIQUE, MAKING USE OF HIGH THROUGHPUT TECHNOLOGIES AS DESCRIBED BY CMS-2020-01-R: HCPCS

## 2022-03-02 PROCEDURE — 99999 PR OFFICE/OUTPT VISIT,PROCEDURE ONLY: CPT | Performed by: ORTHOPAEDIC SURGERY

## 2022-03-02 PROCEDURE — 81001 URINALYSIS AUTO W/SCOPE: CPT

## 2022-03-02 PROCEDURE — U0005 INFEC AGEN DETEC AMPLI PROBE: HCPCS

## 2022-03-02 NOTE — TELEPHONE ENCOUNTER
[facility] SPECIALTY [facility]    Pre-Operative Evaluation/Consultation    Name:  Norah Torres                                         Age:  63 y.o. MRN:  [id number]       :  1960   Date:  3/2/2022         Sex: male    There were no encounter diagnoses. Surgeon:  Dr. Jacinto Garcia  Procedure (Planned):  Right TKA  Date Scheduled surgery: 22    Attending : No att. providers found    Primary Physician: Holly Dolan  Cardiologist: Madalyn Magaña    Type of Anesthesia Requested: Anesthesia Provider's Choice    Patient Medical history:   Allergies   Allergen Reactions    Ciprofloxacin Itching and Rash     Patient Active Problem List   Diagnosis    Hearing loss    GERD (gastroesophageal reflux disease)    Right knee pain    HTN (hypertension)    Arm pain    S/P cardiac cath    Coronary artery disease involving native coronary artery of native heart without angina pectoris    Anxiety    Primary osteoarthritis of left knee    Osteoarthritis of left knee    Status post left knee replacement    CAD in native artery     Past Medical History:   Diagnosis Date    Bunion     right    CAD (coronary artery disease)     MI, stents    Diverticul disease small and large intestine, no perforati or abscess     GERD (gastroesophageal reflux disease)     Hammer toe     right    Heart attack ([address] 2009    Hyperlipidemia     Hypertension     Pericarditis     Positive cardiac stress test     Smoking history      Past Surgical History:   Procedure Laterality Date    CARDIAC CATHETERIZATION  2022    COLONOSCOPY  2018    diverticulosis,[id number]    CORONARY ANGIOPLASTY      stents x2    ENDOSCOPY, COLON, DIAGNOSTIC      FOOT SURGERY      JOINT REPLACEMENT Left     knee    KNEE ARTHROSCOPY  ,     TOTAL KNEE ARTHROPLASTY Left 2019    Left Total Knee Arthroplasty and injection of right knee performed by Tricia Clayton MD at [address] ENDOSCOPY       Social History     Tobacco Use    Smoking status: Current Every Day Smoker     Packs/day: 0.50     Years: 30.00     Pack years: 15.00     Types: Cigarettes    Smokeless tobacco: Never Used   Vaping Use    Vaping Use: Never used   Substance Use Topics    Alcohol use: Yes     Alcohol/week: 12.0 standard drinks     Types: 12 Standard drinks or equivalent per week     Comment: daily 1 beer    Drug use: No     Medications:  Current Outpatient Medications   Medication Sig Dispense Refill    atorvastatin (LIPITOR) 40 MG tablet Take 1 tablet by mouth nightly 90 tablet 3    Naproxen Sodium (ALEVE) 220 MG CAPS Take 220 mg by mouth as needed for Pain      lisinopril (PRINIVIL;ZESTRIL) 20 MG tablet Take 1 tablet by mouth once daily 90 tablet 3    metoprolol tartrate (LOPRESSOR) 25 MG tablet Take 1 tablet by mouth twice daily 180 tablet 3    ibuprofen (ADVIL;MOTRIN) 200 MG tablet Take 200 mg by mouth every 6 hours as needed for Pain       aspirin 81 MG tablet Take 81 mg by mouth daily      Handicap Placard MISC by Does not apply route Valid for 90 days     s/p left total knee arthroplasty 1 each 0    esomeprazole (NEXIUM) 20 MG delayed release capsule Take 20 mg by mouth daily      calcium carbonate 600 MG TABS tablet Take 1 tablet by mouth daily       No current facility-administered medications for this visit. Scheduled Meds:  Continuous Infusions:  PRN Meds:. Prior to Admission medications    Medication Sig Start Date End Date Taking?  Authorizing Provider   atorvastatin (LIPITOR) 40 MG tablet Take 1 tablet by mouth nightly 9/20/21   Robert Thomas DO   Naproxen Sodium (ALEVE) 220 MG CAPS Take 220 mg by mouth as needed for Pain    Historical Provider, MD   lisinopril (PRINIVIL;ZESTRIL) 20 MG tablet Take 1 tablet by mouth once daily 6/24/21   Rajeev Jeffers MD   metoprolol tartrate (LOPRESSOR) 25 MG tablet Take 1 tablet by mouth twice daily 3/29/21   Nicole Starr MD   ibuprofen (ADVIL;MOTRIN) 200 MG tablet Take 200 mg by mouth every 6 hours as needed for Pain     Historical Provider, MD   aspirin 81 MG tablet Take 81 mg by mouth daily    Historical Provider, MD   Handeleanor Bryantard MISC by Does not apply route Valid for 90 days     s/p left total knee arthroplasty 3/4/19   Jae Schaefer MD   esomeprazole (NEXIUM) 20 MG delayed release capsule Take 20 mg by mouth daily    Historical Provider, MD   calcium carbonate 600 MG TABS tablet Take 1 tablet by mouth daily    Historical Provider, MD     Vital Signs (Current) [unfilled]    Weight:   Wt Readings from Last 1 Encounters:   02/25/22 232 lb (105.2 kg)     Height:   Ht Readings from Last 1 Encounters:   02/25/22 5' 11\" (1.803 m)      BMI:  There is no height or weight on file to calculate BMI. Estimated body mass index is 32.36 kg/m² as calculated from the following:    Height as of 2/25/22: 5' 11\" (1.803 m). Weight as of 2/25/22: 232 lb (105.2 kg). body mass index is unknown because there is no height or weight on file. Cardiac Clearance: [address] [place] [address] Clearance:cleared by    Appointment for surgery Clearance scheduled for:Date:02/07/22     Preoperative Testing:   These are the current and completed labs:  CBC:   Lab Results   Component Value Date    WBC 10.3 02/07/2022    RBC 4.95 02/07/2022    HGB 15.1 02/07/2022    HCT 45.3 02/07/2022    MCV 91.5 02/07/2022    RDW 13.1 02/07/2022     02/07/2022     CMP:   Lab Results   Component Value Date     03/05/2021    K 4.9 03/05/2021     03/05/2021    CO2 26 03/05/2021    BUN 15 03/05/2021    CREATININE 0.69 02/25/2022    CREATININE 0.80 03/05/2021    GFRAA >60 03/05/2021    LABGLOM >60 02/25/2022    GLUCOSE 109 03/05/2021    PROT 6.6 03/05/2021    CALCIUM 9.4 03/05/2021    BILITOT 0.82 03/05/2021    ALKPHOS 73 03/05/2021    AST 20 03/05/2021    ALT 17 03/05/2021     POC Tests: No results for input(s): POCGLU, POCNA, POCK, POCCL, POCBUN, POCHEMO, POCHCT in the last 72 hours.   [facility]    Lab Results   Component Value Date    PROTIME 10.4 02/25/2019    INR 1.0 02/25/2019    APTT 26.0 27/03/8647     HCG (If Applicable) No results found for: PREGTESTUR, PREGSERUM, HCG, HCGQUANT   ABGs No results found for: PHART, PO2ART, VCK2HOL, BOM5EHL, BEART, M1MUPGLP   Type & Screen (If Applicable)  No results found for: Jaiden Chan    ther Radiology      [] Sent to Anesthesia for your review: 03/02/22   [] Additional Orders: None     Comments:None   Requests: None    Signed: Michelle Lutz LPN 8/0/6835 13:51 PM

## 2022-03-02 NOTE — TELEPHONE ENCOUNTER
Spoke with patients wife and confirmed a covid swab appt for [facility] today, March 2nd at 1245. Instructions provided, verbalizes understanding.

## 2022-03-02 NOTE — PROGRESS NOTES
PATIENT SCHEDULED FOR RT TKA  CONSENT SIGNED AND REVIEWED  MRSA CULTURE OBTAINED  SURGERY SCHEDULED FOR 03/08/22

## 2022-03-03 LAB
MRSA, DNA, NASAL: NEGATIVE
SARS-COV-2: NORMAL
SARS-COV-2: NOT DETECTED
SOURCE: NORMAL
SPECIMEN DESCRIPTION: NORMAL

## 2022-03-07 ENCOUNTER — HOSPITAL ENCOUNTER (OUTPATIENT)
Dept: LAB | Age: 62
Discharge: HOME | End: 2022-03-07
Payer: COMMERCIAL

## 2022-03-07 DIAGNOSIS — Z01.818 PRE-OP TESTING: ICD-10-CM

## 2022-03-07 LAB
ABO/RH: NORMAL
ABSOLUTE EOS #: 0.19 K/UL (ref 0–0.44)
ABSOLUTE IMMATURE GRANULOCYTE: <0.03 K/UL (ref 0–0.3)
ABSOLUTE LYMPH #: 2.09 K/UL (ref 1.1–3.7)
ABSOLUTE MONO #: 1.1 K/UL (ref 0.1–1.2)
ANTIBODY SCREEN: NEGATIVE
ARM BAND NUMBER: NORMAL
BASOPHILS # BLD: 0 % (ref 0–2)
BASOPHILS ABSOLUTE: 0.03 K/UL (ref 0–0.2)
EOSINOPHILS RELATIVE PERCENT: 2 % (ref 1–4)
EXPIRATION DATE: NORMAL
HCT VFR BLD CALC: 46.6 % (ref 40.7–50.3)
HEMOGLOBIN: 15.7 G/DL (ref 13–17)
IMMATURE GRANULOCYTES: 0 %
LYMPHOCYTES # BLD: 24 % (ref 24–43)
MCH RBC QN AUTO: 31.1 PG (ref 25.2–33.5)
MCHC RBC AUTO-ENTMCNC: 33.7 G/DL (ref 25.2–33.5)
MCV RBC AUTO: 92.3 FL (ref 82.6–102.9)
MONOCYTES # BLD: 13 % (ref 3–12)
NRBC AUTOMATED: 0 PER 100 WBC
PDW BLD-RTO: 13.8 % (ref 11.8–14.4)
PLATELET # BLD: 221 K/UL (ref 138–453)
PMV BLD AUTO: 10.4 FL (ref 8.1–13.5)
RBC # BLD: 5.05 M/UL (ref 4.21–5.77)
SEG NEUTROPHILS: 61 % (ref 36–65)
SEGMENTED NEUTROPHILS ABSOLUTE COUNT: 5.23 K/UL (ref 1.5–8.1)
WBC # BLD: 8.7 K/UL (ref 3.5–11.3)

## 2022-03-07 PROCEDURE — 86900 BLOOD TYPING SEROLOGIC ABO: CPT

## 2022-03-07 PROCEDURE — 85025 COMPLETE CBC W/AUTO DIFF WBC: CPT

## 2022-03-07 PROCEDURE — 36415 COLL VENOUS BLD VENIPUNCTURE: CPT

## 2022-03-07 PROCEDURE — 86901 BLOOD TYPING SEROLOGIC RH(D): CPT

## 2022-03-07 PROCEDURE — 86850 RBC ANTIBODY SCREEN: CPT

## 2022-03-08 ENCOUNTER — ANESTHESIA EVENT (OUTPATIENT)
Dept: OPERATING ROOM | Age: 62
End: 2022-03-08
Payer: COMMERCIAL

## 2022-03-08 ENCOUNTER — ANESTHESIA (OUTPATIENT)
Dept: OPERATING ROOM | Age: 62
End: 2022-03-08
Payer: COMMERCIAL

## 2022-03-08 ENCOUNTER — HOSPITAL ENCOUNTER (OUTPATIENT)
Age: 62
Setting detail: OBSERVATION
Discharge: HOME | End: 2022-03-09
Attending: ORTHOPAEDIC SURGERY | Admitting: ORTHOPAEDIC SURGERY
Payer: COMMERCIAL

## 2022-03-08 ENCOUNTER — APPOINTMENT (OUTPATIENT)
Dept: GENERAL RADIOLOGY | Age: 62
End: 2022-03-08
Attending: ORTHOPAEDIC SURGERY
Payer: COMMERCIAL

## 2022-03-08 VITALS
SYSTOLIC BLOOD PRESSURE: 141 MMHG | TEMPERATURE: 98.3 F | OXYGEN SATURATION: 94 % | RESPIRATION RATE: 15 BRPM | DIASTOLIC BLOOD PRESSURE: 69 MMHG

## 2022-03-08 DIAGNOSIS — Z96.651 STATUS POST RIGHT KNEE REPLACEMENT: Primary | ICD-10-CM

## 2022-03-08 LAB
ABSOLUTE EOS #: <0.03 K/UL (ref 0–0.44)
ABSOLUTE IMMATURE GRANULOCYTE: 0.06 K/UL (ref 0–0.3)
ABSOLUTE LYMPH #: 0.71 K/UL (ref 1.1–3.7)
ABSOLUTE MONO #: 0.18 K/UL (ref 0.1–1.2)
BASOPHILS # BLD: 0 % (ref 0–2)
BASOPHILS ABSOLUTE: <0.03 K/UL (ref 0–0.2)
EKG ATRIAL RATE: 79 BPM
EKG P AXIS: 58 DEGREES
EKG P-R INTERVAL: 224 MS
EKG Q-T INTERVAL: 350 MS
EKG QRS DURATION: 86 MS
EKG QTC CALCULATION (BAZETT): 401 MS
EKG R AXIS: 57 DEGREES
EKG T AXIS: 49 DEGREES
EKG VENTRICULAR RATE: 79 BPM
EOSINOPHILS RELATIVE PERCENT: 0 % (ref 1–4)
HCT VFR BLD CALC: 45.9 % (ref 40.7–50.3)
HEMOGLOBIN: 15 G/DL (ref 13–17)
IMMATURE GRANULOCYTES: 1 %
LYMPHOCYTES # BLD: 6 % (ref 24–43)
MAGNESIUM: 1.8 MG/DL (ref 1.6–2.6)
MCH RBC QN AUTO: 30.2 PG (ref 25.2–33.5)
MCHC RBC AUTO-ENTMCNC: 32.7 G/DL (ref 25.2–33.5)
MCV RBC AUTO: 92.5 FL (ref 82.6–102.9)
MONOCYTES # BLD: 2 % (ref 3–12)
NRBC AUTOMATED: 0 PER 100 WBC
PDW BLD-RTO: 13.9 % (ref 11.8–14.4)
PLATELET # BLD: 223 K/UL (ref 138–453)
PMV BLD AUTO: 10.4 FL (ref 8.1–13.5)
RBC # BLD: 4.96 M/UL (ref 4.21–5.77)
SEG NEUTROPHILS: 92 % (ref 36–65)
SEGMENTED NEUTROPHILS ABSOLUTE COUNT: 10.94 K/UL (ref 1.5–8.1)
WBC # BLD: 11.9 K/UL (ref 3.5–11.3)

## 2022-03-08 PROCEDURE — 2500000003 HC RX 250 WO HCPCS: Performed by: NURSE ANESTHETIST, CERTIFIED REGISTERED

## 2022-03-08 PROCEDURE — 2709999900 HC NON-CHARGEABLE SUPPLY: Performed by: ORTHOPAEDIC SURGERY

## 2022-03-08 PROCEDURE — 3600000004 HC SURGERY LEVEL 4 BASE: Performed by: ORTHOPAEDIC SURGERY

## 2022-03-08 PROCEDURE — 7100000001 HC PACU RECOVERY - ADDTL 15 MIN: Performed by: ORTHOPAEDIC SURGERY

## 2022-03-08 PROCEDURE — 3600000014 HC SURGERY LEVEL 4 ADDTL 15MIN: Performed by: ORTHOPAEDIC SURGERY

## 2022-03-08 PROCEDURE — C1776 JOINT DEVICE (IMPLANTABLE): HCPCS | Performed by: ORTHOPAEDIC SURGERY

## 2022-03-08 PROCEDURE — 85025 COMPLETE CBC W/AUTO DIFF WBC: CPT

## 2022-03-08 PROCEDURE — 2720000010 HC SURG SUPPLY STERILE: Performed by: ORTHOPAEDIC SURGERY

## 2022-03-08 PROCEDURE — 99213 OFFICE O/P EST LOW 20 MIN: CPT | Performed by: INTERNAL MEDICINE

## 2022-03-08 PROCEDURE — 27447 TOTAL KNEE ARTHROPLASTY: CPT | Performed by: ORTHOPAEDIC SURGERY

## 2022-03-08 PROCEDURE — 36415 COLL VENOUS BLD VENIPUNCTURE: CPT

## 2022-03-08 PROCEDURE — 2580000003 HC RX 258: Performed by: ORTHOPAEDIC SURGERY

## 2022-03-08 PROCEDURE — 6360000002 HC RX W HCPCS: Performed by: NURSE ANESTHETIST, CERTIFIED REGISTERED

## 2022-03-08 PROCEDURE — 6360000002 HC RX W HCPCS: Performed by: ORTHOPAEDIC SURGERY

## 2022-03-08 PROCEDURE — G0378 HOSPITAL OBSERVATION PER HR: HCPCS

## 2022-03-08 PROCEDURE — 97161 PT EVAL LOW COMPLEX 20 MIN: CPT | Performed by: PHYSICAL THERAPIST

## 2022-03-08 PROCEDURE — 2580000003 HC RX 258: Performed by: NURSE PRACTITIONER

## 2022-03-08 PROCEDURE — 6360000002 HC RX W HCPCS: Performed by: NURSE PRACTITIONER

## 2022-03-08 PROCEDURE — 6370000000 HC RX 637 (ALT 250 FOR IP): Performed by: INTERNAL MEDICINE

## 2022-03-08 PROCEDURE — 6370000000 HC RX 637 (ALT 250 FOR IP): Performed by: NURSE ANESTHETIST, CERTIFIED REGISTERED

## 2022-03-08 PROCEDURE — 93005 ELECTROCARDIOGRAM TRACING: CPT | Performed by: INTERNAL MEDICINE

## 2022-03-08 PROCEDURE — 27447 TOTAL KNEE ARTHROPLASTY: CPT | Performed by: PHYSICIAN ASSISTANT

## 2022-03-08 PROCEDURE — 82248 BILIRUBIN DIRECT: CPT

## 2022-03-08 PROCEDURE — 3700000001 HC ADD 15 MINUTES (ANESTHESIA): Performed by: ORTHOPAEDIC SURGERY

## 2022-03-08 PROCEDURE — 6370000000 HC RX 637 (ALT 250 FOR IP): Performed by: NURSE PRACTITIONER

## 2022-03-08 PROCEDURE — 73560 X-RAY EXAM OF KNEE 1 OR 2: CPT

## 2022-03-08 PROCEDURE — 80053 COMPREHEN METABOLIC PANEL: CPT

## 2022-03-08 PROCEDURE — 83735 ASSAY OF MAGNESIUM: CPT

## 2022-03-08 PROCEDURE — 97165 OT EVAL LOW COMPLEX 30 MIN: CPT | Performed by: OCCUPATIONAL THERAPIST

## 2022-03-08 PROCEDURE — 2500000003 HC RX 250 WO HCPCS: Performed by: ORTHOPAEDIC SURGERY

## 2022-03-08 PROCEDURE — 7100000000 HC PACU RECOVERY - FIRST 15 MIN: Performed by: ORTHOPAEDIC SURGERY

## 2022-03-08 PROCEDURE — C1713 ANCHOR/SCREW BN/BN,TIS/BN: HCPCS | Performed by: ORTHOPAEDIC SURGERY

## 2022-03-08 PROCEDURE — 3700000000 HC ANESTHESIA ATTENDED CARE: Performed by: ORTHOPAEDIC SURGERY

## 2022-03-08 DEVICE — IMPLANTABLE DEVICE: Type: IMPLANTABLE DEVICE | Site: KNEE | Status: FUNCTIONAL

## 2022-03-08 DEVICE — COMPONENT PAT 29MM DIA 6MM THCK 3 PEG 3 RND PRI STD CEM NP: Type: IMPLANTABLE DEVICE | Site: KNEE | Status: FUNCTIONAL

## 2022-03-08 DEVICE — CEMENT BNE 40GM FULL DOSE PMMA W/O ANTIBIO M VISC RADPQ: Type: IMPLANTABLE DEVICE | Site: KNEE | Status: FUNCTIONAL

## 2022-03-08 DEVICE — KNEE K1 TOT HEMI STD CEM IMPL CAPPED K1 ZIM: Type: IMPLANTABLE DEVICE | Site: KNEE | Status: FUNCTIONAL

## 2022-03-08 RX ORDER — FENTANYL CITRATE 50 UG/ML
INJECTION, SOLUTION INTRAMUSCULAR; INTRAVENOUS PRN
Status: DISCONTINUED | OUTPATIENT
Start: 2022-03-08 | End: 2022-03-08 | Stop reason: SDUPTHER

## 2022-03-08 RX ORDER — ALBUTEROL SULFATE 2.5 MG/3ML
2.5 SOLUTION RESPIRATORY (INHALATION) EVERY 6 HOURS PRN
Status: DISCONTINUED | OUTPATIENT
Start: 2022-03-08 | End: 2022-03-09 | Stop reason: HOSPADM

## 2022-03-08 RX ORDER — GABAPENTIN 300 MG/1
CAPSULE ORAL PRN
Status: DISCONTINUED | OUTPATIENT
Start: 2022-03-08 | End: 2022-03-08 | Stop reason: SDUPTHER

## 2022-03-08 RX ORDER — DIPHENHYDRAMINE HYDROCHLORIDE 50 MG/ML
12.5 INJECTION INTRAMUSCULAR; INTRAVENOUS
Status: DISCONTINUED | OUTPATIENT
Start: 2022-03-08 | End: 2022-03-08 | Stop reason: HOSPADM

## 2022-03-08 RX ORDER — GLYCOPYRROLATE 1 MG/5 ML
SYRINGE (ML) INTRAVENOUS PRN
Status: DISCONTINUED | OUTPATIENT
Start: 2022-03-08 | End: 2022-03-08 | Stop reason: SDUPTHER

## 2022-03-08 RX ORDER — MORPHINE SULFATE 2 MG/ML
2 INJECTION, SOLUTION INTRAMUSCULAR; INTRAVENOUS
Status: DISCONTINUED | OUTPATIENT
Start: 2022-03-08 | End: 2022-03-09 | Stop reason: HOSPADM

## 2022-03-08 RX ORDER — ACETAMINOPHEN 325 MG/1
650 TABLET ORAL EVERY 4 HOURS PRN
Status: DISCONTINUED | OUTPATIENT
Start: 2022-03-08 | End: 2022-03-09 | Stop reason: HOSPADM

## 2022-03-08 RX ORDER — MIDAZOLAM HYDROCHLORIDE 1 MG/ML
INJECTION INTRAMUSCULAR; INTRAVENOUS PRN
Status: DISCONTINUED | OUTPATIENT
Start: 2022-03-08 | End: 2022-03-08 | Stop reason: SDUPTHER

## 2022-03-08 RX ORDER — PANTOPRAZOLE SODIUM 40 MG/1
40 TABLET, DELAYED RELEASE ORAL
Status: DISCONTINUED | OUTPATIENT
Start: 2022-03-09 | End: 2022-03-09 | Stop reason: HOSPADM

## 2022-03-08 RX ORDER — SODIUM CHLORIDE 0.9 % (FLUSH) 0.9 %
5-40 SYRINGE (ML) INJECTION EVERY 12 HOURS SCHEDULED
Status: DISCONTINUED | OUTPATIENT
Start: 2022-03-08 | End: 2022-03-08 | Stop reason: HOSPADM

## 2022-03-08 RX ORDER — ROCURONIUM BROMIDE 10 MG/ML
INJECTION, SOLUTION INTRAVENOUS PRN
Status: DISCONTINUED | OUTPATIENT
Start: 2022-03-08 | End: 2022-03-08 | Stop reason: SDUPTHER

## 2022-03-08 RX ORDER — TRANEXAMIC ACID 100 MG/ML
INJECTION, SOLUTION INTRAVENOUS PRN
Status: DISCONTINUED | OUTPATIENT
Start: 2022-03-08 | End: 2022-03-08 | Stop reason: HOSPADM

## 2022-03-08 RX ORDER — OXYCODONE HYDROCHLORIDE 5 MG/1
5 TABLET ORAL PRN
Status: DISCONTINUED | OUTPATIENT
Start: 2022-03-08 | End: 2022-03-08 | Stop reason: HOSPADM

## 2022-03-08 RX ORDER — LIDOCAINE HYDROCHLORIDE 20 MG/ML
INJECTION, SOLUTION EPIDURAL; INFILTRATION; INTRACAUDAL; PERINEURAL PRN
Status: DISCONTINUED | OUTPATIENT
Start: 2022-03-08 | End: 2022-03-08 | Stop reason: SDUPTHER

## 2022-03-08 RX ORDER — SODIUM CHLORIDE 0.9 % (FLUSH) 0.9 %
5-40 SYRINGE (ML) INJECTION PRN
Status: DISCONTINUED | OUTPATIENT
Start: 2022-03-08 | End: 2022-03-08 | Stop reason: HOSPADM

## 2022-03-08 RX ORDER — POLYETHYLENE GLYCOL 3350 17 G/17G
17 POWDER, FOR SOLUTION ORAL DAILY
Status: DISCONTINUED | OUTPATIENT
Start: 2022-03-08 | End: 2022-03-09 | Stop reason: HOSPADM

## 2022-03-08 RX ORDER — HYDROCODONE BITARTRATE AND ACETAMINOPHEN 5; 325 MG/1; MG/1
1 TABLET ORAL EVERY 4 HOURS PRN
Status: DISCONTINUED | OUTPATIENT
Start: 2022-03-08 | End: 2022-03-09 | Stop reason: HOSPADM

## 2022-03-08 RX ORDER — FENTANYL CITRATE 50 UG/ML
25 INJECTION, SOLUTION INTRAMUSCULAR; INTRAVENOUS EVERY 5 MIN PRN
Status: DISCONTINUED | OUTPATIENT
Start: 2022-03-08 | End: 2022-03-08 | Stop reason: HOSPADM

## 2022-03-08 RX ORDER — LISINOPRIL 20 MG/1
20 TABLET ORAL DAILY
Status: DISCONTINUED | OUTPATIENT
Start: 2022-03-08 | End: 2022-03-09 | Stop reason: HOSPADM

## 2022-03-08 RX ORDER — ONDANSETRON 2 MG/ML
INJECTION INTRAMUSCULAR; INTRAVENOUS PRN
Status: DISCONTINUED | OUTPATIENT
Start: 2022-03-08 | End: 2022-03-08 | Stop reason: SDUPTHER

## 2022-03-08 RX ORDER — SODIUM CHLORIDE, SODIUM LACTATE, POTASSIUM CHLORIDE, CALCIUM CHLORIDE 600; 310; 30; 20 MG/100ML; MG/100ML; MG/100ML; MG/100ML
INJECTION, SOLUTION INTRAVENOUS CONTINUOUS
Status: DISCONTINUED | OUTPATIENT
Start: 2022-03-08 | End: 2022-03-08

## 2022-03-08 RX ORDER — ONDANSETRON 2 MG/ML
4 INJECTION INTRAMUSCULAR; INTRAVENOUS EVERY 6 HOURS PRN
Status: DISCONTINUED | OUTPATIENT
Start: 2022-03-08 | End: 2022-03-09 | Stop reason: HOSPADM

## 2022-03-08 RX ORDER — ACETAMINOPHEN 500 MG
TABLET ORAL PRN
Status: DISCONTINUED | OUTPATIENT
Start: 2022-03-08 | End: 2022-03-08 | Stop reason: SDUPTHER

## 2022-03-08 RX ORDER — DEXAMETHASONE SODIUM PHOSPHATE 10 MG/ML
INJECTION INTRAMUSCULAR; INTRAVENOUS PRN
Status: DISCONTINUED | OUTPATIENT
Start: 2022-03-08 | End: 2022-03-08 | Stop reason: SDUPTHER

## 2022-03-08 RX ORDER — NEOSTIGMINE METHYLSULFATE 1 MG/ML
INJECTION, SOLUTION INTRAVENOUS PRN
Status: DISCONTINUED | OUTPATIENT
Start: 2022-03-08 | End: 2022-03-08 | Stop reason: SDUPTHER

## 2022-03-08 RX ORDER — SODIUM CHLORIDE 9 MG/ML
25 INJECTION, SOLUTION INTRAVENOUS PRN
Status: DISCONTINUED | OUTPATIENT
Start: 2022-03-08 | End: 2022-03-08 | Stop reason: HOSPADM

## 2022-03-08 RX ORDER — HYDROCODONE BITARTRATE AND ACETAMINOPHEN 5; 325 MG/1; MG/1
1-2 TABLET ORAL EVERY 6 HOURS PRN
Qty: 40 TABLET | Refills: 0 | Status: SHIPPED | OUTPATIENT
Start: 2022-03-08 | End: 2022-03-15

## 2022-03-08 RX ORDER — ONDANSETRON 2 MG/ML
4 INJECTION INTRAMUSCULAR; INTRAVENOUS PRN
Status: DISCONTINUED | OUTPATIENT
Start: 2022-03-08 | End: 2022-03-08 | Stop reason: HOSPADM

## 2022-03-08 RX ORDER — SODIUM CHLORIDE 9 MG/ML
INJECTION, SOLUTION INTRAVENOUS CONTINUOUS
Status: DISCONTINUED | OUTPATIENT
Start: 2022-03-08 | End: 2022-03-09

## 2022-03-08 RX ORDER — MORPHINE SULFATE 4 MG/ML
4 INJECTION, SOLUTION INTRAMUSCULAR; INTRAVENOUS
Status: DISCONTINUED | OUTPATIENT
Start: 2022-03-08 | End: 2022-03-09 | Stop reason: HOSPADM

## 2022-03-08 RX ORDER — PHENYLEPHRINE HYDROCHLORIDE 10 MG/ML
INJECTION INTRAVENOUS PRN
Status: DISCONTINUED | OUTPATIENT
Start: 2022-03-08 | End: 2022-03-08 | Stop reason: SDUPTHER

## 2022-03-08 RX ORDER — CYCLOBENZAPRINE HCL 10 MG
10 TABLET ORAL 3 TIMES DAILY PRN
Status: DISCONTINUED | OUTPATIENT
Start: 2022-03-08 | End: 2022-03-09 | Stop reason: HOSPADM

## 2022-03-08 RX ORDER — PROPOFOL 10 MG/ML
INJECTION, EMULSION INTRAVENOUS PRN
Status: DISCONTINUED | OUTPATIENT
Start: 2022-03-08 | End: 2022-03-08 | Stop reason: SDUPTHER

## 2022-03-08 RX ORDER — KETOROLAC TROMETHAMINE 30 MG/ML
15 INJECTION, SOLUTION INTRAMUSCULAR; INTRAVENOUS EVERY 6 HOURS
Status: DISCONTINUED | OUTPATIENT
Start: 2022-03-08 | End: 2022-03-09 | Stop reason: HOSPADM

## 2022-03-08 RX ORDER — TRANEXAMIC ACID 100 MG/ML
INJECTION, SOLUTION INTRAVENOUS PRN
Status: DISCONTINUED | OUTPATIENT
Start: 2022-03-08 | End: 2022-03-08 | Stop reason: SDUPTHER

## 2022-03-08 RX ORDER — VANCOMYCIN HYDROCHLORIDE 1 G/20ML
INJECTION, POWDER, LYOPHILIZED, FOR SOLUTION INTRAVENOUS PRN
Status: DISCONTINUED | OUTPATIENT
Start: 2022-03-08 | End: 2022-03-08 | Stop reason: HOSPADM

## 2022-03-08 RX ORDER — ASPIRIN 325 MG
325 TABLET ORAL DAILY
Status: DISCONTINUED | OUTPATIENT
Start: 2022-03-09 | End: 2022-03-09 | Stop reason: HOSPADM

## 2022-03-08 RX ORDER — ATORVASTATIN CALCIUM 40 MG/1
40 TABLET, FILM COATED ORAL NIGHTLY
Status: DISCONTINUED | OUTPATIENT
Start: 2022-03-08 | End: 2022-03-09 | Stop reason: HOSPADM

## 2022-03-08 RX ORDER — OXYCODONE HYDROCHLORIDE 5 MG/1
10 TABLET ORAL PRN
Status: DISCONTINUED | OUTPATIENT
Start: 2022-03-08 | End: 2022-03-08 | Stop reason: HOSPADM

## 2022-03-08 RX ORDER — DOCUSATE SODIUM 100 MG/1
100 CAPSULE, LIQUID FILLED ORAL DAILY
Status: DISCONTINUED | OUTPATIENT
Start: 2022-03-08 | End: 2022-03-09 | Stop reason: HOSPADM

## 2022-03-08 RX ORDER — LACTULOSE 10 G/15ML
30 SOLUTION ORAL 3 TIMES DAILY
Status: DISCONTINUED | OUTPATIENT
Start: 2022-03-08 | End: 2022-03-09 | Stop reason: HOSPADM

## 2022-03-08 RX ORDER — CYCLOBENZAPRINE HCL 10 MG
10 TABLET ORAL 3 TIMES DAILY PRN
Qty: 40 TABLET | Refills: 0 | Status: SHIPPED | OUTPATIENT
Start: 2022-03-08 | End: 2022-03-18

## 2022-03-08 RX ORDER — NICOTINE 21 MG/24HR
1 PATCH, TRANSDERMAL 24 HOURS TRANSDERMAL DAILY
Status: DISCONTINUED | OUTPATIENT
Start: 2022-03-08 | End: 2022-03-09 | Stop reason: HOSPADM

## 2022-03-08 RX ORDER — SODIUM CHLORIDE FOR INHALATION 0.9 %
3 VIAL, NEBULIZER (ML) INHALATION
Status: DISCONTINUED | OUTPATIENT
Start: 2022-03-08 | End: 2022-03-09 | Stop reason: HOSPADM

## 2022-03-08 RX ORDER — HYDROCODONE BITARTRATE AND ACETAMINOPHEN 5; 325 MG/1; MG/1
2 TABLET ORAL EVERY 4 HOURS PRN
Status: DISCONTINUED | OUTPATIENT
Start: 2022-03-08 | End: 2022-03-09 | Stop reason: HOSPADM

## 2022-03-08 RX ORDER — MORPHINE SULFATE 2 MG/ML
2 INJECTION, SOLUTION INTRAMUSCULAR; INTRAVENOUS EVERY 5 MIN PRN
Status: DISCONTINUED | OUTPATIENT
Start: 2022-03-08 | End: 2022-03-08 | Stop reason: HOSPADM

## 2022-03-08 RX ORDER — ASPIRIN 325 MG
325 TABLET ORAL DAILY
Qty: 30 TABLET | Refills: 0 | Status: SHIPPED | OUTPATIENT
Start: 2022-03-08 | End: 2022-08-02

## 2022-03-08 RX ORDER — ALBUTEROL SULFATE 2.5 MG/3ML
2.5 SOLUTION RESPIRATORY (INHALATION)
Status: DISCONTINUED | OUTPATIENT
Start: 2022-03-08 | End: 2022-03-08

## 2022-03-08 RX ADMIN — DOCUSATE SODIUM 100 MG: 100 CAPSULE, LIQUID FILLED ORAL at 13:37

## 2022-03-08 RX ADMIN — FENTANYL CITRATE 25 MCG: 50 INJECTION, SOLUTION INTRAMUSCULAR; INTRAVENOUS at 11:55

## 2022-03-08 RX ADMIN — METOPROLOL TARTRATE 25 MG: 25 TABLET, FILM COATED ORAL at 20:33

## 2022-03-08 RX ADMIN — ROCURONIUM BROMIDE 50 MG: 10 INJECTION, SOLUTION INTRAVENOUS at 09:46

## 2022-03-08 RX ADMIN — HYDROMORPHONE HYDROCHLORIDE 0.5 MG: 1 INJECTION, SOLUTION INTRAMUSCULAR; INTRAVENOUS; SUBCUTANEOUS at 11:35

## 2022-03-08 RX ADMIN — CEFAZOLIN SODIUM 1000 MG: 1 INJECTION, POWDER, FOR SOLUTION INTRAMUSCULAR; INTRAVENOUS at 15:59

## 2022-03-08 RX ADMIN — SODIUM CHLORIDE, POTASSIUM CHLORIDE, SODIUM LACTATE AND CALCIUM CHLORIDE: 600; 310; 30; 20 INJECTION, SOLUTION INTRAVENOUS at 11:35

## 2022-03-08 RX ADMIN — TRANEXAMIC ACID 100 MG: 1 INJECTION, SOLUTION INTRAVENOUS at 09:42

## 2022-03-08 RX ADMIN — SODIUM CHLORIDE, POTASSIUM CHLORIDE, SODIUM LACTATE AND CALCIUM CHLORIDE: 600; 310; 30; 20 INJECTION, SOLUTION INTRAVENOUS at 09:23

## 2022-03-08 RX ADMIN — PHENYLEPHRINE HYDROCHLORIDE 300 MCG: 10 INJECTION INTRAVENOUS at 10:00

## 2022-03-08 RX ADMIN — LACTULOSE 30 G: 20 SOLUTION ORAL at 16:01

## 2022-03-08 RX ADMIN — Medication 0.6 MG: at 10:47

## 2022-03-08 RX ADMIN — FENTANYL CITRATE 100 MCG: 50 INJECTION, SOLUTION INTRAMUSCULAR; INTRAVENOUS at 09:37

## 2022-03-08 RX ADMIN — MIDAZOLAM HYDROCHLORIDE 2 MG: 1 INJECTION, SOLUTION INTRAMUSCULAR; INTRAVENOUS at 09:37

## 2022-03-08 RX ADMIN — DEXAMETHASONE SODIUM PHOSPHATE 10 MG: 10 INJECTION INTRAMUSCULAR; INTRAVENOUS at 09:50

## 2022-03-08 RX ADMIN — Medication 3 MG: at 10:47

## 2022-03-08 RX ADMIN — LIDOCAINE HYDROCHLORIDE 100 MG: 20 INJECTION, SOLUTION EPIDURAL; INFILTRATION; INTRACAUDAL; PERINEURAL at 09:46

## 2022-03-08 RX ADMIN — SODIUM CHLORIDE: 9 INJECTION, SOLUTION INTRAVENOUS at 13:35

## 2022-03-08 RX ADMIN — PHENYLEPHRINE HYDROCHLORIDE 300 MCG: 10 INJECTION INTRAVENOUS at 09:56

## 2022-03-08 RX ADMIN — Medication 1 MG: at 10:47

## 2022-03-08 RX ADMIN — KETOROLAC TROMETHAMINE 15 MG: 30 INJECTION, SOLUTION INTRAMUSCULAR; INTRAVENOUS at 20:32

## 2022-03-08 RX ADMIN — Medication 2000 MG: at 09:50

## 2022-03-08 RX ADMIN — ACETAMINOPHEN 500 MG: 500 TABLET, FILM COATED ORAL at 09:25

## 2022-03-08 RX ADMIN — GABAPENTIN 300 MG: 300 CAPSULE ORAL at 09:25

## 2022-03-08 RX ADMIN — Medication 1 MG: at 11:14

## 2022-03-08 RX ADMIN — HYDROCODONE BITARTRATE AND ACETAMINOPHEN 2 TABLET: 5; 325 TABLET ORAL at 17:20

## 2022-03-08 RX ADMIN — ONDANSETRON 4 MG: 2 INJECTION INTRAMUSCULAR; INTRAVENOUS at 09:50

## 2022-03-08 RX ADMIN — POLYETHYLENE GLYCOL 3350 17 G: 17 POWDER, FOR SOLUTION ORAL at 13:30

## 2022-03-08 RX ADMIN — LISINOPRIL 20 MG: 20 TABLET ORAL at 13:37

## 2022-03-08 RX ADMIN — PROPOFOL 150 MG: 10 INJECTION, EMULSION INTRAVENOUS at 09:46

## 2022-03-08 RX ADMIN — SODIUM CHLORIDE, POTASSIUM CHLORIDE, SODIUM LACTATE AND CALCIUM CHLORIDE: 600; 310; 30; 20 INJECTION, SOLUTION INTRAVENOUS at 10:20

## 2022-03-08 RX ADMIN — Medication 1 MG: at 10:53

## 2022-03-08 RX ADMIN — KETOROLAC TROMETHAMINE 15 MG: 30 INJECTION, SOLUTION INTRAMUSCULAR; INTRAVENOUS at 16:00

## 2022-03-08 RX ADMIN — ATORVASTATIN CALCIUM 40 MG: 40 TABLET, FILM COATED ORAL at 20:33

## 2022-03-08 ASSESSMENT — PAIN SCALES - GENERAL
PAINLEVEL_OUTOF10: 4
PAINLEVEL_OUTOF10: 5
PAINLEVEL_OUTOF10: 8
PAINLEVEL_OUTOF10: 4
PAINLEVEL_OUTOF10: 8
PAINLEVEL_OUTOF10: 6
PAINLEVEL_OUTOF10: 4
PAINLEVEL_OUTOF10: 8
PAINLEVEL_OUTOF10: 0
PAINLEVEL_OUTOF10: 8
PAINLEVEL_OUTOF10: 3
PAINLEVEL_OUTOF10: 3

## 2022-03-08 ASSESSMENT — PULMONARY FUNCTION TESTS
PIF_VALUE: 18
PIF_VALUE: 18
PIF_VALUE: 10
PIF_VALUE: 18
PIF_VALUE: 18
PIF_VALUE: 17
PIF_VALUE: 17
PIF_VALUE: 0
PIF_VALUE: 18
PIF_VALUE: 4
PIF_VALUE: 17
PIF_VALUE: 21
PIF_VALUE: 16
PIF_VALUE: 19
PIF_VALUE: 11
PIF_VALUE: 18
PIF_VALUE: 11
PIF_VALUE: 19
PIF_VALUE: 23
PIF_VALUE: 18
PIF_VALUE: 11
PIF_VALUE: 18
PIF_VALUE: 17
PIF_VALUE: 10
PIF_VALUE: 18
PIF_VALUE: 13
PIF_VALUE: 18
PIF_VALUE: 17
PIF_VALUE: 18
PIF_VALUE: 18
PIF_VALUE: 19
PIF_VALUE: 21
PIF_VALUE: 10
PIF_VALUE: 18
PIF_VALUE: 0
PIF_VALUE: 2
PIF_VALUE: 18
PIF_VALUE: 19
PIF_VALUE: 18
PIF_VALUE: 19
PIF_VALUE: 18
PIF_VALUE: 18
PIF_VALUE: 11
PIF_VALUE: 18
PIF_VALUE: 17
PIF_VALUE: 12
PIF_VALUE: 18
PIF_VALUE: 18
PIF_VALUE: 9
PIF_VALUE: 12
PIF_VALUE: 18
PIF_VALUE: 13
PIF_VALUE: 10
PIF_VALUE: 18
PIF_VALUE: 16
PIF_VALUE: 9
PIF_VALUE: 21
PIF_VALUE: 19
PIF_VALUE: 18
PIF_VALUE: 18
PIF_VALUE: 11
PIF_VALUE: 18
PIF_VALUE: 10
PIF_VALUE: 18
PIF_VALUE: 18
PIF_VALUE: 11
PIF_VALUE: 11
PIF_VALUE: 19
PIF_VALUE: 18
PIF_VALUE: 17
PIF_VALUE: 18
PIF_VALUE: 12
PIF_VALUE: 18
PIF_VALUE: 11
PIF_VALUE: 26
PIF_VALUE: 9
PIF_VALUE: 9
PIF_VALUE: 18

## 2022-03-08 ASSESSMENT — PAIN DESCRIPTION - ORIENTATION
ORIENTATION: RIGHT
ORIENTATION: RIGHT
ORIENTATION_2: POSTERIOR
ORIENTATION: RIGHT
ORIENTATION_2: POSTERIOR
ORIENTATION: RIGHT
ORIENTATION_2: POSTERIOR
ORIENTATION_2: POSTERIOR
ORIENTATION: RIGHT

## 2022-03-08 ASSESSMENT — PAIN DESCRIPTION - LOCATION
LOCATION: KNEE
LOCATION_2: ANKLE
LOCATION: KNEE
LOCATION_2: ANKLE

## 2022-03-08 ASSESSMENT — PAIN DESCRIPTION - DESCRIPTORS
DESCRIPTORS: CONSTANT
DESCRIPTORS: DISCOMFORT;DULL
DESCRIPTORS: ACHING;SHARP
DESCRIPTORS: DISCOMFORT;DULL
DESCRIPTORS: DISCOMFORT;DULL

## 2022-03-08 ASSESSMENT — PAIN DESCRIPTION - PAIN TYPE
TYPE: SURGICAL PAIN
TYPE_2: SURGICAL
TYPE: SURGICAL PAIN
TYPE_2: SURGICAL
TYPE: SURGICAL PAIN

## 2022-03-08 ASSESSMENT — PAIN DESCRIPTION - INTENSITY
RATING_2: 3
RATING_2: 8
RATING_2: 4
RATING_2: 8

## 2022-03-08 ASSESSMENT — PAIN - FUNCTIONAL ASSESSMENT: PAIN_FUNCTIONAL_ASSESSMENT: 0-10

## 2022-03-08 NOTE — PROGRESS NOTES
RT Nebulizer Bronchodilator Protocol Note    There is a bronchodilator order in the chart from a provider indicating to follow the RT Bronchodilator Protocol and there is an Initiate RT Bronchodilator Protocol order as well (see protocol at bottom of note). CXR Findings:  No results found. The findings from the last RT Protocol Assessment were as follows:  Smoking:    Respiratory Pattern:    Breath Sounds:    Cough: Indication for Bronchodilator Therapy:    Bronchodilator Assessment Score:      Aerosolized bronchodilator medication orders have been revised according to the RT Nebulizer Bronchodilator Protocol below. Respiratory Therapist to perform RT Therapy Protocol Assessment initially then follow the protocol. Repeat RT Therapy Protocol Assessment PRN for score 0-3 or on second treatment, BID, and PRN for scores above 3. No Indications - adjust the frequency to every 6 hours PRN wheezing or bronchospasm, if no treatments needed after 48 hours then discontinue using Per Protocol order mode. If indication present, adjust the RT bronchodilator orders based on the Bronchodilator Assessment Score as indicated below. If a patient is on this medication at home then do not decrease Frequency below that used at home. 0-3 - enter or revise RT bronchodilator order(s) to equivalent RT Bronchodilator order with Frequency of every 4 hours PRN for wheezing or increased work of breathing using Per Protocol order mode. 4-6 - enter or revise RT Bronchodilator order(s) to two equivalent RT bronchodilator orders with one order with BID Frequency and one order with Frequency of every 4 hours PRN wheezing or increased work of breathing using Per Protocol order mode.          7-10 - enter or revise RT Bronchodilator order(s) to two equivalent RT bronchodilator orders with one order with TID Frequency and one order with Frequency of every 4 hours PRN wheezing or increased work of breathing using Per Protocol order mode. 11-13 - enter or revise RT Bronchodilator order(s) to one equivalent RT bronchodilator order with QID Frequency and an Albuterol order with Frequency of every 4 hours PRN wheezing or increased work of breathing using Per Protocol order mode. Greater than 13 - enter or revise RT Bronchodilator order(s) to one equivalent RT bronchodilator order with every 4 hours Frequency and an Albuterol order with Frequency of every 2 hours PRN wheezing or increased work of breathing using Per Protocol order mode. RT to enter RT Home Evaluation for COPD & MDI Assessment order using Per Protocol order mode.     Electronically signed by Eliz Vincent RCP on 3/8/2022 at 4:47 PM

## 2022-03-08 NOTE — ANESTHESIA PRE PROCEDURE
Department of Anesthesiology  Preprocedure Note       Name:  Oriana East   Age:  63 y.o.  :  1960                                          MRN:  [id number]         Date:  3/8/2022      Surgeon: Gia De Dios):  Gerry Last MD    Procedure: Procedure(s):  Right Total Knee Arthroplasty    Medications prior to admission:   Prior to Admission medications    Medication Sig Start Date End Date Taking? Authorizing Provider   atorvastatin (LIPITOR) 40 MG tablet Take 1 tablet by mouth nightly 21  Yes Robert Thomas DO   Naproxen Sodium (ALEVE) 220 MG CAPS Take 220 mg by mouth as needed for Pain   Yes Historical Provider, MD   lisinopril (PRINIVIL;ZESTRIL) 20 MG tablet Take 1 tablet by mouth once daily 21  Yes Abdulaziz Branham MD   metoprolol tartrate (LOPRESSOR) 25 MG tablet Take 1 tablet by mouth twice daily 3/29/21  Yes Natalie Bella MD   ibuprofen (ADVIL;MOTRIN) 200 MG tablet Take 200 mg by mouth every 6 hours as needed for Pain    Yes Historical Provider, MD   aspirin 81 MG tablet Take 81 mg by mouth daily   Yes Historical Provider, MD   esomeprazole (NEXIUM) 20 MG delayed release capsule Take 20 mg by mouth daily   Yes Historical Provider, MD   calcium carbonate 600 MG TABS tablet Take 1 tablet by mouth daily   Yes Historical Provider, MD   Handicap Placard MISC by Does not apply route Valid for 90 days     s/p left total knee arthroplasty 3/4/19   Gerry Last MD       Current medications:    Current Facility-Administered Medications   Medication Dose Route Frequency Provider Last Rate Last Admin    sodium chloride flush 0.9 % injection 5-40 mL  5-40 mL IntraVENous PRN Gerry Last MD        lactated ringers infusion   IntraVENous Continuous Gerry Last MD        ceFAZolin (ANCEF) 2000 mg in sterile water 20 mL IV syringe  2,000 mg IntraVENous On Call to Scot Livingston MD           Allergies:     Allergies   Allergen Reactions    Ciprofloxacin Itching and Rash       Problem List: Patient Active Problem List   Diagnosis Code    Hearing loss H91.90    GERD (gastroesophageal reflux disease) K21.9    Right knee pain M25.561    HTN (hypertension) I10    Arm pain M79.603    S/P cardiac cath Z98.890    Coronary artery disease involving native coronary artery of native heart without angina pectoris I25.10    Anxiety F41.9    Primary osteoarthritis of left knee M17.12    Osteoarthritis of left knee M17.12    Status post left knee replacement Z96.652    CAD in native artery I25.10       Past Medical History:        Diagnosis Date    Bunion     right    CAD (coronary artery disease)     MI, stents    Diverticul disease small and large intestine, no perforati or abscess     GERD (gastroesophageal reflux disease)     Hammer toe     right    Heart attack ([address] [address] 11/09/2009    Hyperlipidemia     Hypertension     Pericarditis     Positive cardiac stress test     Smoking history        Past Surgical History:        Procedure Laterality Date    CARDIAC CATHETERIZATION  02/25/2022    COLONOSCOPY  01/08/2018    diverticulosis,[id number]    CORONARY ANGIOPLASTY      stents x2    ENDOSCOPY, COLON, DIAGNOSTIC      FOOT SURGERY      JOINT REPLACEMENT Left     knee    KNEE ARTHROSCOPY  7-2006, 1-2014    TOTAL KNEE ARTHROPLASTY Left 2/26/2019    Left Total Knee Arthroplasty and injection of right knee performed by Jatin Springer MD at [address]         Social History:    Social History     Tobacco Use    Smoking status: Current Every Day Smoker     Packs/day: 0.50     Years: 30.00     Pack years: 15.00     Types: Cigarettes    Smokeless tobacco: Never Used   Substance Use Topics    Alcohol use:  Yes     Alcohol/week: 12.0 standard drinks     Types: 12 Standard drinks or equivalent per week     Comment: daily 1 beer                                Ready to quit: Not Answered  Counseling given: Not Answered      Vital Signs (Current): Vitals:    03/08/22 0910   BP: 137/82   Pulse: 67   Resp: 16   Temp: 36.7 °C (98 °F)   TempSrc: Temporal   SpO2: 94%   Weight: 225 lb 9.6 oz (102.3 kg)   Height: 5' 11\" (1.803 m)                                              BP Readings from Last 3 Encounters:   03/08/22 137/82   02/25/22 110/74   02/10/22 (!) 146/86       NPO Status: Time of last liquid consumption: 2130                        Time of last solid consumption: 2130                        Date of last liquid consumption: 03/07/22 (sip this am with pill)                        Date of last solid food consumption: 03/07/22    BMI:   Wt Readings from Last 3 Encounters:   03/08/22 225 lb 9.6 oz (102.3 kg)   02/25/22 232 lb (105.2 kg)   02/10/22 235 lb (106.6 kg)     Body mass index is 31.46 kg/m². CBC:   Lab Results   Component Value Date    WBC 8.7 03/07/2022    RBC 5.05 03/07/2022    HGB 15.7 03/07/2022    HCT 46.6 03/07/2022    MCV 92.3 03/07/2022    RDW 13.8 03/07/2022     03/07/2022       CMP:   Lab Results   Component Value Date     03/05/2021    K 4.9 03/05/2021     03/05/2021    CO2 26 03/05/2021    BUN 15 03/05/2021    CREATININE 0.69 02/25/2022    CREATININE 0.80 03/05/2021    GFRAA >60 03/05/2021    LABGLOM >60 02/25/2022    GLUCOSE 109 03/05/2021    PROT 6.6 03/05/2021    CALCIUM 9.4 03/05/2021    BILITOT 0.82 03/05/2021    ALKPHOS 73 03/05/2021    AST 20 03/05/2021    ALT 17 03/05/2021       POC Tests: No results for input(s): POCGLU, POCNA, POCK, POCCL, POCBUN, POCHEMO, POCHCT in the last 72 hours.     Coags:   Lab Results   Component Value Date    PROTIME 10.4 02/25/2019    INR 1.0 02/25/2019    APTT 26.0 02/25/2019       HCG (If Applicable): No results found for: PREGTESTUR, PREGSERUM, HCG, HCGQUANT     ABGs: No results found for: PHART, PO2ART, GGG7LUA, TGK0PVV, BEART, Z3MMZZMP     Type & Screen (If Applicable):  No results found for: LABABO, LABRH    Drug/Infectious Status (If Applicable):  No results found for: HIV, HEPCAB    COVID-19 Screening (If Applicable):   Lab Results   Component Value Date    COVID19 Not Detected 03/02/2022           Anesthesia Evaluation  Patient summary reviewed and Nursing notes reviewed no history of anesthetic complications:   Airway: Mallampati: II  TM distance: >3 FB   Neck ROM: full  Mouth opening: > = 3 FB Dental: normal exam         Pulmonary:Negative Pulmonary ROS breath sounds clear to auscultation                             Cardiovascular:  Exercise tolerance: good (>4 METS),   (+) hypertension:, past MI:, CAD:, CABG/stent:,       ECG reviewed  Rhythm: regular  Rate: normal    Stress test reviewed  Cleared by cardiology     Beta Blocker:  Dose within 24 Hrs         Neuro/Psych:   Negative Neuro/Psych ROS              GI/Hepatic/Renal:   (+) GERD: well controlled,           Endo/Other: Negative Endo/Other ROS                    Abdominal:             Vascular: negative vascular ROS. Other Findings:             Anesthesia Plan      general     ASA 3       Induction: intravenous. MIPS: Postoperative opioids intended and Prophylactic antiemetics administered. Anesthetic plan and risks discussed with patient.       Plan discussed with surgical team.                  PAULA Mcghee - CRNA   3/8/2022

## 2022-03-08 NOTE — PROGRESS NOTES
Physical Therapy    Facility/Department: [facility]  Initial Assessment    NAME: Hakan Whitaker  : 1960  MRN: [id number]    Date of Service: 3/8/2022    Discharge Recommendations:  Home with Home health PT,Outpatient PT        Assessment   Body structures, Functions, Activity limitations: Decreased functional mobility ; Decreased balance;Decreased ROM; Decreased strength  Prognosis: Good  Decision Making: Low Complexity  REQUIRES PT FOLLOW UP: Yes  Activity Tolerance  Activity Tolerance: Patient Tolerated treatment well       Patient Diagnosis(es): The encounter diagnosis was Status post right knee replacement. has a past medical history of Bunion, CAD (coronary artery disease), Diverticul disease small and large intestine, no perforati or abscess, GERD (gastroesophageal reflux disease), Hammer toe, Heart attack ([facility] [address] Hyperlipidemia, Hypertension, Pericarditis, Positive cardiac stress test, and Smoking history. has a past surgical history that includes Upper gastrointestinal endoscopy; Knee arthroscopy (, -); Foot surgery; Colonoscopy (2018); Total knee arthroplasty (Left, 2019); Cardiac catheterization (2022); Coronary angioplasty; joint replacement (Left); and Endoscopy, colon, diagnostic.     Restrictions     Vision/Hearing        Subjective  General  Chart Reviewed: Yes  Patient assessed for rehabilitation services?: Yes  Response To Previous Treatment: Not applicable  Family / Caregiver Present: No  Follows Commands: Within Functional Limits  Pain Screening  Patient Currently in Pain: Yes  Pain Assessment  Pain Assessment: 0-10  Pain Level: 8  Pain Type: Surgical pain  Pain Location: Knee  Pain Orientation: Right  Vital Signs  Patient Currently in Pain: Yes       Orientation  Orientation  Overall Orientation Status: Within Normal Limits  Social/Functional History  Social/Functional History  Lives With: Spouse  Type of Home: House  Home Layout: One level  ADL Assistance: Independent  Homemaking Assistance: Independent  Ambulation Assistance: Independent  Transfer Assistance: Independent  Active : Yes  Occupation: Full time employment  Type of occupation: [employer]        Objective     Observation/Palpation  Observation: In bed, awake & alert    PROM RLE (degrees)  R Knee Flexion 0-145: 75  R Knee Extension 0: -2  Strength RLE  R Knee Flexion: 3+/5  R Knee Extension: 3/5  R Ankle Dorsiflexion: 5/5        Bed mobility  Supine to Sit: Supervision  Sit to Supine: Supervision  Scooting: Supervision  Transfers  Sit to Stand: Contact guard assistance  Stand to sit: Stand by assistance  Ambulation  Ambulation?: Yes  WB Status: WBAT R  Ambulation 1  Surface: level tile  Device: Rolling Walker  Assistance: Contact guard assistance  Gait Deviations: Slow Zaynab  Distance: 15 ft     Balance  Sitting - Static: Good  Sitting - Dynamic: Good  Standing - Static: Good  Standing - Dynamic: Fair  Exercises  Hamstring Sets: 5  Quad Sets: 5  Heelslides: 5  Ankle Pumps: 10     Plan   Plan  Times per day: Twice a day  Current Treatment Recommendations: Gait Training,Transfer Training,ROM  Safety Devices  Type of devices: Left in bed,Call light within reach    G-Code       OutComes Score                                                  AM-PAC Score             Goals  Short term goals  Time Frame for Short term goals: 1 day  Short term goal 1: Assess functional status  Long term goals  Time Frame for Long term goals : 3 days  Long term goal 1: Transfer SBA  Long term goal 2: Gait with RW SBA 50 ft       Therapy Time   Individual Concurrent Group Co-treatment   Time In 1440         Time Out 1502         Minutes 22                 Tony Hampton, PT

## 2022-03-08 NOTE — PROGRESS NOTES
Occupational Therapy   Occupational Therapy Initial Assessment  Date: 3/8/2022   Patient Name: Johnie Starkey  MRN: [id number]     : 1960    Date of Service: 3/8/2022    Discharge Recommendations:  Home with assist PRN       Assessment   Performance deficits / Impairments: Decreased functional mobility ; Decreased ADL status; Decreased ROM; Decreased endurance;Decreased balance;Decreased high-level IADLs  Treatment Diagnosis: RTKA  Prognosis: Good  Decision Making: Low Complexity  REQUIRES OT FOLLOW UP: Yes  Safety Devices  Safety Devices in place: Yes  Type of devices: Left in bed;Call light within reach           Patient Diagnosis(es): The encounter diagnosis was Status post right knee replacement. has a past medical history of Bunion, CAD (coronary artery disease), Diverticul disease small and large intestine, no perforati or abscess, GERD (gastroesophageal reflux disease), Hammer toe, Heart attack ([address] Hyperlipidemia, Hypertension, Pericarditis, Positive cardiac stress test, and Smoking history. has a past surgical history that includes Upper gastrointestinal endoscopy; Knee arthroscopy (-, -); Foot surgery; Colonoscopy (2018); Total knee arthroplasty (Left, 2019); Cardiac catheterization (2022); Coronary angioplasty; joint replacement (Left); and Endoscopy, colon, diagnostic.     Treatment Diagnosis: RTKA      Restrictions  Restrictions/Precautions  Implants present? : Metal implants (knee)    Subjective   General  Chart Reviewed: Yes  Patient assessed for rehabilitation services?: Yes  Family / Caregiver Present: No  Referring Practitioner: Miriam Clark  Diagnosis: RTKA  Subjective  Subjective: Patient rec'd in bed, pleasant and cooperative 63 yr old male with RTKA  Patient Currently in Pain: Yes  Pain Assessment  Pain Assessment: 0-10  Pain Level: 8  Pain Type: Surgical pain  Pain Location: Knee  Pain Orientation: Right  Vital Signs  Temp: 98 °F (36.7 °C)  Temp Source: Tympanic  Pulse: 74  Heart Rate Source: Monitor  Resp: 16  BP: 131/86  BP Location: Left upper arm  MAP (mmHg): 101  Patient Position: High fowlers  Patient Currently in Pain: Yes  Oxygen Therapy  SpO2: 93 %  O2 Device: Nasal cannula  O2 Flow Rate (L/min): 2 L/min     Social/Functional History  Social/Functional History  Lives With: Spouse  Type of Home: House  Home Layout: One level  Home Access: Stairs to enter without rails  Entrance Stairs - Number of Steps: 3  Bathroom Shower/Tub: Walk-in shower  Bathroom Toilet: Standard  Home Equipment: Rolling walker  ADL Assistance: Independent  Homemaking Assistance: Independent  Homemaking Responsibilities: Yes  Meal Prep Responsibility: Secondary  Laundry Responsibility: Secondary  Cleaning Responsibility: Secondary  Shopping Responsibility: Secondary  Other (Comment): [place]  Ambulation Assistance: Independent  Transfer Assistance: Independent  Active : Yes  Mode of Transportation: Truck  Occupation: Full time employment  Type of occupation: [employer]       Objective   Vision: Within Functional Limits  Hearing: Within functional limits    Orientation  Overall Orientation Status: Within Normal Limits  Observation/Palpation  Observation: In bed, awake & alert     ADL  LE Dressing:  Moderate assistance  Toileting: Contact guard assistance        Bed mobility  Supine to Sit: Supervision  Sit to Supine: Supervision  Scooting: Supervision  Transfers  Sit to stand: Contact guard assistance  Stand to sit: Stand by assistance     Cognition  Overall Cognitive Status: WNL      LUE AROM (degrees)  LUE AROM : WFL  Left Hand AROM (degrees)  Left Hand AROM: WFL  RUE AROM (degrees)  RUE AROM : WFL  Right Hand AROM (degrees)  Right Hand AROM: WFL  LUE Strength  Gross LUE Strength: WFL  RUE Strength  Gross RUE Strength: WFL        Plan   Plan  Times per week: 1-2  Current Treatment Recommendations: Patient/Caregiver Education & Training,Equipment Evaluation, Education, & procurement,Self-Care / ADL,Functional Mobility Training      Goals  Short term goals  Time Frame for Short term goals: duration of hospital stay  Short term goal 1: Patient to complete toilet transfer and toileting tasks with mod I/S using a/e as needed  Short term goal 2: Patient to complete LB dressing with mod I/S using a/e as needed       Therapy Time   Individual Concurrent Group Co-treatment   Time In 1431         Time Out 1456         Minutes 25         Timed Code Treatment Minutes: 0 Minutes       MALLY Lynch, OT

## 2022-03-08 NOTE — ANESTHESIA POSTPROCEDURE EVALUATION
Department of Anesthesiology  Postprocedure Note    Patient: Zuly Villasenor  MRN: [id number]  [place]: 1960  Date of evaluation: 3/8/2022  Time:  11:53 AM     Procedure Summary     Date: 03/08/22 Room / Location: [address] [place] [address]    Anesthesia Start: 9951 Anesthesia Stop: 6390    Procedure: Right Total Knee Arthroplasty (Right ) Diagnosis: (osteoarthritis)    Surgeons: Liza Cartagena MD Responsible Provider: PAULA Ro CRNA    Anesthesia Type: general ASA Status: 3          Anesthesia Type: general    Alethea Phase I: Alethea Score: 10    Alethea Phase II:      Last vitals: Reviewed and per EMR flowsheets.        Anesthesia Post Evaluation    Patient location during evaluation: PACU  Patient participation: complete - patient participated  Level of consciousness: awake and alert  Pain score: 2  Airway patency: patent  Nausea & Vomiting: no nausea and no vomiting  Complications: no  Cardiovascular status: blood pressure returned to baseline and hemodynamically stable  Respiratory status: acceptable, spontaneous ventilation and room air  Hydration status: euvolemic

## 2022-03-08 NOTE — FLOWSHEET NOTE
rounding in PCU. Assessment:  doing follow up on patient seen in OR earlier in the day. Intervention: Engaged in conversation and active listening. Patient started out talking about non spiritual things but conversation then turned to more spiritual things. I was a good conversation for both the patient and the . Outcome: Patient expressed appreciation for visit and offer of continued prayer. Plan: Chaplains are available on site or on call 24/7 for spiritual and emotional support. 03/08/22 9135   Encounter Summary   Services provided to: Patient   Referral/Consult From: Rounding   Continue Visiting   (3-8-22)   Complexity of Encounter Low   Length of Encounter 45 minutes   Spiritual/Cheondoism   Type Spiritual support   Assessment Approachable; Hopeful   Intervention Active listening   Outcome Engaged in conversation;Expressed gratitude     Electronically signed by Jt Bethea on 3/8/2022 at 3:56 PM

## 2022-03-08 NOTE — PROGRESS NOTES
Hospitalist Progress Note    Patient:  Aleah Garces     YOB: 1960    MRN: [id number]   Admit date: 3/8/2022     Acct: [de-identified]     PCP: Luisa King MD    CC--Interval History:    POD 0 right TKA---3.8.2022---[facility]    ASCVD---chronic systolic CHF----extensive history as set forth below    HTN    Tobacco abuse---cessation encouraged----nicotine patch    Patient szno-kifwulgiku-jxesuyek-available records reviewed, including, but not limited to,  OR reports--labs--imaging--EKGs---office records---personal notes    See note below     All other ROS negative except noted in HPI    Diet:  ADULT DIET;  Regular    Medications:  Scheduled Meds:   atorvastatin  40 mg Oral Nightly    [START ON 3/9/2022] pantoprazole  40 mg Oral QAM AC    lisinopril  20 mg Oral Daily    metoprolol tartrate  25 mg Oral BID    ketorolac  15 mg IntraVENous Q6H    docusate sodium  100 mg Oral Daily    [START ON 3/9/2022] aspirin  325 mg Oral Daily    polyethylene glycol  17 g Oral Daily    ceFAZolin  1,000 mg IntraVENous Q8H     Continuous Infusions:   sodium chloride       PRN Meds:cyclobenzaprine, ondansetron, morphine **OR** morphine, HYDROcodone 5 mg - acetaminophen **OR** HYDROcodone 5 mg - acetaminophen, magnesium hydroxide, acetaminophen    Objective:  Labs:  CBC with Differential:    Lab Results   Component Value Date    WBC 8.7 03/07/2022    RBC 5.05 03/07/2022    HGB 15.7 03/07/2022    HCT 46.6 03/07/2022     03/07/2022    MCV 92.3 03/07/2022    MCH 31.1 03/07/2022    MCHC 33.7 03/07/2022    RDW 13.8 03/07/2022    LYMPHOPCT 24 03/07/2022    MONOPCT 13 03/07/2022    BASOPCT 0 03/07/2022    MONOSABS 1.10 03/07/2022    LYMPHSABS 2.09 03/07/2022    EOSABS 0.19 03/07/2022    BASOSABS 0.03 03/07/2022    DIFFTYPE NOT REPORTED 02/07/2022     BMP:    Lab Results   Component Value Date     03/05/2021    K 4.9 03/05/2021     03/05/2021    CO2 26 03/05/2021    BUN 15 03/05/2021    LABALBU 4.0 03/05/2021    CREATININE 0.69 02/25/2022    CREATININE 0.80 03/05/2021    CALCIUM 9.4 03/05/2021    GFRAA >60 03/05/2021    LABGLOM >60 02/25/2022    GLUCOSE 109 03/05/2021           Physical Exam:  Vitals: /75   Pulse 67   Temp 97.4 °F (36.3 °C) (Tympanic)   Resp 16   Ht 5' 11\" (1.803 m)   Wt 225 lb 9.6 oz (102.3 kg)   SpO2 92%   BMI 31.46 kg/m²   24 hour intake/output:    Intake/Output Summary (Last 24 hours) at 3/8/2022 1333  Last data filed at 3/8/2022 1109  Gross per 24 hour   Intake 1500 ml   Output 200 ml   Net 1300 ml     Last 3 weights: Wt Readings from Last 3 Encounters:   03/08/22 225 lb 9.6 oz (102.3 kg)   02/25/22 232 lb (105.2 kg)   02/10/22 235 lb (106.6 kg)     HEENT: O2 NC----, Normocephalic and Atraumatic  Neck: Supple, No Masses, Tenderness, Nodularity and No Lymphadenopathy  Chest/Lungs: Clear to Auscultation without Rales, Rhonchi, or Wheezes and Distant Breath Sounds  Cardiac: Regular Rate and Rhythm  GI/Abdomen: Bowel Sounds Present and Soft, Non-tender, without Guarding or Rebound Tenderness  : Not examined  EXT/Skin: sp TKA---dressing CDI----can wiggle toes----light tough intac, but for sites of prior bunionectomy----LLE-, No Edema, No Cyanosis and No Clubbing  Neuro: but for gait-balance instability----sp right TKA---- and No Localizing Signs/Symptoms      Assessment:    Principal Problem: Total knee replacement status, right  Resolved Problems:    * No resolved hospital problems. *    ERINN ORTIZ           61  WM  [axel Bateman, ;   gasper Mota, Orthoped[facility]]  FULL CODE     SCDs    ASPIRIN  COVID-19--NEGATIVE----3.8.2022---J&J--12.4.2021---3.6.2021    Anti-infectives:    Ancef IV x 3 doses    POD   _____   right TKA---3.8.2022  Primary osteoarthritis right knee---chronic right knee pain    ASCVD       Cardiac catheterization----2.25.2022---0% LM---30-40% mid-LAD----patent D1 stent--                                 large patent D2--10-20% LCx---patent mid LCx stent--100% chronic                                 occlusion RCA with good left to right collaterals---LVEF ~ 60%--            Nuclear stress---2.15.2022--normal EKG portion-- inferior infarction with moderate size                                  ariel-infarct ischemia--normal WM--DD---LVEF ~ 57%  -       EKG----2.10.2022----SR--85--1st degree AVB       Nuclear stress---2. 6.2019--severe resting LV dysfunction--EF ~ 35-49%--                                [id number]-fb-[id number] inferior wall infarction--per-infarct ischemia--                                decreased LVEF ~ 46%--down from 49%--no change from prior                                 studies       Cardiac catheterization--5. 9.2016---PTCA--LIBBY D1 and LIBBY circumflex---                                 99% D1--75% mid-circumflex       Cardiac catheterization---12. 3.2015--chronic 100% occlusion RCA with left-to-right collaterals--                                  NLVSF--0% LM--70% D1-----diffuse  irregularities 40-50% LAD--                                  40-50% left circumflex---medical management       Positive cardiac stress test---2015       MI--11.9.2009       Pericarditis     CHF----chronic systolic  Anemia--iron deficiency  Anemia--expected acute blood loss--surgery  Hypertension  CKD----Stage 2  GERD  Tobacco abuse--current--daily  ETOH usage   HEARING IMPAIRMENT  PMH:    right knee pain--OA,  right bunion---hammer toe, diverticular disease--colon, hyponatremia   PSH:     EGD, left arthroscopy---2006--2014, right bunionectomy,                 colonoscopy--2018--diverticulosis--polyp, left TKA----2.26.2018---[facility]    Allergies:   Cipro---rash      Plan:  1. Home medications reviewed  2. Respiratory regimen  3. Bowel regimen  4. Tobacco abuse---cessation recommended----nicotine patch---education  5.     See orders    Electronically signed by Meyl Sumner on 3/8/2022 at 1:33 PM    Hospitalist

## 2022-03-08 NOTE — PROGRESS NOTES
Incentive Spirometry education and demonstration given by Respiratory Therapy. Pt achieving 2000 mL at time of instruction. Incentive Spirometer left at bedside and   Patient instructed to do a minimum of 10 breaths every hour.       Christel Landers RCP  4:46 PM

## 2022-03-08 NOTE — FLOWSHEET NOTE
rounding in [address] [place] [address]. Assessment: Patient was awaiting surgery. Patient has good family and Roman Catholic support. Intervention: Engaged in conversation and active listening. Prayed with Patient. Outcome: Patient expressed appreciation for visit and offer of continued prayer. Plan: Chaplains are available on site or on call 24/7 for spiritual and emotional support. 03/08/22 1008   Encounter Summary   Services provided to: Patient   Referral/Consult From: Rounding   Support System Spouse; Family members   Continue Visiting   (3-8-22)   Complexity of Encounter Low   Length of Encounter 15 minutes   Spiritual Assessment Completed Yes   Spiritual/Hinduism   Type Spiritual support   Assessment Calm; Approachable; Hopeful   Intervention Active listening;Prayer   Outcome Engaged in conversation;Expressed gratitude     Electronically signed by Magdaleno Villanueva on 3/8/2022 at 10:10 AM

## 2022-03-08 NOTE — OP NOTE
Operative Note      Patient: Wendy Plasencia  YOB: 1960  MRN: [id number]    Date of Procedure: 3/8/2022    Pre-Op Diagnosis: Right knee osteoarthritis    Post-Op Diagnosis: Same       Procedure(s):  Right Total Knee Arthroplasty    Surgeon(s):  Esperanza Herrera MD    Assistant:   Physician Assistant: Abelardo Edge PA-C    Anesthesia: General    Estimated Blood Loss (mL): 219     Complications: None    Specimens:   * No specimens in log *    Implants:  Implant Name Type Inv. Item Serial No.  Lot No. LRB No. Used Action   CEMENT BNE 40GM FULL DOSE PMMA W/O ANTIBIO M VISC RADPQ - [id number]  CEMENT BNE 40GM FULL DOSE PMMA W/O ANTIBIO M VISC RADPQ  [employer] [facility] [id number] Right 1 Implanted   COMPONENT FEM CR 4.5 RT KNEE STEPHANY TRULIANT - [id number]  COMPONENT FEM CR 4.5 RT KNEE STEPHANY TRULIANT [id number] [employer]  Right 1 Implanted   INSERT TIB SZ 4.5F/3.5T STEPHANY FIT TRULIANT - [id number]  INSERT TIB SZ 4.5F/3.5T STEPHANY FIT TRULIANT [id number] [employer]  Right 1 Implanted   COMPONENT PAT 29MM BETO 6MM THCK 3 PEG 3 RND KIRSTY STD STEPHANY NP - [id number]  COMPONENT PAT 29MM BETO 6MM THCK 3 PEG 3 RND KIRSTY STD STEPHANY NP [id number] [employer]  Right 1 Implanted   INSERT TIB CR 4.5 9 MM TRULIANT - [id number]  INSERT TIB CR 4.5 9 MM TRULIANT [id number] [employer]  Right 1 Implanted         Drains: * No LDAs found *    Findings: confirmed    Detailed Description of Procedure:        INDICATIONS: The patient is a 63y.o.-year-old with Right  knee pain for quite sometime. It affects the patient's day-to-day activities. The patient occasionally has to use assist devices to get up from a seated position. Pain is localized along the joint line. The patient has tried activity modification. The patient has had pain medications. The patient has had corticosteroid injections as well.   There has been attempts with physical therapy and have not relieved the symptoms The physical exam elicits  pain along the joint line and crepitus with range of motion. Range of motion is slightly decrease. The x-rays showed significant osteoarthritis. Discussed the option of total knee replacement. We have elected to proceed. NARRATIVE: The patient was taken to the operating room, underwent a general anesthetic. The Right  lower extremity was prepped and draped in normal sterile fashion. Timeout was taken. Consent was confirmed. Ancef 2 grams was infused. We started with a midline incision with skin knife followed by electrocautery down to the extensor mechanism. We performed a medial parapatellar arthrotomy. Subluxed the patella laterally. We removed the patellar fat pad and removed the remaining meniscus. We performed appropriate elevation up the medial complex. We then placed the distal femoral cutting guide in position, made our distal femoral cut taking off 11 mm secondary to a slight flexion contracture. We placed the distal femoral sizing guide selected to a size 4.5. The 4.5 cutting guide was put into position and made our anterior, posterior and chamfer cuts. We then addressed the tibia, subluxed it anterior and removed soft tissues. We then used the intermedullary guide and took about 2 mm off the most effected side. It was sized to be a size 3.5, reamed and broached. We looked for posterior osteophytes on the femur. Those were removed. We then placed trial tibial and femoral components with a 9 mm bearing surface. With a full extension it was stable. We resurfaced the patella, taking about 8 mm of patella. We removed the lateral facet,drilled and placed a size 29 patellar button. It tracked appropriately. We removed all trial implants. Knee was thoroughly irrigated. Injected the capsule and the periosteum with Marcaine with epinephrine, Toradol, Morphine for postop pain control as well as hemostasis. We then cemented in tibial, femoral and patellar components with a 9 mm bearing surface.  Once cement had cured we removed all loose cement. We then placed the implantable 9 mm bearing surface. One Gram of Vancomycin powder was placed in the joint. We repaired the arthrotomy with large absorbable suture. We injected the capsule and the knee with 1 gm of tranexamic acid. We repaired the skin with 0 Quill, Prineo/Dermabond and dry dressings. The patient was then awakened and returned to recovery room in good condition. POSTOPERATIVE PLAN: Weightbearing as tolerated, total knee arthroplasty protocol. First postop visit will be a clinical exam, no x-rays, and be in 6 weeks.     Gary Rosales MD                       Electronically signed by Gary Rosales MD on 3/8/2022 at 10:47 AM

## 2022-03-08 NOTE — H&P
History and Physical        CHIEF COMPLAINT:  Right knee pain    HISTORY OF PRESENT ILLNESS:      The patient is a 63 y.o. male  who presents with a long history of right knee pain. He has had x-rays in the past and diagnosed with primary osteoarthritis of the right knee. He has had multiple corticosteroid injection in the right knee in the past.  He states last 1 was at least 3 months ago. He currently takes naproxen on a regular basis. Pain in the right knee remains sharp localized anterior medial aspect knee worse with weightbearing worse with activity relieved with rest.  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 After examining the patient and reviewing the x-rays we feel as though he best treated with right total knee replacement. Benefits versus risks were explained consent was obtained we will proceed as outlined.     Past Medical History:    Past Medical History:   Diagnosis Date    Bunion     right    CAD (coronary artery disease)     MI, stents    Diverticul disease small and large intestine, no perforati or abscess     GERD (gastroesophageal reflux disease)     Hammer toe     right    Heart attack ([address] 11/09/2009    Hyperlipidemia     Hypertension     Pericarditis     Positive cardiac stress test     Smoking history        Past Surgical History:    Past Surgical History:   Procedure Laterality Date    CARDIAC CATHETERIZATION  02/25/2022    COLONOSCOPY  01/08/2018    diverticulosis,[id number]    CORONARY ANGIOPLASTY      stents x2    ENDOSCOPY, COLON, DIAGNOSTIC      FOOT SURGERY      JOINT REPLACEMENT Left     knee    KNEE ARTHROSCOPY  7-2006, 1-2014    TOTAL KNEE ARTHROPLASTY Left 2/26/2019    Left Total Knee Arthroplasty and injection of right knee performed by Silver Browning MD at  [facility] ENDOSCOPY         Medications Prior to Admission:   Prior to Admission medications Medication Sig Start Date End Date Taking? Authorizing Provider   atorvastatin (LIPITOR) 40 MG tablet Take 1 tablet by mouth nightly 9/20/21   Robert Thomas DO   Naproxen Sodium (ALEVE) 220 MG CAPS Take 220 mg by mouth as needed for Pain    Historical Provider, MD   lisinopril (PRINIVIL;ZESTRIL) 20 MG tablet Take 1 tablet by mouth once daily 6/24/21   Luisa King MD   metoprolol tartrate (LOPRESSOR) 25 MG tablet Take 1 tablet by mouth twice daily 3/29/21   Ivana George MD   ibuprofen (ADVIL;MOTRIN) 200 MG tablet Take 200 mg by mouth every 6 hours as needed for Pain     Historical Provider, MD   aspirin 81 MG tablet Take 81 mg by mouth daily    Historical Provider, MD   Handicap Placard MISC by Does not apply route Valid for 90 days     s/p left total knee arthroplasty 3/4/19   Lucas Schneider MD   esomeprazole (NEXIUM) 20 MG delayed release capsule Take 20 mg by mouth daily    Historical Provider, MD   calcium carbonate 600 MG TABS tablet Take 1 tablet by mouth daily    Historical Provider, MD    Scheduled Meds:   ceFAZolin (ANCEF) IVPB  2,000 mg IntraVENous On Call to OR     Continuous Infusions:   lactated ringers       PRN Meds:.sodium chloride flush    Allergies:  Ciprofloxacin    Social History:   Social History     Socioeconomic History    Marital status:      Spouse name: Not on file    Number of children: Not on file    Years of education: Not on file    Highest education level: Not on file   Occupational History    Not on file   Tobacco Use    Smoking status: Current Every Day Smoker     Packs/day: 0.50     Years: 30.00     Pack years: 15.00     Types: Cigarettes    Smokeless tobacco: Never Used   Vaping Use    Vaping Use: Never used   Substance and Sexual Activity    Alcohol use:  Yes     Alcohol/week: 12.0 standard drinks     Types: 12 Standard drinks or equivalent per week     Comment: daily 1 beer    Drug use: No    Sexual activity: Not on file   Other Topics Concern    Not on file   Social History Narrative    ** Merged History Encounter **          Social Determinants of Health     Financial Resource Strain: Low Risk     Difficulty of Paying Living Expenses: Not hard at all   Food Insecurity: No Food Insecurity    Worried About Running Out of Food in the Last Year: Never true    Ирина of Food in the Last Year: Never true   Transportation Needs:     Lack of Transportation (Medical): Not on file    Lack of Transportation (Non-Medical):  Not on file   Physical Activity:     Days of Exercise per Week: Not on file    Minutes of Exercise per Session: Not on file   Stress:     Feeling of Stress : Not on file   Social Connections:     Frequency of Communication with Friends and Family: Not on file    Frequency of Social Gatherings with Friends and Family: Not on file    Attends Holiness Services: Not on file    Active Member of [address] [place] [address] [employer] or Organizations: Not on file    Attends Club or Organization Meetings: Not on file    Marital Status: Not on file   Intimate Partner Violence:     Fear of Current or Ex-Partner: Not on file    Emotionally Abused: Not on file    Physically Abused: Not on file    Sexually Abused: Not on file   Housing Stability:     Unable to Pay for Housing in the Last Year: Not on file    Number of [place] in the Last Year: Not on file    Unstable Housing in the Last Year: Not on file     Social History     Tobacco Use   Smoking Status Current Every Day Smoker    Packs/day: 0.50    Years: 30.00    Pack years: 15.00    Types: Cigarettes   Smokeless Tobacco Never Used     Social History     Substance and Sexual Activity   Alcohol Use Yes    Alcohol/week: 12.0 standard drinks    Types: 12 Standard drinks or equivalent per week    Comment: daily 1 beer     Social History     Substance and Sexual Activity   Drug Use No       Family History:  Family History   Problem Relation Age of Onset    Diabetes Mother     Early Death Brother 16        MVA  Early Death Sister 46        blood clot    Heart Disease Other        REVIEW OF SYSTEMS:  Constitutional: Denies any fever, chills. Derm: Denies any rash or skin color change. Eyes: Denies blurred or decreased in vision. Ent: Denies any tinnitus or vertigo. Resp: Denies any cough or shortness of breath. CV: Denies any syncope, palpitations or chest pain. GI:  Denies any abdominal pain, nausea, vomiting, constipation or diarrhea. : Denies any hematuria, hesitancy, or dysuria. Heme/Lymph: Denies any bleeding. Musculoskeletal: Positive for left total knee replacement in the past as well as right knee osteoarthritis. Neuro: Denies any dizziness, paresthesia or weakness. PHYSICAL EXAM:  No data found. General appearance:  Alert and oriented x 3. No apparent distress, appears stated age and cooperative. HEENT:  Normal cephalic, atraumatic without obvious deformity. Pupils equal, round, and reactive to light. Conjunctivae/corneas clear. Neck: Supple, with full range of motion. Trachea midline. Respiratory:  Normal respiratory effort. No audible Wheezes or Rhonchi. Cardiovascular:  Regular rate and rhythm. Abdomen: Soft, non-tender, non-distended. Musculoskeletal:   Right knee was inspected skin is good repair no erythema no increased warmth no cellulitis small joint effusion noted. Knee motions full extension flexion is to 110 degrees. Significant crepitus noted throughout range of motion. He has good overall stability varus and valgus anterior posterior stresses. He has no calf pain. He is neurovascular intact the foot. Skin: Skin color, texture, turgor normal.  No rashes or lesions. Neurologic:  Neurovascularly intact without any focal sensory/motor deficits. Sensation intact.        DATA:  CBC:   Lab Results   Component Value Date    WBC 8.7 03/07/2022    HGB 15.7 03/07/2022     03/07/2022     BMP:    Lab Results   Component Value Date     03/05/2021    K 4.9 03/05/2021  03/05/2021    CO2 26 03/05/2021    BUN 15 03/05/2021    CREATININE 0.69 02/25/2022    CREATININE 0.80 03/05/2021    CALCIUM 9.4 03/05/2021    GLUCOSE 109 03/05/2021     PT/INR:    Lab Results   Component Value Date    PROTIME 10.4 02/25/2019    INR 1.0 02/25/2019     Troponin:  No results found for: TROPONINI  No results for input(s): LIPASE, AMYLASE in the last 72 hours. No results for input(s): AST, ALT, BILIDIR, BILITOT, ALKPHOS in the last 72 hours. Radiology:  NM MYOCARDIAL SPECT REST EXERCISE OR RX    Result Date: 2/15/2022  Radiology exam is complete. No Radiologist dictation. Please follow up with ordering provider. ASSESSMENT:Active Problems:    * No active hospital problems. *  Resolved Problems:    * No resolved hospital problems. *   Primary osteoarthritis right knee    PLAN as discussed with Dr. Jp Levi:  1. Surgery for R TKA    The patient was counseled at length about the risks of bridgette Covid-19 during their perioperative period and any recovery window from their procedure. The patient was made aware that bridgette Covid-19  may worsen their prognosis for recovering from their procedure  and lend to a higher morbidity and/or mortality risk. All material risks, benefits, and reasonable alternatives including postponing the procedure were discussed. The patient does wish to proceed with the procedure at this time.          Electronically signed by PAULA Springer CNP on 3/8/2022 at 8:53 AM

## 2022-03-08 NOTE — PLAN OF CARE
Problem: Pain:  Goal: Pain level will decrease  Description: Pain level will decrease  Outcome: Ongoing     Problem: Pain:  Goal: Control of acute pain  Description: Control of acute pain  Outcome: Ongoing     Problem: Activity:  Goal: Ability to return to normal activity level will improve  Description: Ability to return to normal activity level will improve  Outcome: Ongoing     Problem:  Bowel/Gastric:  Goal: Gastrointestinal status for postoperative course will improve  Description: Gastrointestinal status for postoperative course will improve  Outcome: Ongoing

## 2022-03-09 VITALS
SYSTOLIC BLOOD PRESSURE: 145 MMHG | DIASTOLIC BLOOD PRESSURE: 70 MMHG | BODY MASS INDEX: 31.58 KG/M2 | RESPIRATION RATE: 15 BRPM | OXYGEN SATURATION: 94 % | HEART RATE: 66 BPM | WEIGHT: 225.6 LBS | TEMPERATURE: 98.4 F | HEIGHT: 71 IN

## 2022-03-09 LAB
ABSOLUTE EOS #: <0.03 K/UL (ref 0–0.44)
ABSOLUTE IMMATURE GRANULOCYTE: 0.08 K/UL (ref 0–0.3)
ABSOLUTE LYMPH #: 1.7 K/UL (ref 1.1–3.7)
ABSOLUTE MONO #: 1.4 K/UL (ref 0.1–1.2)
ALBUMIN SERPL-MCNC: 3.9 G/DL (ref 3.5–5.2)
ALBUMIN/GLOBULIN RATIO: 1.4 (ref 1–2.5)
ALP BLD-CCNC: 67 U/L (ref 40–129)
ALT SERPL-CCNC: 16 U/L (ref 5–41)
ANION GAP SERPL CALCULATED.3IONS-SCNC: 10 MMOL/L (ref 9–17)
ANION GAP SERPL CALCULATED.3IONS-SCNC: 12 MMOL/L (ref 9–17)
AST SERPL-CCNC: 19 U/L
BASOPHILS # BLD: 0 % (ref 0–2)
BASOPHILS ABSOLUTE: <0.03 K/UL (ref 0–0.2)
BILIRUB SERPL-MCNC: 0.43 MG/DL (ref 0.3–1.2)
BILIRUBIN DIRECT: 0.11 MG/DL
BILIRUBIN, INDIRECT: 0.32 MG/DL (ref 0–1)
BUN BLDV-MCNC: 16 MG/DL (ref 6–20)
BUN BLDV-MCNC: 16 MG/DL (ref 6–20)
BUN/CREAT BLD: 21 (ref 9–20)
CALCIUM SERPL-MCNC: 8.5 MG/DL (ref 8.6–10.4)
CALCIUM SERPL-MCNC: 9.1 MG/DL (ref 8.6–10.4)
CHLORIDE BLD-SCNC: 103 MMOL/L (ref 98–107)
CHLORIDE BLD-SCNC: 104 MMOL/L (ref 98–107)
CO2: 22 MMOL/L (ref 20–31)
CO2: 24 MMOL/L (ref 20–31)
CREAT SERPL-MCNC: 0.78 MG/DL (ref 0.7–1.2)
CREAT SERPL-MCNC: 0.89 MG/DL (ref 0.7–1.2)
EOSINOPHILS RELATIVE PERCENT: 0 % (ref 1–4)
GFR AFRICAN AMERICAN: >60 ML/MIN
GFR AFRICAN AMERICAN: >60 ML/MIN
GFR NON-AFRICAN AMERICAN: >60 ML/MIN
GFR NON-AFRICAN AMERICAN: >60 ML/MIN
GFR SERPL CREATININE-BSD FRML MDRD: ABNORMAL ML/MIN/{1.73_M2}
GFR SERPL CREATININE-BSD FRML MDRD: ABNORMAL ML/MIN/{1.73_M2}
GLUCOSE BLD-MCNC: 148 MG/DL (ref 70–99)
GLUCOSE BLD-MCNC: 160 MG/DL (ref 70–99)
HCT VFR BLD CALC: 41.2 % (ref 40.7–50.3)
HEMOGLOBIN: 13.5 G/DL (ref 13–17)
IMMATURE GRANULOCYTES: 1 %
LYMPHOCYTES # BLD: 10 % (ref 24–43)
MCH RBC QN AUTO: 30.6 PG (ref 25.2–33.5)
MCHC RBC AUTO-ENTMCNC: 32.8 G/DL (ref 25.2–33.5)
MCV RBC AUTO: 93.4 FL (ref 82.6–102.9)
MONOCYTES # BLD: 9 % (ref 3–12)
NRBC AUTOMATED: 0 PER 100 WBC
PDW BLD-RTO: 13.9 % (ref 11.8–14.4)
PLATELET # BLD: 210 K/UL (ref 138–453)
PMV BLD AUTO: 10.6 FL (ref 8.1–13.5)
POTASSIUM SERPL-SCNC: 4.4 MMOL/L (ref 3.7–5.3)
POTASSIUM SERPL-SCNC: 4.6 MMOL/L (ref 3.7–5.3)
RBC # BLD: 4.41 M/UL (ref 4.21–5.77)
SEG NEUTROPHILS: 80 % (ref 36–65)
SEGMENTED NEUTROPHILS ABSOLUTE COUNT: 13.2 K/UL (ref 1.5–8.1)
SODIUM BLD-SCNC: 137 MMOL/L (ref 135–144)
SODIUM BLD-SCNC: 138 MMOL/L (ref 135–144)
TOTAL PROTEIN: 6.6 G/DL (ref 6.4–8.3)
WBC # BLD: 16.4 K/UL (ref 3.5–11.3)

## 2022-03-09 PROCEDURE — 97116 GAIT TRAINING THERAPY: CPT

## 2022-03-09 PROCEDURE — 6370000000 HC RX 637 (ALT 250 FOR IP): Performed by: INTERNAL MEDICINE

## 2022-03-09 PROCEDURE — 2580000003 HC RX 258: Performed by: NURSE PRACTITIONER

## 2022-03-09 PROCEDURE — 99217 PR OBSERVATION CARE DISCHARGE MANAGEMENT: CPT | Performed by: INTERNAL MEDICINE

## 2022-03-09 PROCEDURE — 97535 SELF CARE MNGMENT TRAINING: CPT

## 2022-03-09 PROCEDURE — 2580000003 HC RX 258

## 2022-03-09 PROCEDURE — G0378 HOSPITAL OBSERVATION PER HR: HCPCS

## 2022-03-09 PROCEDURE — 6360000002 HC RX W HCPCS: Performed by: ORTHOPAEDIC SURGERY

## 2022-03-09 PROCEDURE — 96374 THER/PROPH/DIAG INJ IV PUSH: CPT

## 2022-03-09 PROCEDURE — 85025 COMPLETE CBC W/AUTO DIFF WBC: CPT

## 2022-03-09 PROCEDURE — 94760 N-INVAS EAR/PLS OXIMETRY 1: CPT

## 2022-03-09 PROCEDURE — 80048 BASIC METABOLIC PNL TOTAL CA: CPT

## 2022-03-09 PROCEDURE — 96376 TX/PRO/DX INJ SAME DRUG ADON: CPT

## 2022-03-09 PROCEDURE — 6370000000 HC RX 637 (ALT 250 FOR IP): Performed by: NURSE PRACTITIONER

## 2022-03-09 PROCEDURE — 6360000002 HC RX W HCPCS: Performed by: NURSE PRACTITIONER

## 2022-03-09 PROCEDURE — 2580000003 HC RX 258: Performed by: ORTHOPAEDIC SURGERY

## 2022-03-09 PROCEDURE — 6370000000 HC RX 637 (ALT 250 FOR IP)

## 2022-03-09 PROCEDURE — 36415 COLL VENOUS BLD VENIPUNCTURE: CPT

## 2022-03-09 RX ORDER — LACTULOSE 10 G/15ML
30 SOLUTION ORAL 3 TIMES DAILY
Qty: 1 EACH | Refills: 0 | Status: SHIPPED | OUTPATIENT
Start: 2022-03-09 | End: 2022-03-16

## 2022-03-09 RX ORDER — SODIUM CHLORIDE 0.9 % (FLUSH) 0.9 %
5-40 SYRINGE (ML) INJECTION EVERY 12 HOURS SCHEDULED
Status: DISCONTINUED | OUTPATIENT
Start: 2022-03-09 | End: 2022-03-09 | Stop reason: HOSPADM

## 2022-03-09 RX ORDER — DOCUSATE SODIUM 100 MG/1
100 CAPSULE, LIQUID FILLED ORAL DAILY
Qty: 10 CAPSULE | Refills: 0 | COMMUNITY
Start: 2022-03-10 | End: 2022-03-18

## 2022-03-09 RX ORDER — CALCIUM CARBONATE 200(500)MG
TABLET,CHEWABLE ORAL
Status: DISCONTINUED
Start: 2022-03-09 | End: 2022-03-09 | Stop reason: HOSPADM

## 2022-03-09 RX ORDER — MAGNESIUM HYDROXIDE/ALUMINUM HYDROXICE/SIMETHICONE 120; 1200; 1200 MG/30ML; MG/30ML; MG/30ML
30 SUSPENSION ORAL EVERY 6 HOURS PRN
Status: DISCONTINUED | OUTPATIENT
Start: 2022-03-09 | End: 2022-03-09 | Stop reason: HOSPADM

## 2022-03-09 RX ORDER — SODIUM CHLORIDE 0.9 % (FLUSH) 0.9 %
5-40 SYRINGE (ML) INJECTION PRN
Status: DISCONTINUED | OUTPATIENT
Start: 2022-03-09 | End: 2022-03-09 | Stop reason: HOSPADM

## 2022-03-09 RX ORDER — NICOTINE 21 MG/24HR
1 PATCH, TRANSDERMAL 24 HOURS TRANSDERMAL DAILY
Qty: 30 PATCH | Refills: 3 | Status: SHIPPED | OUTPATIENT
Start: 2022-03-10 | End: 2022-05-24

## 2022-03-09 RX ORDER — SODIUM CHLORIDE 9 MG/ML
25 INJECTION, SOLUTION INTRAVENOUS PRN
Status: DISCONTINUED | OUTPATIENT
Start: 2022-03-09 | End: 2022-03-09 | Stop reason: HOSPADM

## 2022-03-09 RX ORDER — CALCIUM CARBONATE 200(500)MG
500 TABLET,CHEWABLE ORAL 3 TIMES DAILY PRN
Status: DISCONTINUED | OUTPATIENT
Start: 2022-03-09 | End: 2022-03-09 | Stop reason: HOSPADM

## 2022-03-09 RX ADMIN — POLYETHYLENE GLYCOL 3350 17 G: 17 POWDER, FOR SOLUTION ORAL at 08:40

## 2022-03-09 RX ADMIN — HYDROCODONE BITARTRATE AND ACETAMINOPHEN 1 TABLET: 5; 325 TABLET ORAL at 11:17

## 2022-03-09 RX ADMIN — METOPROLOL TARTRATE 25 MG: 25 TABLET, FILM COATED ORAL at 08:40

## 2022-03-09 RX ADMIN — CEFAZOLIN SODIUM 1000 MG: 1 INJECTION, POWDER, FOR SOLUTION INTRAMUSCULAR; INTRAVENOUS at 00:52

## 2022-03-09 RX ADMIN — LACTULOSE 30 G: 20 SOLUTION ORAL at 08:42

## 2022-03-09 RX ADMIN — KETOROLAC TROMETHAMINE 15 MG: 30 INJECTION, SOLUTION INTRAMUSCULAR; INTRAVENOUS at 09:35

## 2022-03-09 RX ADMIN — CYCLOBENZAPRINE 10 MG: 10 TABLET, FILM COATED ORAL at 13:40

## 2022-03-09 RX ADMIN — PANTOPRAZOLE SODIUM 40 MG: 40 TABLET, DELAYED RELEASE ORAL at 06:21

## 2022-03-09 RX ADMIN — HYDROCODONE BITARTRATE AND ACETAMINOPHEN 2 TABLET: 5; 325 TABLET ORAL at 00:55

## 2022-03-09 RX ADMIN — DOCUSATE SODIUM 100 MG: 100 CAPSULE, LIQUID FILLED ORAL at 08:40

## 2022-03-09 RX ADMIN — SODIUM CHLORIDE: 9 INJECTION, SOLUTION INTRAVENOUS at 00:51

## 2022-03-09 RX ADMIN — ALUMINUM HYDROXIDE, MAGNESIUM HYDROXIDE, AND SIMETHICONE 30 ML: 200; 200; 20 SUSPENSION ORAL at 01:07

## 2022-03-09 RX ADMIN — HYDROCODONE BITARTRATE AND ACETAMINOPHEN 2 TABLET: 5; 325 TABLET ORAL at 15:38

## 2022-03-09 RX ADMIN — ANTACID TABLETS 500 MG: 500 TABLET, CHEWABLE ORAL at 08:43

## 2022-03-09 RX ADMIN — KETOROLAC TROMETHAMINE 15 MG: 30 INJECTION, SOLUTION INTRAMUSCULAR; INTRAVENOUS at 04:10

## 2022-03-09 RX ADMIN — SODIUM CHLORIDE, PRESERVATIVE FREE 10 ML: 5 INJECTION INTRAVENOUS at 08:41

## 2022-03-09 RX ADMIN — ASPIRIN 325 MG: 325 TABLET ORAL at 08:40

## 2022-03-09 RX ADMIN — LISINOPRIL 20 MG: 20 TABLET ORAL at 08:40

## 2022-03-09 ASSESSMENT — PAIN DESCRIPTION - PAIN TYPE: TYPE: SURGICAL PAIN

## 2022-03-09 ASSESSMENT — PAIN SCALES - GENERAL
PAINLEVEL_OUTOF10: 7
PAINLEVEL_OUTOF10: 8
PAINLEVEL_OUTOF10: 5
PAINLEVEL_OUTOF10: 2
PAINLEVEL_OUTOF10: 5
PAINLEVEL_OUTOF10: 5
PAINLEVEL_OUTOF10: 2
PAINLEVEL_OUTOF10: 10
PAINLEVEL_OUTOF10: 0
PAINLEVEL_OUTOF10: 2
PAINLEVEL_OUTOF10: 4
PAINLEVEL_OUTOF10: 7

## 2022-03-09 ASSESSMENT — PAIN DESCRIPTION - LOCATION: LOCATION: KNEE

## 2022-03-09 ASSESSMENT — PAIN DESCRIPTION - ORIENTATION: ORIENTATION: RIGHT

## 2022-03-09 NOTE — PROGRESS NOTES
Orthopaedic Progress Note      SUBJECTIVE   Mr. Hill Blood is post op day # 1     Patient notes that he is having right knee pain    S/p right TKA      OBJECTIVE      Physical    VITALS:  BP (!) 145/70   Pulse 66   Temp 98.4 °F (36.9 °C) (Tympanic)   Resp 15   Ht 5' 11\" (1.803 m)   Wt 225 lb 9.6 oz (102.3 kg)   SpO2 94%   BMI 31.46 kg/m²   INTAKE/OUTPUT:    Intake/Output Summary (Last 24 hours) at 3/9/2022 1258  Last data filed at 3/9/2022 0840  Gross per 24 hour   Intake 128 ml   Output --   Net 128 ml     I/O last 3 completed shifts:   In: 1500 [I.V.:1500]  Out: 200 [Blood:200]      Right knee dressing dry and intact, n/v intact to the right foot      Data  CBC:   Lab Results   Component Value Date    WBC 16.4 03/09/2022    HGB 13.5 03/09/2022     03/09/2022     BMP:    Lab Results   Component Value Date     03/09/2022    K 4.4 03/09/2022     03/09/2022    CO2 24 03/09/2022    BUN 16 03/09/2022    CREATININE 0.78 03/09/2022    CALCIUM 8.5 03/09/2022    GLUCOSE 148 03/09/2022     Uric Acid:  No components found for: URIC  PT/INR:    Lab Results   Component Value Date    PROTIME 10.4 02/25/2019    INR 1.0 02/25/2019     Troponin:  No results found for: TROPONINI  Urine Culture:  No components found for: OLEGARIO      Current Inpatient Medications    Current Facility-Administered Medications: aluminum & magnesium hydroxide-simethicone (MAALOX) 200-200-20 MG/5ML suspension 30 mL, 30 mL, Oral, Q6H PRN  sodium chloride flush 0.9 % injection 5-40 mL, 5-40 mL, IntraVENous, 2 times per day  sodium chloride flush 0.9 % injection 5-40 mL, 5-40 mL, IntraVENous, PRN  0.9 % sodium chloride infusion, 25 mL, IntraVENous, PRN  calcium carbonate (TUMS) chewable tablet 500 mg, 500 mg, Oral, TID PRN  calcium carbonate (TUMS) 500 MG chewable tablet, , ,   atorvastatin (LIPITOR) tablet 40 mg, 40 mg, Oral, Nightly  pantoprazole (PROTONIX) tablet 40 mg, 40 mg, Oral, QAM AC  lisinopril (PRINIVIL;ZESTRIL) tablet 20 mg, 20 mg, Oral, Daily  metoprolol tartrate (LOPRESSOR) tablet 25 mg, 25 mg, Oral, BID  ketorolac (TORADOL) injection 15 mg, 15 mg, IntraVENous, Q6H  cyclobenzaprine (FLEXERIL) tablet 10 mg, 10 mg, Oral, TID PRN  ondansetron (ZOFRAN) injection 4 mg, 4 mg, IntraVENous, Q6H PRN  morphine (PF) injection 2 mg, 2 mg, IntraVENous, Q2H PRN **OR** morphine injection 4 mg, 4 mg, IntraVENous, Q2H PRN  HYDROcodone-acetaminophen (NORCO) 5-325 MG per tablet 1 tablet, 1 tablet, Oral, Q4H PRN **OR** HYDROcodone-acetaminophen (NORCO) 5-325 MG per tablet 2 tablet, 2 tablet, Oral, Q4H PRN  docusate sodium (COLACE) capsule 100 mg, 100 mg, Oral, Daily  magnesium hydroxide (MILK OF MAGNESIA) 400 MG/5ML suspension 30 mL, 30 mL, Oral, Daily PRN  aspirin tablet 325 mg, 325 mg, Oral, Daily  acetaminophen (TYLENOL) tablet 650 mg, 650 mg, Oral, Q4H PRN  polyethylene glycol (GLYCOLAX) packet 17 g, 17 g, Oral, Daily  lactulose (CHRONULAC) 10 GM/15ML solution 30 g, 30 g, Oral, TID  nicotine (NICODERM CQ) 21 MG/24HR 1 patch, 1 patch, TransDERmal, Daily  sodium chloride nebulizer 0.9 % solution 3 mL, 3 mL, Nebulization, As Directed RT PRN  albuterol (PROVENTIL) nebulizer solution 2.5 mg, 2.5 mg, Nebulization, Q6H PRN        PLAN    WBAT    PT, TKA protocal    F/U Nakul in 6-8 weeks

## 2022-03-09 NOTE — PROGRESS NOTES
performing transfer into chair.   Ambulation  Ambulation?: Yes  Ambulation 1  Surface: level tile  Device: Rolling Walker  Assistance: Contact guard assistance  Gait Deviations: Slow Zaynab  Distance: 25'        Exercises  Gluteal Sets: 15x  Knee Long Arc Quad: 15x  Ankle Pumps: 15x  Comments: Performed EOB                        G-Code     OutComes Score                                                     AM-PAC Score             Goals  Short term goals  Time Frame for Short term goals: 1 day  Short term goal 1: Assess functional status  Long term goals  Time Frame for Long term goals : 3 days  Long term goal 1: Transfer SBA  Long term goal 2: Gait with RW SBA 50 ft    Plan    Plan  Times per day: Twice a day  Current Treatment Recommendations: Gait Training,Transfer Training,ROM  Safety Devices  Type of devices: Left in chair,Call light within reach,Nurse notified     Therapy Time   Individual Concurrent Group Co-treatment   Time In 0101         Time Out 0111         Minutes 10                 [place], [place]

## 2022-03-09 NOTE — PROGRESS NOTES
Discharge teaching and instructions for procedure of a right total knee replacement completed with patient and wife using teachback method. AVS reviewed with the patient, after printing the AVS the patient requested Norco. Next due time was updated on AVS. Prescriptions were picked up by the wife. Patient voiced understanding regarding prescriptions, education regarding new medications were given to the patient, follow up appointments were made for the patient, and care of self at home was discussed. CRF was faxed to [facility]. Unit phone number highlighted on AVS if questions arise.

## 2022-03-09 NOTE — PROGRESS NOTES
CLINICAL PHARMACY NOTE: MEDS TO BEDS    Total # of Prescriptions Filled: 2   The following medications were delivered to the patient:  · Nicotine 21 Patch  · Lactulose 10gm/15ml    Additional Documentation:

## 2022-03-09 NOTE — DISCHARGE INSTR - DIET

## 2022-03-09 NOTE — PROGRESS NOTES
Physical Therapy  Facility/Department: [facility]  PROGRESSIVE CARE  Daily Treatment Note  NAME: Ashish Singh  : 1960  MRN: [id number]    Date of Service: 3/9/2022    Discharge Recommendations:  Home with Home health PT,Outpatient PT        Assessment   Body structures, Functions, Activity limitations: Decreased functional mobility ; Decreased balance;Decreased ROM; Decreased strength  Activity Tolerance  Activity Tolerance: Patient Tolerated treatment well;Patient limited by fatigue  Activity Tolerance: Fatigued after ambulating. Patient Diagnosis(es): The encounter diagnosis was Status post right knee replacement. has a past medical history of Bunion, CAD (coronary artery disease), Diverticul disease small and large intestine, no perforati or abscess, GERD (gastroesophageal reflux disease), Hammer toe, Heart attack ([facility] [address] Hyperlipidemia, Hypertension, Pericarditis, Positive cardiac stress test, and Smoking history. has a past surgical history that includes Upper gastrointestinal endoscopy; Knee arthroscopy (-, -); Foot surgery; Colonoscopy (2018); Total knee arthroplasty (Left, 2019); Cardiac catheterization (2022); Coronary angioplasty; joint replacement (Left); and Endoscopy, colon, diagnostic. Restrictions  Restrictions/Precautions  Implants present? : Metal implants (knee)  Subjective   Subjective  Subjective: Supine in bed agreeable to session.   Pain Assessment  Pain Assessment: 0-10  Pain Level: 5       Orientation  Orientation  Overall Orientation Status: Within Normal Limits  Cognition      Objective   Bed mobility  Rolling to Left: Supervision  Supine to Sit: Supervision  Sit to Supine: Supervision  Transfers  Sit to Stand: Stand by assistance;Contact guard assistance  Stand to sit: Stand by assistance;Contact guard assistance  Ambulation  Ambulation?: Yes  Ambulation 1  Surface: level tile  Device: Rolling Walker  Assistance: Contact guard assistance  Gait

## 2022-03-09 NOTE — PLAN OF CARE
Problem: Discharge Planning:  Goal: Patients continuum of care needs are met  Description: Patients continuum of care needs are met  Outcome: Met This Shift   Patient/Family/Responsible party discussed discharge planning, provided with list of SNF/Home health agencies. Chose [facility]. Information called and faxed.

## 2022-03-09 NOTE — PROGRESS NOTES
Hospitalist Progress Note    Patient:  Ever Howe     YOB: 1960    MRN: [id number]   Admit date: 3/8/2022     Acct: [de-identified]     PCP: Carlos Berg MD    CC--Interval History:     POD 1 right TKA---expected stiffness and discomfort---getting physical---OT therapies    ASCVD---no chest pain---no shortness of breath     CHF----chronic systolic---stable    HTN-----BP = 137/69    Expected blood loss surgery---not in an anemic range    Tobacco abuse----current daily advised cessation---nicotine patch    See note below     All other ROS negative except noted in HPI    Diet:  ADULT DIET;  Regular    Medications:  Scheduled Meds:   sodium chloride flush  5-40 mL IntraVENous 2 times per day    calcium carbonate        atorvastatin  40 mg Oral Nightly    pantoprazole  40 mg Oral QAM AC    lisinopril  20 mg Oral Daily    metoprolol tartrate  25 mg Oral BID    ketorolac  15 mg IntraVENous Q6H    docusate sodium  100 mg Oral Daily    aspirin  325 mg Oral Daily    polyethylene glycol  17 g Oral Daily    lactulose  30 g Oral TID    nicotine  1 patch TransDERmal Daily     Continuous Infusions:   sodium chloride       PRN Meds:aluminum & magnesium hydroxide-simethicone, sodium chloride flush, sodium chloride, calcium carbonate, cyclobenzaprine, ondansetron, morphine **OR** morphine, HYDROcodone 5 mg - acetaminophen **OR** HYDROcodone 5 mg - acetaminophen, magnesium hydroxide, acetaminophen, sodium chloride nebulizer, albuterol    Objective:  Labs:  CBC with Differential:    Lab Results   Component Value Date    WBC 16.4 03/09/2022    RBC 4.41 03/09/2022    HGB 13.5 03/09/2022    HCT 41.2 03/09/2022     03/09/2022    MCV 93.4 03/09/2022    MCH 30.6 03/09/2022    MCHC 32.8 03/09/2022    RDW 13.9 03/09/2022    LYMPHOPCT 10 03/09/2022    MONOPCT 9 03/09/2022    BASOPCT 0 03/09/2022    MONOSABS 1.40 03/09/2022    LYMPHSABS 1.70 03/09/2022    EOSABS <0.03 03/09/2022    BASOSABS <0.03 03/09/2022 DIFFTYPE NOT REPORTED 02/07/2022     BMP:    Lab Results   Component Value Date     03/09/2022    K 4.4 03/09/2022     03/09/2022    CO2 24 03/09/2022    BUN 16 03/09/2022    LABALBU 3.9 03/08/2022    CREATININE 0.78 03/09/2022    CALCIUM 8.5 03/09/2022    GFRAA >60 03/09/2022    LABGLOM >60 03/09/2022    GLUCOSE 148 03/09/2022           Physical Exam:  Vitals: BP (!) 145/70   Pulse 66   Temp 98.4 °F (36.9 °C) (Tympanic)   Resp 15   Ht 5' 11\" (1.803 m)   Wt 225 lb 9.6 oz (102.3 kg)   SpO2 94%   BMI 31.46 kg/m²   24 hour intake/output:    Intake/Output Summary (Last 24 hours) at 3/9/2022 1502  Last data filed at 3/9/2022 0840  Gross per 24 hour   Intake 128 ml   Output --   Net 128 ml     Last 3 weights: Wt Readings from Last 3 Encounters:   03/08/22 225 lb 9.6 oz (102.3 kg)   02/25/22 232 lb (105.2 kg)   02/10/22 235 lb (106.6 kg)     HEENT: Normocephalic and Atraumatic  Neck: Supple, No Masses, Tenderness, Nodularity and No Lymphadenopathy  Chest/Lungs: Clear to Auscultation without Rales, Rhonchi, or Wheezes  Cardiac: Regular Rate and Rhythm  GI/Abdomen: Bowel Sounds Present and Soft, Non-tender, without Guarding or Rebound Tenderness  : Not examined  EXT/Skin: sp right TKA----dressing CDI---can wiggle toes---capillary refill < 2 seconds----chronic decreased sensation over right great toe [prior bunionectomy], No Cyanosis, No Clubbing and Edema Present  Neuro: alert---expected gait-balance instability post TKA---otherwise--- and No Localizing Signs/Symptoms      Assessment:    Principal Problem:    Status post right knee replacement  Resolved Problems:    * No resolved hospital problems. *    ERINN ORTIZ           61  WM  [axel Bateman, IM;   gasper Mota, Orthopedics]  FULL CODE     SCDs    ASPIRIN  COVID-19--NEGATIVE----3.8.2022---J&J--12.4.2021---3.6.2021    Anti-infectives:    Ancef IV x 3 doses    POD   _____   right TKA---3.8.2022  Primary osteoarthritis right knee---chronic right knee pain    ASCVD       EKG----3.8.2022---SR--79---marked sinus arrhythmia---1st degree AVB       Cardiac catheterization----2.25.2022---0% LM---30-40% mid-LAD----patent D1 stent--                                 large patent D2--10-20% LCx---patent mid LCx stent--100% chronic                                 occlusion RCA with good left to right collaterals---LVEF ~ 60%--            Nuclear stress---2.15.2022--normal EKG portion-- inferior infarction with moderate size                                  ariel-infarct ischemia--normal WM--DD---LVEF ~ 57%  -       EKG----2.10.2022----SR--85--1st degree AVB       Nuclear stress---2. 6.2019--severe resting LV dysfunction--EF ~ 35-49%--                                [id number] inferior wall infarction--per-infarct ischemia--                                decreased LVEF ~ 46%--down from 49%--no change from prior                                 studies       Cardiac catheterization--5. 9.2016---PTCA--LIBBY D1 and LIBBY circumflex---                                 99% D1--75% mid-circumflex       Cardiac catheterization---12. 3.2015--chronic 100% occlusion RCA with left-to-right collaterals--                                  NLVSF--0% LM--70% D1-----diffuse  irregularities 40-50% LAD--                                  40-50% left circumflex---medical management       Positive cardiac stress test---2015       MI--11.9.2009       Pericarditis     CHF----chronic systolic  Anemia--iron deficiency  Anemia--expected acute blood loss--surgery  Hypertension  CKD----Stage 2  GERD  Tobacco abuse--current--daily  ETOH usage   HEARING IMPAIRMENT  PMH:    right knee pain--OA,  right bunion---hammer toe, diverticular disease--colon, hyponatremia   PSH:     EGD, left arthroscopy---2006--2014, right bunionectomy,                 colonoscopy--2018--diverticulosis--polyp, left TKA----2.26.2018---[facility]    Allergies:   Cipro---rash    Patient received smoking cessation information during this hospitalization. Pt was given the [address] number. Smoking cessation medication patient was given at discharge:  Nicotine Patch    Reason no smoking cessation medication given Not Applicable    Plan:  1. Home-----3.9.2022  2. Medications reviewed  3. Tobacco abuse---cessation---nicotine patch  4. Follow up Dr. Shun Plascencia, IM  5. Follow up Dr. Shawn Baldwin, Orthopedics  6.     See orders     Electronically signed by Mary Morris on 3/9/2022 at 3:02 PM    Hospitalist

## 2022-03-09 NOTE — PROGRESS NOTES
DISCHARGE BARRIERS       Reason for Referral:  SW completed a Psychosocial Assessment for evaluation of patient's mental health, social status, and functional capacity within the community. Tami Choe is a 63 y.o. male admitted due to Status post right knee replacement. SW provided supportive listening while patient discussed past medical history and events leading up to hospitalization. Mental Status:  Alert, oriented, and engaging during assessment. Decision Making:  Makes own decisions. Family/Social/Home Environment: Lives with spouse in [facility]. Support: Discussed a good social support network     Current Services: None    Current DMEs: None       PCP: Cisco Hackett MD and repots no issues affording medication.  status:  None     ADLs and means of transportation: Independent in ADLs prior to hospitalization and     Food insecurity or needed financial assistance: Denies any food insecurity or financial concerns at this time. ACP and Code Status:  Tami Choe is a Full Code status and does not have an Advance Directive. SW discussed an Advance Directive which included the patient's choices for care and treatment in the case of a health event that adversely affects decision-making abilities. SW provided education and resources. Tami Choe has no questions at this time and has agreed to keep me up-to-date should anything change. Collaborative List of SNF/ECF/HH were provided: Patient states he will be discharged home with home health. No discharge order at this time. Anticipated Needs/Discharge Plan:  Spoke with patient/family/representative about discharge plan. Patient/Family/Representative verbalizes understanding of the plan of care and denies discharge needs or further services at this time. SW provided business card. SW will continue to monitor needs and assist as appropriate.           Electronically signed by YANDEL Shaw on 3/9/2022 at 12:15 PM

## 2022-03-09 NOTE — PLAN OF CARE
Problem: Pain:  Description: Pain management should include both nonpharmacologic and pharmacologic interventions. Goal: Pain level will decrease  Description: Pain level will decrease  3/8/2022 1921 by Cary Johnston RN  Outcome: Ongoing  3/8/2022 1255 by Love Marquis RN  Outcome: Ongoing  Goal: Control of acute pain  Description: Control of acute pain  3/8/2022 1921 by Cary Johnston RN  Outcome: Ongoing  3/8/2022 1255 by Love Marquis RN  Outcome: Ongoing  Goal: Control of chronic pain  Description: Control of chronic pain  3/8/2022 1921 by Cary Johnston RN  Outcome: Ongoing  3/8/2022 1255 by Love Marquis RN  Outcome: Ongoing     Problem: Activity:  Goal: Ability to return to normal activity level will improve  Description: Ability to return to normal activity level will improve  3/8/2022 1921 by Cary Johnston RN  Outcome: Ongoing  3/8/2022 1255 by Love Marquis RN  Outcome: Ongoing     Problem:  Bowel/Gastric:  Goal: Gastrointestinal status for postoperative course will improve  Description: Gastrointestinal status for postoperative course will improve  3/8/2022 1921 by Cary Johnston RN  Outcome: Ongoing  3/8/2022 1255 by Love Marquis RN  Outcome: Ongoing     Problem: Health Behavior:  Goal: Identification of resources available to assist in meeting health care needs will improve  Description: Identification of resources available to assist in meeting health care needs will improve  3/8/2022 1921 by Cary Johnston RN  Outcome: Ongoing  3/8/2022 1255 by Love Marquis RN  Outcome: Ongoing     Problem: Physical Regulation:  Goal: Postoperative complications will be avoided or minimized  Description: Postoperative complications will be avoided or minimized  3/8/2022 1921 by Cary Johnston RN  Outcome: Ongoing  3/8/2022 1255 by Love Marquis RN  Outcome: Ongoing     Problem: Respiratory:  Goal: Ability to achieve and maintain a regular respiratory rate will improve  Description: Ability to achieve and maintain a regular respiratory rate will improve  3/8/2022 1921 by Rohan Maria RN  Outcome: Ongoing  3/8/2022 1255 by Rachell Thompson RN  Outcome: Ongoing     Problem: Safety:  Goal: Ability to remain free from injury will improve  Description: Ability to remain free from injury will improve  3/8/2022 1921 by Rohan Maria RN  Outcome: Ongoing  3/8/2022 1255 by Rachell Thompson RN  Outcome: Ongoing     Problem: Sensory:  Goal: General experience of comfort will improve  Description: General experience of comfort will improve  3/8/2022 1921 by Rohan Maria RN  Outcome: Ongoing  3/8/2022 1255 by Rachell Thompson RN  Outcome: Ongoing     Problem: Skin Integrity:  Goal: Demonstration of wound healing without infection will improve  Description: Demonstration of wound healing without infection will improve  3/8/2022 1921 by Rohan Maria RN  Outcome: Ongoing  3/8/2022 1255 by Rachell Thompson RN  Outcome: Ongoing

## 2022-03-17 ENCOUNTER — TELEPHONE (OUTPATIENT)
Dept: ORTHOPEDIC SURGERY | Age: 62
End: 2022-03-17

## 2022-03-17 DIAGNOSIS — Z96.659 STATUS POST KNEE REPLACEMENT, UNSPECIFIED LATERALITY: Primary | ICD-10-CM

## 2022-03-17 RX ORDER — HYDROCODONE BITARTRATE AND ACETAMINOPHEN 5; 325 MG/1; MG/1
1 TABLET ORAL EVERY 4 HOURS PRN
Qty: 30 TABLET | Refills: 0 | Status: SHIPPED | OUTPATIENT
Start: 2022-03-17 | End: 2022-03-24

## 2022-03-18 ENCOUNTER — OFFICE VISIT (OUTPATIENT)
Dept: FAMILY MEDICINE CLINIC | Age: 62
End: 2022-03-18
Payer: COMMERCIAL

## 2022-03-18 VITALS
OXYGEN SATURATION: 98 % | HEART RATE: 94 BPM | DIASTOLIC BLOOD PRESSURE: 64 MMHG | HEIGHT: 71 IN | BODY MASS INDEX: 31.44 KG/M2 | RESPIRATION RATE: 18 BRPM | SYSTOLIC BLOOD PRESSURE: 112 MMHG | WEIGHT: 224.6 LBS

## 2022-03-18 DIAGNOSIS — I25.10 CORONARY ARTERY DISEASE INVOLVING NATIVE CORONARY ARTERY OF NATIVE HEART WITHOUT ANGINA PECTORIS: ICD-10-CM

## 2022-03-18 DIAGNOSIS — Z96.651 HISTORY OF TOTAL RIGHT KNEE REPLACEMENT: Primary | ICD-10-CM

## 2022-03-18 DIAGNOSIS — I10 PRIMARY HYPERTENSION: ICD-10-CM

## 2022-03-18 DIAGNOSIS — Z13.1 DIABETES MELLITUS SCREENING: ICD-10-CM

## 2022-03-18 DIAGNOSIS — Z12.5 PROSTATE CANCER SCREENING: ICD-10-CM

## 2022-03-18 PROCEDURE — 99202 OFFICE O/P NEW SF 15 MIN: CPT

## 2022-03-18 PROCEDURE — 99203 OFFICE O/P NEW LOW 30 MIN: CPT | Performed by: NURSE PRACTITIONER

## 2022-03-18 ASSESSMENT — PATIENT HEALTH QUESTIONNAIRE - PHQ9
SUM OF ALL RESPONSES TO PHQ9 QUESTIONS 1 & 2: 1
SUM OF ALL RESPONSES TO PHQ QUESTIONS 1-9: 1
SUM OF ALL RESPONSES TO PHQ QUESTIONS 1-9: 1
2. FEELING DOWN, DEPRESSED OR HOPELESS: 1
1. LITTLE INTEREST OR PLEASURE IN DOING THINGS: 0
SUM OF ALL RESPONSES TO PHQ QUESTIONS 1-9: 1
SUM OF ALL RESPONSES TO PHQ QUESTIONS 1-9: 1

## 2022-03-18 NOTE — PROGRESS NOTES
Subjective:      Patient ID: Aleah Garces is a 63 y.o. male coming in for   Chief Complaint   Patient presents with    New Patient     new to establish    Knee Pain     right knee replacement 3/8/22. he is doing PT in [place]. was discharged from home health PT.    Other     would like a handicap placard    Pain     right ankle/foot pain post surgery. rates 6/10 at its worst.    Other     questioning smoking with a nicotine patch? HPI  Presents to office for new pt appt. Pt recently underwent right total knee replacement. He completed home PT/OT and is currently completing outpatient therapy in [place]. Pt seems to be doing well post op. Denies fevers/ chills, drainage from incision. Pt would like handicap placard. Pt has had some right ankle pain since surgery, reports it has gotten better. Pt wanting to quit smoking, currently using nicotine patches. Pt does have history of CAD and HTN. Did have cardiac cath prior to knee replacement, no stents placed. Review of Systems   Constitutional: Negative for chills and fever. Respiratory: Negative for shortness of breath. Cardiovascular: Positive for leg swelling (mild right lower leg, no redness no calf pain). Negative for chest pain and palpitations. Genitourinary: Negative. Musculoskeletal: Positive for arthralgias and gait problem. Skin: Negative for color change. Objective:  /64   Pulse 94   Resp 18   Ht 5' 11\" (1.803 m)   Wt 224 lb 9.6 oz (101.9 kg)   SpO2 98%   BMI 31.33 kg/m²      Physical Exam  Vitals and nursing note reviewed. Constitutional:       General: He is not in acute distress. Appearance: Normal appearance. He is not ill-appearing. HENT:      Head: Normocephalic. Cardiovascular:      Rate and Rhythm: Normal rate and regular rhythm. Heart sounds: Normal heart sounds. Pulmonary:      Effort: Pulmonary effort is normal.      Breath sounds: Normal breath sounds.    Musculoskeletal: General: Swelling (to right lower extremity, 1+, no calf tenderness) present. Right lower leg: No edema. Left lower leg: No edema. Skin:     General: Skin is warm and dry. Findings: No rash. Comments: Right anterior incision of knee intact, no drainage. Dressing intact. Neurological:      General: No focal deficit present. Mental Status: He is alert and oriented to person, place, and time. Assessment:      1. History of total right knee replacement    2. Primary hypertension    3. Coronary artery disease involving native coronary artery of native heart without angina pectoris    4. Diabetes mellitus screening    5. Prostate cancer screening           Plan:   -doing well with post op rehab, continue until released   -follow up with ortho as scheduled  -BP stable  -routine labs ordered to due prior to next appt  -call office with any concerns or questions      Orders Placed This Encounter   Procedures    PSA Screening     Standing Status:   Future     Standing Expiration Date:   3/18/2023    Hemoglobin A1C     Standing Status:   Future     Standing Expiration Date:   3/18/2023    Lipid Panel     Standing Status:   Future     Standing Expiration Date:   3/18/2023     Order Specific Question:   Is Patient Fasting?/# of Hours     Answer:   8hr    Comprehensive Metabolic Panel     Standing Status:   Future     Standing Expiration Date:   3/18/2023      Outpatient Encounter Medications as of 3/18/2022   Medication Sig Dispense Refill    Handicap Placard MISC by Does not apply route Expires: 3/18/2027 1 each 0    HYDROcodone-acetaminophen (NORCO) 5-325 MG per tablet Take 1 tablet by mouth every 4 hours as needed for Pain for up to 7 days. Intended supply: 7 days.  Take lowest dose possible to manage pain 30 tablet 0    nicotine (NICODERM CQ) 21 MG/24HR Place 1 patch onto the skin daily 30 patch 3    [] cyclobenzaprine (FLEXERIL) 10 MG tablet Take 1 tablet by mouth 3 times daily as needed for Muscle spasms 40 tablet 0    aspirin (KAREEM ASPIRIN) 325 MG tablet Take 1 tablet by mouth daily 30 tablet 0    atorvastatin (LIPITOR) 40 MG tablet Take 1 tablet by mouth nightly 90 tablet 3    lisinopril (PRINIVIL;ZESTRIL) 20 MG tablet Take 1 tablet by mouth once daily 90 tablet 3    metoprolol tartrate (LOPRESSOR) 25 MG tablet Take 1 tablet by mouth twice daily 180 tablet 3    esomeprazole (NEXIUM) 20 MG delayed release capsule Take 20 mg by mouth daily      calcium carbonate 600 MG TABS tablet Take 1 tablet by mouth daily      [DISCONTINUED] docusate sodium (COLACE) 100 MG capsule Take 1 capsule by mouth daily for 10 days (Patient not taking: Reported on 3/18/2022) 10 capsule 0    [DISCONTINUED] ibuprofen (ADVIL;MOTRIN) 200 MG tablet Take 200 mg by mouth every 6 hours as needed for Pain       [DISCONTINUED] Handicap Placard MISC by Does not apply route Valid for 90 days     s/p left total knee arthroplasty (Patient not taking: Reported on 3/18/2022) 1 each 0     No facility-administered encounter medications on file as of 3/18/2022.             Huyen Brown, APRN - CNP

## 2022-03-20 ASSESSMENT — ENCOUNTER SYMPTOMS
COLOR CHANGE: 0
SHORTNESS OF BREATH: 0

## 2022-04-12 ENCOUNTER — TELEPHONE (OUTPATIENT)
Dept: ORTHOPEDIC SURGERY | Age: 62
End: 2022-04-12

## 2022-04-12 RX ORDER — CYCLOBENZAPRINE HCL 10 MG
TABLET ORAL
Qty: 40 TABLET | Refills: 0 | OUTPATIENT
Start: 2022-04-12

## 2022-04-12 RX ORDER — CYCLOBENZAPRINE HCL 10 MG
10 TABLET ORAL 3 TIMES DAILY PRN
Qty: 40 TABLET | Refills: 0 | Status: SHIPPED | OUTPATIENT
Start: 2022-04-12 | End: 2022-04-22

## 2022-04-12 NOTE — TELEPHONE ENCOUNTER
Patient would like a refill of Flexeril, patient stated he does not want anymore norco but would like flexeril because it helps with therapy.  Patient would like refill sent to [facility]

## 2022-04-19 ENCOUNTER — OFFICE VISIT (OUTPATIENT)
Dept: ORTHOPEDIC SURGERY | Age: 62
End: 2022-04-19

## 2022-04-19 VITALS
DIASTOLIC BLOOD PRESSURE: 80 MMHG | WEIGHT: 224 LBS | HEART RATE: 83 BPM | BODY MASS INDEX: 31.36 KG/M2 | SYSTOLIC BLOOD PRESSURE: 152 MMHG | HEIGHT: 71 IN

## 2022-04-19 DIAGNOSIS — Z96.659 STATUS POST KNEE REPLACEMENT, UNSPECIFIED LATERALITY: Primary | ICD-10-CM

## 2022-04-19 PROCEDURE — 99024 POSTOP FOLLOW-UP VISIT: CPT | Performed by: PHYSICIAN ASSISTANT

## 2022-04-19 NOTE — PROGRESS NOTES
Orthopaedic Progress Note      CHIEF COMPLAINT: Status post right total knee replacement    HISTORY OF PRESENT ILLNESS:       Mr. Ravindra Armendariz  is a 63 y.o. male  who presents with a history of right total knee replacement done 6 weeks ago. He comes in today stating he still has pain in that right knee. He has swelling in his right lower extremity. He continues to use compression stockings. He is in physical therapy. He states therapy states his knee lacks 5 degrees of full extension flexion is to 108 degrees. He is ambulating with assistive devices yet. He still on aspirin 81 mg tablet daily.   He is no longer taking any Norco.  He is here today for reevaluation      Past Medical History:    Past Medical History:   Diagnosis Date    Bunion     right    CAD (coronary artery disease)     MI, stents    Diverticul disease small and large intestine, no perforati or abscess     GERD (gastroesophageal reflux disease)     Hammer toe     right    Heart attack ([facility] [address] 11/09/2009    Hyperlipidemia     Hypertension     Pericarditis     Positive cardiac stress test     Smoking history        Past Surgical History:    Past Surgical History:   Procedure Laterality Date    CARDIAC CATHETERIZATION  02/25/2022    COLONOSCOPY  01/08/2018    diverticulosis,[id number]    CORONARY ANGIOPLASTY      stents x2    ENDOSCOPY, COLON, DIAGNOSTIC      FOOT SURGERY      JOINT REPLACEMENT Left     knee    JOINT REPLACEMENT Right 03/2022    KNEE ARTHROSCOPY  7-2006, 1-2014    TOTAL KNEE ARTHROPLASTY Left 2/26/2019    Left Total Knee Arthroplasty and injection of right knee performed by Aric Sebastian MD at [address] [place] [address] Right 3/8/2022    Right Total Knee Arthroplasty performed by [facility]ric [facility] MD at [facility]           Current  Medications:  Current Outpatient Medications   Medication Sig Dispense Refill    cyclobenzaprine (FLEXERIL) 10 MG tablet Take 1 tablet by mouth 3 times daily as needed for Muscle spasms 40 tablet 0    metoprolol tartrate (LOPRESSOR) 25 MG tablet Take 1 tablet by mouth twice daily 180 tablet 3    Handicap Placard MISC by Does not apply route Expires: 3/18/2027 1 each 0    nicotine (NICODERM CQ) 21 MG/24HR Place 1 patch onto the skin daily 30 patch 3    atorvastatin (LIPITOR) 40 MG tablet Take 1 tablet by mouth nightly 90 tablet 3    lisinopril (PRINIVIL;ZESTRIL) 20 MG tablet Take 1 tablet by mouth once daily 90 tablet 3    esomeprazole (NEXIUM) 20 MG delayed release capsule Take 20 mg by mouth daily      calcium carbonate 600 MG TABS tablet Take 1 tablet by mouth daily      aspirin (KAREEM ASPIRIN) 325 MG tablet Take 1 tablet by mouth daily 30 tablet 0     No current facility-administered medications for this visit. Allergies:  Ciprofloxacin    Social History:   Social History     Tobacco Use   Smoking Status Current Every Day Smoker    Packs/day: 0.25    Years: 30.00    Pack years: 7.50    Types: Cigarettes   Smokeless Tobacco Never Used     Social History     Substance and Sexual Activity   Alcohol Use Yes    Alcohol/week: 12.0 standard drinks    Types: 12 Standard drinks or equivalent per week    Comment: daily 1 beer     Social History     Substance and Sexual Activity   Drug Use No       Family History:  Family History   Problem Relation Age of Onset    Diabetes Mother     Early Death Brother 16        MVA    Early Death Sister 46        blood clot    Heart Disease Other        REVIEW OF SYSTEMS:  Constitutional: Denies any fever, chills. Musculoskeletal: Positive for bilateral total knee replacements in the past.      PHYSICAL EXAM:  [unfilled]  General appearance:  Alert and oriented x 3. No apparent distress, appears stated age, calm and cooperative. Musculoskeletal: Right knee was inspected. Intubates total knee incision is well approximated well-healed.   Does have a moderate knee joint effusion. He does have swelling down to the calf in the pretibial region. Negative Homans here today. Knee motion does lack probably 5 degrees of full extension flexion the office here today is about 110 degrees. Ken Feliz DATA:  CBC:   Lab Results   Component Value Date    WBC 16.4 03/09/2022    HGB 13.5 03/09/2022     03/09/2022     BMP:    Lab Results   Component Value Date     03/09/2022    K 4.4 03/09/2022     03/09/2022    CO2 24 03/09/2022    BUN 16 03/09/2022    CREATININE 0.78 03/09/2022    CALCIUM 8.5 03/09/2022    GLUCOSE 148 03/09/2022     PT/INR:    Lab Results   Component Value Date    PROTIME 10.4 02/25/2019    INR 1.0 02/25/2019     Troponin:  No results found for: TROPONINI  No results for input(s): LIPASE, AMYLASE in the last 72 hours. No results for input(s): AST, ALT, BILIDIR, BILITOT, ALKPHOS in the last 72 hours. Uric Acid:  No components found for: URIC  Urine Culture:  No components found for: CURINE    Radiology:   No results found. X-rays personally reviewed by me of none obtained today       Diagnosis Orders   1. Status post knee replacement, unspecified laterality  Ambulatory referral to Physical Therapy         PLAN:  Start more physical therapy. Continue with Tylenol for pain relief. Continue with 81 mg aspirin. Continue with CHRISTIAN hose. Continue with ice and elevation when not doing physical activity. See him back in his office in a month for repeat clinical examination and to assess if he is ready return to work    No orders of the defined types were placed in this encounter.        Procedure:        Electronically signed by Arianna Persaud PA-C on 4/19/2022 at 9:55 AM

## 2022-05-24 ENCOUNTER — OFFICE VISIT (OUTPATIENT)
Dept: ORTHOPEDIC SURGERY | Age: 62
End: 2022-05-24

## 2022-05-24 VITALS
HEART RATE: 72 BPM | DIASTOLIC BLOOD PRESSURE: 79 MMHG | SYSTOLIC BLOOD PRESSURE: 134 MMHG | BODY MASS INDEX: 31.36 KG/M2 | WEIGHT: 224 LBS | HEIGHT: 71 IN

## 2022-05-24 DIAGNOSIS — Z96.659 STATUS POST KNEE REPLACEMENT, UNSPECIFIED LATERALITY: Primary | ICD-10-CM

## 2022-05-24 PROCEDURE — 99024 POSTOP FOLLOW-UP VISIT: CPT | Performed by: PHYSICIAN ASSISTANT

## 2022-05-24 NOTE — LETTER
[facility] department of [place] [address]  Phone: [phone]  Fax: [phone]    Gretchen Gillette        May 24, 2022     Patient: Laney Kocher   YOB: 1960   Date of Visit: 5/24/2022       To Whom It May Concern: It is my medical opinion that Huan Renner should remain out of work until 7-6-22. If you have any questions or concerns, please don't hesitate to call.     Sincerely,        Moises Randhawa PA-C

## 2022-05-24 NOTE — PROGRESS NOTES
Orthopaedic Progress Note      CHIEF COMPLAINT: Right knee pain    HISTORY OF PRESENT ILLNESS:       Mr. Steve Abraham  is a 63 y.o. male  who presents with a history of right total knee replacement done about 12 weeks ago. He states therapy is going well. He lacks 2 degrees of full extension flexes to 110 degrees. Still continues with pain. He is not ready go back to work yet.       Past Medical History:    Past Medical History:   Diagnosis Date    Bunion     right    CAD (coronary artery disease)     MI, stents    Diverticul disease small and large intestine, no perforati or abscess     GERD (gastroesophageal reflux disease)     Hammer toe     right    Heart attack ([address] 11/09/2009    Hyperlipidemia     Hypertension     Pericarditis     Positive cardiac stress test     Smoking history        Past Surgical History:    Past Surgical History:   Procedure Laterality Date    CARDIAC CATHETERIZATION  02/25/2022    COLONOSCOPY  01/08/2018    diverticulosis,[id number]    CORONARY ANGIOPLASTY      stents x2    ENDOSCOPY, COLON, DIAGNOSTIC      FOOT SURGERY      JOINT REPLACEMENT Left     knee    JOINT REPLACEMENT Right 03/2022    KNEE ARTHROSCOPY  7-2006, 1-2014    TOTAL KNEE ARTHROPLASTY Left 2/26/2019    Left Total Knee Arthroplasty and injection of right knee performed by Richelle Fay MD at [address] [facility] Right 3/8/2022    Right Total Knee Arthroplasty performed by Richelle Fay MD at [address]  ENDOSCOPY           Current  Medications:  Current Outpatient Medications   Medication Sig Dispense Refill    metoprolol tartrate (LOPRESSOR) 25 MG tablet Take 1 tablet by mouth twice daily 180 tablet 3    Handicap Placard MISC by Does not apply route Expires: 3/18/2027 1 each 0    atorvastatin (LIPITOR) 40 MG tablet Take 1 tablet by mouth nightly 90 tablet 3    lisinopril (PRINIVIL;ZESTRIL) 20 MG tablet Take 1 tablet by mouth once daily 90 tablet 3    esomeprazole (NEXIUM) 20 MG delayed release capsule Take 20 mg by mouth daily      calcium carbonate 600 MG TABS tablet Take 1 tablet by mouth daily      aspirin (KAREEM ASPIRIN) 325 MG tablet Take 1 tablet by mouth daily 30 tablet 0     No current facility-administered medications for this visit. Allergies:  Ciprofloxacin    Social History:   Social History     Tobacco Use   Smoking Status Current Every Day Smoker    Packs/day: 0.25    Years: 30.00    Pack years: 7.50    Types: Cigarettes   Smokeless Tobacco Never Used     Social History     Substance and Sexual Activity   Alcohol Use Yes    Alcohol/week: 12.0 standard drinks    Types: 12 Standard drinks or equivalent per week    Comment: daily 1 beer     Social History     Substance and Sexual Activity   Drug Use No       Family History:  Family History   Problem Relation Age of Onset    Diabetes Mother     Early Death Brother 16        MVA    Early Death Sister 46        blood clot    Heart Disease Other        REVIEW OF SYSTEMS:  Constitutional: Denies any fever, chills. Musculoskeletal: Positive for right total knee replacement done about 3 months ago. PHYSICAL EXAM:  [unfilled]  General appearance:  Alert and oriented x 3. No apparent distress, appears stated age, calm and cooperative. Musculoskeletal: Right knee was inspected intubates total knee incisions well-healed. Knee motion lacks couple degrees of full extension flexion is well past 105 degrees. There is good stability no calf pain is neurovascular intact the right foot.       DATA:  CBC:   Lab Results   Component Value Date    WBC 16.4 03/09/2022    HGB 13.5 03/09/2022     03/09/2022     BMP:    Lab Results   Component Value Date     03/09/2022    K 4.4 03/09/2022     03/09/2022    CO2 24 03/09/2022    BUN 16 03/09/2022    CREATININE 0.78 03/09/2022    CALCIUM 8.5 03/09/2022    GLUCOSE 148 03/09/2022     PT/INR:    Lab Results Component Value Date    PROTIME 10.4 02/25/2019    INR 1.0 02/25/2019     Troponin:  No results found for: TROPONINI  No results for input(s): LIPASE, AMYLASE in the last 72 hours. No results for input(s): AST, ALT, BILIDIR, BILITOT, ALKPHOS in the last 72 hours. Uric Acid:  No components found for: URIC  Urine Culture:  No components found for: CURINE    Radiology:   No results found. X-rays personally reviewed by me of none obtained today       Diagnosis Orders   1. Status post knee replacement, unspecified laterality           PLAN:  Activity as tolerated, weightbearing as tolerated, keep him off work for another 6 weeks. Recommend ongoing physical therapy. We will see him back in 6 weeks time no x-rays needed just clinical examination    No orders of the defined types were placed in this encounter.        Procedure:        Electronically signed by Madi Almonte PA-C on 5/24/2022 at 8:31 AM

## 2022-05-24 NOTE — LETTER
[address] department of [place] [address]  Phone: [phone]  Fax: [phone]    [facility]        May 24, 2022     Patient: Asael Mcarthur   YOB: 1960   Date of Visit: 5/24/2022       To Whom It May Concern: It is my medical opinion that Deja Benz should remain out of work until 5/5/22. If you have any questions or concerns, please don't hesitate to call.     Sincerely,        Pastor Amairani PA-C

## 2022-06-21 ENCOUNTER — OFFICE VISIT (OUTPATIENT)
Dept: ORTHOPEDIC SURGERY | Age: 62
End: 2022-06-21
Payer: COMMERCIAL

## 2022-06-21 VITALS
HEIGHT: 71 IN | SYSTOLIC BLOOD PRESSURE: 126 MMHG | WEIGHT: 224 LBS | BODY MASS INDEX: 31.36 KG/M2 | HEART RATE: 61 BPM | DIASTOLIC BLOOD PRESSURE: 84 MMHG

## 2022-06-21 DIAGNOSIS — Z96.659 STATUS POST KNEE REPLACEMENT, UNSPECIFIED LATERALITY: Primary | ICD-10-CM

## 2022-06-21 PROCEDURE — 99213 OFFICE O/P EST LOW 20 MIN: CPT | Performed by: PHYSICIAN ASSISTANT

## 2022-06-21 RX ORDER — ACETAMINOPHEN 325 MG/1
650 TABLET ORAL PRN
COMMUNITY

## 2022-06-21 NOTE — PROGRESS NOTES
Orthopaedic Progress Note      CHIEF COMPLAINT: History of right knee replacement    HISTORY OF PRESENT ILLNESS:       Mr. Fredo Ayala  is a 63 y.o. male  who presents with a history of right total knee replacement. That was done on March 8, 2022. He comes in today stating the knee pain is better. Now he developed sciatica in his right buttocks and hip region. He is also developed this right ankle and heel pain. He still ambulating with a cane. He is not ready go back to work yet. He is finished physical therapy. He states his motion is improved significantly. He is inquiring about aquatic therapy at this time.       Past Medical History:    Past Medical History:   Diagnosis Date    Bunion     right    CAD (coronary artery disease)     MI, stents    Diverticul disease small and large intestine, no perforati or abscess     GERD (gastroesophageal reflux disease)     Hammer toe     right    Heart attack ([address] 11/09/2009    Hyperlipidemia     Hypertension     Pericarditis     Positive cardiac stress test     Smoking history        Past Surgical History:    Past Surgical History:   Procedure Laterality Date    CARDIAC CATHETERIZATION  02/25/2022    COLONOSCOPY  01/08/2018    diverticulosis,[id number]    CORONARY ANGIOPLASTY      stents x2    ENDOSCOPY, COLON, DIAGNOSTIC      FOOT SURGERY      JOINT REPLACEMENT Left     knee    JOINT REPLACEMENT Right 03/2022    KNEE ARTHROSCOPY  7-2006, 1-2014    TOTAL KNEE ARTHROPLASTY Left 2/26/2019    Left Total Knee Arthroplasty and injection of right knee performed by Sarah Nixon MD at [facility] Right 3/8/2022    Right Total Knee Arthroplasty performed by Sarah Nixon MD at  [facility]           Current  Medications:  Current Outpatient Medications   Medication Sig Dispense Refill    acetaminophen (TYLENOL) 325 MG tablet Take 650 mg by mouth as needed for Pain      metoprolol tartrate (LOPRESSOR) 25 MG tablet Take 1 tablet by mouth twice daily 180 tablet 3    Handicap Placard MISC by Does not apply route Expires: 3/18/2027 1 each 0    atorvastatin (LIPITOR) 40 MG tablet Take 1 tablet by mouth nightly 90 tablet 3    lisinopril (PRINIVIL;ZESTRIL) 20 MG tablet Take 1 tablet by mouth once daily 90 tablet 3    esomeprazole (NEXIUM) 20 MG delayed release capsule Take 20 mg by mouth daily      calcium carbonate 600 MG TABS tablet Take 1 tablet by mouth daily      aspirin (KAREEM ASPIRIN) 325 MG tablet Take 1 tablet by mouth daily 30 tablet 0     No current facility-administered medications for this visit. Allergies:  Ciprofloxacin    Social History:   Social History     Tobacco Use   Smoking Status Current Every Day Smoker    Packs/day: 0.25    Years: 30.00    Pack years: 7.50    Types: Cigarettes   Smokeless Tobacco Never Used     Social History     Substance and Sexual Activity   Alcohol Use Yes    Alcohol/week: 12.0 standard drinks    Types: 12 Standard drinks or equivalent per week    Comment: daily 1 beer     Social History     Substance and Sexual Activity   Drug Use No       Family History:  Family History   Problem Relation Age of Onset    Diabetes Mother     Early Death Brother 16        MVA    Early Death Sister 46        blood clot    Heart Disease Other        REVIEW OF SYSTEMS:  Constitutional: Denies any fever, chills. Musculoskeletal: Positive for right total knee replacement 3 months ago. PHYSICAL EXAM:  [unfilled]  General appearance:  Alert and oriented x 3. No apparent distress, appears stated age, calm and cooperative. Musculoskeletal: Right knee was inspected intubates total knee incisions well-healed. There is no erythema no increased warmth no cellulitis no real joint effusion noted. Knee motion is full extension flexion is to 115 degrees. There is good stability there is no calf pain. Right foot and ankle were inspected.   Does seem to have a little posterior tibial tendon tendinitis. He is neurovascular intact the foot. Yousuf Ra DATA:  CBC:   Lab Results   Component Value Date    WBC 16.4 03/09/2022    HGB 13.5 03/09/2022     03/09/2022     BMP:    Lab Results   Component Value Date     03/09/2022    K 4.4 03/09/2022     03/09/2022    CO2 24 03/09/2022    BUN 16 03/09/2022    CREATININE 0.78 03/09/2022    CALCIUM 8.5 03/09/2022    GLUCOSE 148 03/09/2022     PT/INR:    Lab Results   Component Value Date    PROTIME 10.4 02/25/2019    INR 1.0 02/25/2019     Troponin:  No results found for: TROPONINI  No results for input(s): LIPASE, AMYLASE in the last 72 hours. No results for input(s): AST, ALT, BILIDIR, BILITOT, ALKPHOS in the last 72 hours. Uric Acid:  No components found for: URIC  Urine Culture:  No components found for: CURINE    Radiology:   No results found. X-rays personally reviewed by me of none obtained today       Diagnosis Orders   1. Status post knee replacement, unspecified laterality  External Referral To Physical Therapy         PLAN:  I think it beneficial to him to get into aquatic therapy no restrictions they can work on his low back his knee and his ankle and foot. We will keep him off work for another month. See him back in this office no x-rays needed just clinical examination    No orders of the defined types were placed in this encounter.        Procedure:        Electronically signed by Pablito Brewer PA-C on 6/21/2022 at 8:37 AM

## 2022-06-21 NOTE — LETTER
[address] department of [place] [address]  Phone: [phone]  Fax: [phone]    Poornima Rileyedwin        June 21, 2022     Patient: Bill Johnson   YOB: 1960   Date of Visit: 6/21/2022       To Whom It May Concern: It is my medical opinion that Hamlet Garcia should remain out of work until 08/09/22. If you have any questions or concerns, please don't hesitate to call.     Sincerely,        Isela Diallo PA-C

## 2022-07-07 RX ORDER — LISINOPRIL 20 MG/1
TABLET ORAL
Qty: 90 TABLET | Refills: 0 | Status: SHIPPED | OUTPATIENT
Start: 2022-07-07 | End: 2022-09-29

## 2022-07-07 NOTE — TELEPHONE ENCOUNTER
Appears patient has reestablished with new provider, will forward to family practice.
Ashley Barron called requesting a refill of the below medication which has been pended for you:     Requested Prescriptions     Pending Prescriptions Disp Refills    lisinopril (PRINIVIL;ZESTRIL) 20 MG tablet [Pharmacy Med Name: Lisinopril 20 MG Oral Tablet] 90 tablet 0     Sig: Take 1 tablet by mouth once daily       Last Appointment Date: 2/7/2022  Next Appointment Date: Visit date not found    Allergies   Allergen Reactions    Ciprofloxacin Itching and Rash
37.6
36.6

## 2022-08-02 ENCOUNTER — OFFICE VISIT (OUTPATIENT)
Dept: ORTHOPEDIC SURGERY | Age: 62
End: 2022-08-02
Payer: COMMERCIAL

## 2022-08-02 ENCOUNTER — HOSPITAL ENCOUNTER (OUTPATIENT)
Dept: GENERAL RADIOLOGY | Age: 62
Discharge: HOME | End: 2022-08-04
Payer: COMMERCIAL

## 2022-08-02 VITALS
HEIGHT: 71 IN | BODY MASS INDEX: 31.36 KG/M2 | HEART RATE: 70 BPM | DIASTOLIC BLOOD PRESSURE: 78 MMHG | SYSTOLIC BLOOD PRESSURE: 130 MMHG | WEIGHT: 224 LBS

## 2022-08-02 DIAGNOSIS — M25.571 CHRONIC PAIN OF RIGHT ANKLE: ICD-10-CM

## 2022-08-02 DIAGNOSIS — G89.29 CHRONIC PAIN OF RIGHT ANKLE: Primary | ICD-10-CM

## 2022-08-02 DIAGNOSIS — G89.29 CHRONIC PAIN OF RIGHT ANKLE: ICD-10-CM

## 2022-08-02 DIAGNOSIS — M25.571 CHRONIC PAIN OF RIGHT ANKLE: Primary | ICD-10-CM

## 2022-08-02 DIAGNOSIS — Z96.659 STATUS POST KNEE REPLACEMENT, UNSPECIFIED LATERALITY: ICD-10-CM

## 2022-08-02 PROCEDURE — 99213 OFFICE O/P EST LOW 20 MIN: CPT | Performed by: NURSE PRACTITIONER

## 2022-08-02 PROCEDURE — 73610 X-RAY EXAM OF ANKLE: CPT

## 2022-08-02 RX ORDER — ASPIRIN 81 MG/1
81 TABLET ORAL DAILY
COMMUNITY

## 2022-08-02 NOTE — LETTER
[address] of [place] [address]  Phone: [phone]  Fax: [phone]    PAULA Seay CNP        August 2, 2022     Patient: Lanney Boas   YOB: 1960   Date of Visit: 8/2/2022       To Whom It May Concern: It is my medical opinion that Ling Johns should remain out of work until 9-7-22. If you have any questions or concerns, please don't hesitate to call.     Sincerely,        PAULA Seay CNP

## 2022-08-03 NOTE — PROGRESS NOTES
Orthopaedic Progress Note      CHIEF COMPLAINT: Right knee and ankle pain    HISTORY OF PRESENT ILLNESS:       Mr. David Zaragoza  is a 63 y.o. male  who presents with right knee and ankle pain. Patient does have a history of undergoing a right total knee replacement in March 2022. We have been following him along since surgery. He has been trying off product therapy here recently. He has also done formal physical therapy. His major complaint today is right ankle pain. He states he has pain and tightness through the back of the leg. He has also had a left total knee replacement. He states he is unable to get full extension but range of motion does look satisfactory in the office today. He has pain over his Achilles pain with stretching. He has had past issues with sciatica down the right leg in the past.  He states physical therapy was planning to order eyes: To his foot secondary to having flatfeet. He is stating he is unable to return back to work at this time.       Past Medical History:    Past Medical History:   Diagnosis Date    Bunion     right    CAD (coronary artery disease)     MI, stents    Diverticul disease small and large intestine, no perforati or abscess     GERD (gastroesophageal reflux disease)     Hammer toe     right    Heart attack ([facility] [address] 11/09/2009    Hyperlipidemia     Hypertension     Pericarditis     Positive cardiac stress test     Smoking history        Past Surgical History:    Past Surgical History:   Procedure Laterality Date    CARDIAC CATHETERIZATION  02/25/2022    COLONOSCOPY  01/08/2018    diverticulosis,[id number]    CORONARY ANGIOPLASTY      stents x2    ENDOSCOPY, COLON, DIAGNOSTIC      FOOT SURGERY      JOINT REPLACEMENT Left     knee    JOINT REPLACEMENT Right 03/2022    KNEE ARTHROSCOPY  7-2006, 1-2014    TOTAL KNEE ARTHROPLASTY Left 2/26/2019    Left Total Knee Arthroplasty and injection of right knee performed by Radha ePlletier MD at [address] [place] [address] examined. Skin is intact no rashes lesions or drainage. No redness warmth or cellulitis. Surgical wound well-healed. He does overall have a good range of motion of the right knee. He is lacking only a few degrees of full extension. He is getting about 110 degrees of flexion. Denies calf pain to palpation. He does have pain to palpation over the Achilles tendon and ankle. Sensation intact neurovascularly intact to the right foot. Marisabel Ugarte DATA:  CBC:   Lab Results   Component Value Date/Time    WBC 16.4 03/09/2022 05:07 AM    HGB 13.5 03/09/2022 05:07 AM     03/09/2022 05:07 AM     BMP:    Lab Results   Component Value Date/Time     03/09/2022 05:07 AM    K 4.4 03/09/2022 05:07 AM     03/09/2022 05:07 AM    CO2 24 03/09/2022 05:07 AM    BUN 16 03/09/2022 05:07 AM    CREATININE 0.78 03/09/2022 05:07 AM    CALCIUM 8.5 03/09/2022 05:07 AM    GLUCOSE 148 03/09/2022 05:07 AM     PT/INR:    Lab Results   Component Value Date/Time    PROTIME 10.4 02/25/2019 07:48 AM    INR 1.0 02/25/2019 07:48 AM     Troponin:  No results found for: TROPONINI  No results for input(s): LIPASE, AMYLASE in the last 72 hours. No results for input(s): AST, ALT, BILIDIR, BILITOT, ALKPHOS in the last 72 hours. Uric Acid:  No components found for: URIC  Urine Culture:  No components found for: TAMARAINE    Radiology:   XR ANKLE RIGHT (MIN 3 VIEWS)    Result Date: 8/2/2022  EXAMINATION: THREE XRAY VIEWS OF THE RIGHT ANKLE 8/2/2022 8:46 am COMPARISON: January 22, 2014 HISTORY: ORDERING SYSTEM PROVIDED HISTORY: Chronic pain of right ankle TECHNOLOGIST PROVIDED HISTORY: Reason for Exam: Pt fell a couple times in late May, pain in right calcaneus FINDINGS: No acute fracture or dislocation. Ankle mortise appears intact. No acute osseous abnormality. X-rays personally reviewed by me of x-rays 3 views of the right ankle shows no acute fractures or bony abnormalities. Ankle mortise intact. Diagnosis Orders   1. Chronic pain of right ankle  XR ANKLE RIGHT (MIN 3 VIEWS)    MRI ANKLE RIGHT WO CONTRAST            PLAN:  Plan at this time Dr. Melchor Valdivia did personally evaluate patient. He is recommending an MRI of the right ankle to assess for any other issues. We will keep patient off work for an extended time. He may continue with aquatic therapy and working on range of motion of the right knee. Will notify patient of MRI results. No orders of the defined types were placed in this encounter.        Procedure:        Electronically signed by PAULA Mccullough CNP on 8/2/2022 at 9:04 PM

## 2022-08-12 ENCOUNTER — HOSPITAL ENCOUNTER (OUTPATIENT)
Dept: MRI IMAGING | Age: 62
Discharge: HOME | End: 2022-08-14
Payer: COMMERCIAL

## 2022-08-12 DIAGNOSIS — G89.29 CHRONIC PAIN OF RIGHT ANKLE: ICD-10-CM

## 2022-08-12 DIAGNOSIS — M25.571 CHRONIC PAIN OF RIGHT ANKLE: ICD-10-CM

## 2022-08-12 PROCEDURE — 73721 MRI JNT OF LWR EXTRE W/O DYE: CPT

## 2022-08-16 DIAGNOSIS — G89.29 CHRONIC PAIN OF RIGHT ANKLE: Primary | ICD-10-CM

## 2022-08-16 DIAGNOSIS — M25.571 CHRONIC PAIN OF RIGHT ANKLE: Primary | ICD-10-CM

## 2022-08-16 RX ORDER — METHYLPREDNISOLONE 4 MG/1
TABLET ORAL
Qty: 1 KIT | Refills: 0 | Status: SHIPPED | OUTPATIENT
Start: 2022-08-16 | End: 2022-08-22

## 2022-09-08 ENCOUNTER — TELEPHONE (OUTPATIENT)
Dept: ORTHOPEDIC SURGERY | Age: 62
End: 2022-09-08

## 2022-09-08 DIAGNOSIS — M25.571 CHRONIC PAIN OF RIGHT ANKLE: Primary | ICD-10-CM

## 2022-09-08 DIAGNOSIS — G89.29 CHRONIC PAIN OF RIGHT ANKLE: Primary | ICD-10-CM

## 2022-09-08 NOTE — TELEPHONE ENCOUNTER
Patient calling asking about still being off work, he has been off from 8/2/22 till 9/7/22 his mri was good gave medrol dose pack and order for P.T., and let him know he could return to work.

## 2022-09-28 ENCOUNTER — OFFICE VISIT (OUTPATIENT)
Dept: FAMILY MEDICINE CLINIC | Age: 62
End: 2022-09-28
Payer: COMMERCIAL

## 2022-09-28 ENCOUNTER — HOSPITAL ENCOUNTER (OUTPATIENT)
Dept: LAB | Age: 62
Discharge: HOME | End: 2022-09-28
Payer: COMMERCIAL

## 2022-09-28 VITALS
WEIGHT: 236 LBS | SYSTOLIC BLOOD PRESSURE: 130 MMHG | OXYGEN SATURATION: 98 % | DIASTOLIC BLOOD PRESSURE: 76 MMHG | RESPIRATION RATE: 16 BRPM | BODY MASS INDEX: 33.04 KG/M2 | HEART RATE: 72 BPM | HEIGHT: 71 IN

## 2022-09-28 DIAGNOSIS — I25.10 CORONARY ARTERY DISEASE INVOLVING NATIVE CORONARY ARTERY OF NATIVE HEART WITHOUT ANGINA PECTORIS: ICD-10-CM

## 2022-09-28 DIAGNOSIS — K21.9 GASTROESOPHAGEAL REFLUX DISEASE WITHOUT ESOPHAGITIS: ICD-10-CM

## 2022-09-28 DIAGNOSIS — N40.1 BENIGN PROSTATIC HYPERPLASIA WITH INCOMPLETE BLADDER EMPTYING: ICD-10-CM

## 2022-09-28 DIAGNOSIS — R39.14 BENIGN PROSTATIC HYPERPLASIA WITH INCOMPLETE BLADDER EMPTYING: ICD-10-CM

## 2022-09-28 DIAGNOSIS — Z12.5 PROSTATE CANCER SCREENING: ICD-10-CM

## 2022-09-28 DIAGNOSIS — I10 PRIMARY HYPERTENSION: Primary | ICD-10-CM

## 2022-09-28 DIAGNOSIS — Z13.1 DIABETES MELLITUS SCREENING: ICD-10-CM

## 2022-09-28 LAB
ALBUMIN SERPL-MCNC: 4.3 G/DL (ref 3.5–5.2)
ALBUMIN/GLOBULIN RATIO: 1.7 (ref 1–2.5)
ALP BLD-CCNC: 83 U/L (ref 40–129)
ALT SERPL-CCNC: 18 U/L (ref 5–41)
ANION GAP SERPL CALCULATED.3IONS-SCNC: 11 MMOL/L (ref 9–17)
AST SERPL-CCNC: 19 U/L
BILIRUB SERPL-MCNC: 0.7 MG/DL (ref 0.3–1.2)
BUN BLDV-MCNC: 10 MG/DL (ref 8–23)
BUN/CREAT BLD: 13 (ref 9–20)
CALCIUM SERPL-MCNC: 10 MG/DL (ref 8.6–10.4)
CHLORIDE BLD-SCNC: 98 MMOL/L (ref 98–107)
CHOLESTEROL/HDL RATIO: 2.3
CHOLESTEROL: 116 MG/DL
CO2: 26 MMOL/L (ref 20–31)
CREAT SERPL-MCNC: 0.75 MG/DL (ref 0.7–1.2)
ESTIMATED AVERAGE GLUCOSE: 120 MG/DL
GFR AFRICAN AMERICAN: >60 ML/MIN
GFR NON-AFRICAN AMERICAN: >60 ML/MIN
GFR SERPL CREATININE-BSD FRML MDRD: NORMAL ML/MIN/{1.73_M2}
GLUCOSE BLD-MCNC: 96 MG/DL (ref 70–99)
HBA1C MFR BLD: 5.8 % (ref 4–6)
HDLC SERPL-MCNC: 51 MG/DL
LDL CHOLESTEROL: 42 MG/DL (ref 0–130)
POTASSIUM SERPL-SCNC: 4.9 MMOL/L (ref 3.7–5.3)
PROSTATE SPECIFIC ANTIGEN: 2.32 NG/ML
SODIUM BLD-SCNC: 135 MMOL/L (ref 135–144)
TOTAL PROTEIN: 6.8 G/DL (ref 6.4–8.3)
TRIGL SERPL-MCNC: 116 MG/DL

## 2022-09-28 PROCEDURE — 80061 LIPID PANEL: CPT

## 2022-09-28 PROCEDURE — 83036 HEMOGLOBIN GLYCOSYLATED A1C: CPT

## 2022-09-28 PROCEDURE — 80053 COMPREHEN METABOLIC PANEL: CPT

## 2022-09-28 PROCEDURE — 99214 OFFICE O/P EST MOD 30 MIN: CPT | Performed by: NURSE PRACTITIONER

## 2022-09-28 PROCEDURE — 36415 COLL VENOUS BLD VENIPUNCTURE: CPT

## 2022-09-28 PROCEDURE — G0103 PSA SCREENING: HCPCS

## 2022-09-28 ASSESSMENT — PATIENT HEALTH QUESTIONNAIRE - PHQ9
SUM OF ALL RESPONSES TO PHQ QUESTIONS 1-9: 0
2. FEELING DOWN, DEPRESSED OR HOPELESS: 0
SUM OF ALL RESPONSES TO PHQ QUESTIONS 1-9: 0
1. LITTLE INTEREST OR PLEASURE IN DOING THINGS: 0
SUM OF ALL RESPONSES TO PHQ9 QUESTIONS 1 & 2: 0
SUM OF ALL RESPONSES TO PHQ QUESTIONS 1-9: 0
SUM OF ALL RESPONSES TO PHQ QUESTIONS 1-9: 0

## 2022-09-28 NOTE — PROGRESS NOTES
Subjective:      Patient ID: Carolina Kapoor is a 63 y.o. male coming in for   Chief Complaint   Patient presents with    Hypertension     6 month f/u    Gastroesophageal Reflux    Knee Pain     Chronic knee pain. R knee replacement in March 2022. He is currently doing PT. Hypertension  This is a chronic problem. The problem is unchanged. The problem is controlled. Associated symptoms include peripheral edema (by the end of the day his legs will be a little swollen). Pertinent negatives include no blurred vision, chest pain, malaise/fatigue or shortness of breath. There are no associated agents to hypertension. Risk factors for coronary artery disease include male gender, obesity and dyslipidemia. Past treatments include ACE inhibitors and beta blockers (lisinopril 20mg daily, metoprolol tartrate 25mg bid). The current treatment provides significant improvement. There are no compliance problems. Hypertensive end-organ damage includes CAD/MI. Gastroesophageal Reflux  He complains of heartburn. He reports no chest pain or no globus sensation. This is a chronic problem. The problem occurs occasionally. The problem has been resolved. Treatments tried: nexium 20mg daily and tums prn. Review of Systems   Constitutional:  Negative for malaise/fatigue. Eyes:  Negative for blurred vision. Respiratory:  Negative for shortness of breath. Cardiovascular:  Negative for chest pain. Gastrointestinal:  Positive for heartburn. Genitourinary:  Positive for urgency. 1-2 times nocturia   Mild urgency at times. Trouble emptying bladder. Musculoskeletal:  Positive for arthralgias. Having some chronic knee pain to bilateral knees secondary to knee replacements. Does follow with Dr. Angeles Nixon. Objective:/76   Pulse 72   Resp 16   Ht 5' 11\" (1.803 m)   Wt 236 lb (107 kg)   SpO2 98%   BMI 32.92 kg/m²      Physical Exam     Assessment:      1. Primary hypertension    2.  Coronary artery

## 2022-09-29 ENCOUNTER — OFFICE VISIT (OUTPATIENT)
Dept: CARDIOLOGY | Age: 62
End: 2022-09-29
Payer: COMMERCIAL

## 2022-09-29 VITALS
HEIGHT: 71 IN | DIASTOLIC BLOOD PRESSURE: 80 MMHG | BODY MASS INDEX: 32.76 KG/M2 | WEIGHT: 234 LBS | SYSTOLIC BLOOD PRESSURE: 138 MMHG | HEART RATE: 64 BPM

## 2022-09-29 DIAGNOSIS — R06.02 SOB (SHORTNESS OF BREATH): ICD-10-CM

## 2022-09-29 DIAGNOSIS — I25.10 CORONARY ARTERY DISEASE INVOLVING NATIVE CORONARY ARTERY OF NATIVE HEART WITHOUT ANGINA PECTORIS: Primary | ICD-10-CM

## 2022-09-29 DIAGNOSIS — I10 ESSENTIAL HYPERTENSION: ICD-10-CM

## 2022-09-29 DIAGNOSIS — E78.5 HYPERLIPIDEMIA, UNSPECIFIED HYPERLIPIDEMIA TYPE: ICD-10-CM

## 2022-09-29 PROCEDURE — 93000 ELECTROCARDIOGRAM COMPLETE: CPT | Performed by: INTERNAL MEDICINE

## 2022-09-29 PROCEDURE — 99214 OFFICE O/P EST MOD 30 MIN: CPT | Performed by: INTERNAL MEDICINE

## 2022-09-29 RX ORDER — LISINOPRIL 20 MG/1
TABLET ORAL
Qty: 90 TABLET | Refills: 1 | Status: SHIPPED | OUTPATIENT
Start: 2022-09-29

## 2022-09-29 RX ORDER — ATORVASTATIN CALCIUM 40 MG/1
TABLET, FILM COATED ORAL
Qty: 90 TABLET | Refills: 3 | Status: SHIPPED | OUTPATIENT
Start: 2022-09-29

## 2022-09-29 ASSESSMENT — ENCOUNTER SYMPTOMS
SHORTNESS OF BREATH: 0
HEARTBURN: 1
BLURRED VISION: 0
GLOBUS SENSATION: 0

## 2022-09-29 NOTE — PROGRESS NOTES
Today's Date: 9/29/2022  Patient Name: Daiana Bush  Patient's age: 63 y.o., 1960          CC: the patient is a 63 y.o.  male is in the office for routine follow-up. He underwent right knee arthroplasty without any major perioperative complications. Recovering well. Getting physical therapy. Ambulating more. He is stable from cardiac standpoint  Denies any chest pain or dyspnea  No orthopnea, lower extremity edema or PND        Past Medical History:   has a past medical history of Bunion, CAD (coronary artery disease), Diverticul disease small and large intestine, no perforati or abscess, GERD (gastroesophageal reflux disease), Hammer toe, Heart attack ([facility] [address] Hyperlipidemia, Hypertension, Pericarditis, Positive cardiac stress test, and Smoking history. Past Surgical History:   has a past surgical history that includes Upper gastrointestinal endoscopy; Knee arthroscopy (7-2006, 1-2014); Foot surgery; Colonoscopy (01/08/2018); Total knee arthroplasty (Left, 2/26/2019); Cardiac catheterization (02/25/2022); Coronary angioplasty; joint replacement (Left); Endoscopy, colon, diagnostic; Total knee arthroplasty (Right, 3/8/2022); and joint replacement (Right, 03/2022). Home Medications:    Prior to Admission medications    Medication Sig Start Date End Date Taking? Authorizing Provider   aspirin 81 MG EC tablet Take 81 mg by mouth in the morning.    Yes Historical Provider, MD   lisinopril (PRINIVIL;ZESTRIL) 20 MG tablet Take 1 tablet by mouth once daily 7/7/22  Yes PAULA Krause CNP   acetaminophen (TYLENOL) 325 MG tablet Take 650 mg by mouth as needed for Pain   Yes Historical Provider, MD   metoprolol tartrate (LOPRESSOR) 25 MG tablet Take 1 tablet by mouth twice daily 3/31/22  Yes MD Guanakito Chamberlain MISC by Does not apply route Expires: 3/18/2027 3/18/22  Yes PAULA Krause CNP   atorvastatin (LIPITOR) 40 MG tablet Take 1 tablet by mouth nightly 9/20/21  Yes Robert Thomas DO   esomeprazole (NEXIUM) 20 MG delayed release capsule Take 20 mg by mouth daily   Yes Historical Provider, MD   calcium carbonate 600 MG TABS tablet Take 1 tablet by mouth daily   Yes Historical Provider, MD       Allergies:  Ciprofloxacin    Social History:   reports that he has been smoking cigarettes. He has a 7.50 pack-year smoking history. He has never used smokeless tobacco. He reports current alcohol use of about 12.0 standard drinks per week. He reports that he does not use drugs. Family History:    Family History   Problem Relation Age of Onset    Diabetes Mother     Early Death Brother 16        MVA    Early Death Sister 46        blood clot    Heart Disease Other        REVIEW OF SYSTEMS:  CONSTITUTIONAL:NEGATIVE  HEENT:NEG  Cardiovascular: No chest pain, Yes dyspnea on exertion (at baseline), No palpitations. Lower extremity edema: Yes  RESPIRATORY: GEORGES  GASTROINTESTINAL:  negative  GENITOURINARY:  negative  INTEGUMENT:  negative  MUSCULOSKELETAL:  positive for  pain  NEUROLOGICAL:  negative    PHYSICAL EXAM:      BP (!) 141/80   Pulse 64   Ht 5' 10.5\" (1.791 m)   Wt 234 lb (106.1 kg)   BMI 33.10 kg/m²    HEENT: PERRL, no cervical lymphadenopathy. No masses palpable. Cardiovascular: The apical impulse is not displaced  Heart  Sounds:RRR, S4  Jugular venous pulsation Normal  The carotid upstroke is normal  Peripheral pulses are symmetrical and full  Respiratory: Good respiratory effort. On auscultation: clear to auscultation bilaterally  Abdomen:  No masses or tenderness  Bowel sounds present  Extremities:   No Cyanosis or Clubbing   Lower extremity edema: Trace lower extremity edema on the right  Skin: Warm and dry    Cardiac data:    EKG: Normal sinus rhythm, first degree AVB, otherwise normal ECG    Nuclear stress test 02/15/2022  IMPRESSION:  1. Inferior infarct with moderate sized ariel-infarct ischemia. 2.  EF 57%.   Normal wall motion. 3.  Diastolic dysfunction. [facility] 02/25/2022   1. Widely patent stents in D1 and Lcx   2. Known  of RCA with left to right Collaterals   3. Normal LV systolic function. Labs:     CBC: No results for input(s): WBC, HGB, HCT, PLT in the last 72 hours. BMP:   Recent Labs     09/28/22 0912      K 4.9   CO2 26   BUN 10   CREATININE 0.75   LABGLOM >60   GLUCOSE 96     PT/INR: No results for input(s): PROTIME, INR in the last 72 hours. FASTING LIPID PANEL:  Lab Results   Component Value Date/Time    HDL 51 09/28/2022 09:12 AM    TRIG 116 09/28/2022 09:12 AM     LIVER PROFILE:  Recent Labs     09/28/22 0912   AST 19   ALT 18   LABALBU 4.3         Assessment:    CAD S/P PCI to D1 and LCX with Chronic RCA occlusion with left to right collaterals in 2016. Repeat coronary angiography in February 2022 showed widely patent stents with  of RCA. Preserved LV systolic function.   HTN  HLP  Chronic active smoking  OA   Obesity  S/p right knee arthroplasty       Patient Active Problem List   Diagnosis    Hearing loss    GERD (gastroesophageal reflux disease)    Right knee pain    HTN (hypertension)    S/P cardiac cath    Coronary artery disease involving native coronary artery of native heart without angina pectoris    Anxiety    Primary osteoarthritis of left knee    Osteoarthritis of left knee    Status post left knee replacement    CAD in native artery    Status post right knee replacement       Recommendations:  Continue current medical therapy with aspirin, Lipitor, metoprolol and lisinopril  Fasting lipid panel September 2022 reviewed, well controlled  Continue to monitor blood pressure at home  Aggressive risk factor modification including complete smoking cessation discussed with patient in detail, he verbalized understanding and will continue to cut down    Routine follow-up in 6 months or earlier if needed      Dorene Vega MD, [facility] Cardiology Consult [phone]

## 2022-09-29 NOTE — TELEPHONE ENCOUNTER
Noemi Gillette called requesting a refill of the below medication which has been pended for you:     Requested Prescriptions     Pending Prescriptions Disp Refills    lisinopril (PRINIVIL;ZESTRIL) 20 MG tablet [Pharmacy Med Name: Lisinopril 20 MG Oral Tablet] 90 tablet 1     Sig: Take 1 tablet by mouth once daily       Last Appointment Date: 9/28/2022  Next Appointment Date: 3/29/2023    Allergies   Allergen Reactions    Ciprofloxacin Itching and Rash

## 2022-10-03 DIAGNOSIS — N40.1 BENIGN PROSTATIC HYPERPLASIA WITH INCOMPLETE BLADDER EMPTYING: Primary | ICD-10-CM

## 2022-10-03 DIAGNOSIS — G89.29 CHRONIC PAIN OF RIGHT ANKLE: Primary | ICD-10-CM

## 2022-10-03 DIAGNOSIS — R39.14 BENIGN PROSTATIC HYPERPLASIA WITH INCOMPLETE BLADDER EMPTYING: Primary | ICD-10-CM

## 2022-10-03 DIAGNOSIS — M25.571 CHRONIC PAIN OF RIGHT ANKLE: Primary | ICD-10-CM

## 2022-10-03 RX ORDER — TAMSULOSIN HYDROCHLORIDE 0.4 MG/1
0.4 CAPSULE ORAL DAILY
Qty: 90 CAPSULE | Refills: 1 | Status: SHIPPED | OUTPATIENT
Start: 2022-10-03

## 2023-03-27 DIAGNOSIS — I10 PRIMARY HYPERTENSION: Primary | ICD-10-CM

## 2023-03-27 RX ORDER — LISINOPRIL 20 MG/1
TABLET ORAL
Qty: 90 TABLET | Refills: 1 | Status: SHIPPED | OUTPATIENT
Start: 2023-03-27

## 2023-03-30 ENCOUNTER — OFFICE VISIT (OUTPATIENT)
Dept: FAMILY MEDICINE CLINIC | Age: 63
End: 2023-03-30
Payer: COMMERCIAL

## 2023-03-30 ENCOUNTER — OFFICE VISIT (OUTPATIENT)
Dept: CARDIOLOGY | Age: 63
End: 2023-03-30
Payer: COMMERCIAL

## 2023-03-30 VITALS
RESPIRATION RATE: 18 BRPM | BODY MASS INDEX: 35.28 KG/M2 | SYSTOLIC BLOOD PRESSURE: 138 MMHG | DIASTOLIC BLOOD PRESSURE: 90 MMHG | WEIGHT: 252 LBS | OXYGEN SATURATION: 97 % | HEART RATE: 65 BPM | HEIGHT: 71 IN

## 2023-03-30 VITALS
HEIGHT: 71 IN | SYSTOLIC BLOOD PRESSURE: 134 MMHG | DIASTOLIC BLOOD PRESSURE: 88 MMHG | BODY MASS INDEX: 35.28 KG/M2 | WEIGHT: 252 LBS | HEART RATE: 68 BPM

## 2023-03-30 DIAGNOSIS — M13.0 ARTHRITIS OF MULTIPLE SITES: Primary | ICD-10-CM

## 2023-03-30 DIAGNOSIS — I10 PRIMARY HYPERTENSION: ICD-10-CM

## 2023-03-30 DIAGNOSIS — K21.9 GASTROESOPHAGEAL REFLUX DISEASE WITHOUT ESOPHAGITIS: ICD-10-CM

## 2023-03-30 DIAGNOSIS — R09.89 DECREASED PULSES IN FEET: ICD-10-CM

## 2023-03-30 DIAGNOSIS — I25.10 CORONARY ARTERY DISEASE INVOLVING NATIVE CORONARY ARTERY OF NATIVE HEART WITHOUT ANGINA PECTORIS: Primary | ICD-10-CM

## 2023-03-30 DIAGNOSIS — I25.10 CORONARY ARTERY DISEASE INVOLVING NATIVE CORONARY ARTERY OF NATIVE HEART WITHOUT ANGINA PECTORIS: ICD-10-CM

## 2023-03-30 DIAGNOSIS — N40.1 BENIGN PROSTATIC HYPERPLASIA WITH INCOMPLETE BLADDER EMPTYING: ICD-10-CM

## 2023-03-30 DIAGNOSIS — R39.14 BENIGN PROSTATIC HYPERPLASIA WITH INCOMPLETE BLADDER EMPTYING: ICD-10-CM

## 2023-03-30 PROCEDURE — 99214 OFFICE O/P EST MOD 30 MIN: CPT | Performed by: INTERNAL MEDICINE

## 2023-03-30 PROCEDURE — 3074F SYST BP LT 130 MM HG: CPT | Performed by: NURSE PRACTITIONER

## 2023-03-30 PROCEDURE — 93000 ELECTROCARDIOGRAM COMPLETE: CPT | Performed by: INTERNAL MEDICINE

## 2023-03-30 PROCEDURE — 3079F DIAST BP 80-89 MM HG: CPT | Performed by: INTERNAL MEDICINE

## 2023-03-30 PROCEDURE — 99214 OFFICE O/P EST MOD 30 MIN: CPT | Performed by: NURSE PRACTITIONER

## 2023-03-30 PROCEDURE — 3078F DIAST BP <80 MM HG: CPT | Performed by: NURSE PRACTITIONER

## 2023-03-30 PROCEDURE — 3075F SYST BP GE 130 - 139MM HG: CPT | Performed by: INTERNAL MEDICINE

## 2023-03-30 RX ORDER — MELOXICAM 15 MG/1
15 TABLET ORAL DAILY PRN
Qty: 30 TABLET | Refills: 5 | Status: SHIPPED | OUTPATIENT
Start: 2023-03-30

## 2023-03-30 RX ORDER — TAMSULOSIN HYDROCHLORIDE 0.4 MG/1
0.4 CAPSULE ORAL DAILY
Qty: 90 CAPSULE | Refills: 1 | Status: SHIPPED | OUTPATIENT
Start: 2023-03-30

## 2023-03-30 RX ORDER — COVID-19 ANTIGEN TEST
KIT MISCELLANEOUS
COMMUNITY
End: 2023-03-31

## 2023-03-30 ASSESSMENT — PATIENT HEALTH QUESTIONNAIRE - PHQ9
SUM OF ALL RESPONSES TO PHQ QUESTIONS 1-9: 0
SUM OF ALL RESPONSES TO PHQ QUESTIONS 1-9: 0
SUM OF ALL RESPONSES TO PHQ9 QUESTIONS 1 & 2: 0
SUM OF ALL RESPONSES TO PHQ QUESTIONS 1-9: 0
SUM OF ALL RESPONSES TO PHQ QUESTIONS 1-9: 0
1. LITTLE INTEREST OR PLEASURE IN DOING THINGS: 0
2. FEELING DOWN, DEPRESSED OR HOPELESS: 0

## 2023-03-30 NOTE — PROGRESS NOTES
Subjective:      Patient ID: Justin Spicer is a 63 y.o. male coming in for   Chief Complaint   Patient presents with    Hypertension     6 month f/u    Knee Pain     States that he had his left  knee replaced a year ago. Still having issues with his knees after replacements. States that he should have back surgery as well. He does have back pain. Medication Adjustment     Would like to discuss decreasing the amount of medication he takes if possible        HPI  6mo f/u for routine visits. HTN stable. Current reigmen lopressor 25mg bid, lisinopril 20mg daily. Not currently due for any labs at this time. Right foot numbness, always feels cold. Unsure if it is a circulation problem or more neurologic issues. Pt does have some hx of lumbar stenosis at L5-S1, last MRI done 2019. No loss of bladder/bowel control, no saddle paresthesia. Chronic arthritic, both knees, right ankle. Also suffers from chronic back pain. Does take aleve which helps some. Pt open to trialing once daily NSAID's. BPH symptoms improved with flomax 0.4mg daiy, does need a refill. PSA screening 9/28/22: 2.32. Review of Systems   Constitutional:  Negative for fatigue, fever and unexpected weight change. Respiratory:  Negative for shortness of breath. Cardiovascular:  Negative for chest pain and leg swelling. Gastrointestinal:  Negative for abdominal pain, blood in stool, constipation and diarrhea. Genitourinary:  Negative for difficulty urinating and enuresis. Musculoskeletal:  Positive for arthralgias and back pain. Skin:  Negative for color change and rash. Neurological:  Negative for dizziness, weakness, light-headedness and headaches. Objective:BP (!) 138/90   Pulse 65   Resp 18   Ht 5' 10.5\" (1.791 m)   Wt 252 lb (114.3 kg)   SpO2 97%   BMI 35.65 kg/m²      Physical Exam  Vitals and nursing note reviewed. Constitutional:       General: He is not in acute distress. Appearance: Normal appearance.

## 2023-03-30 NOTE — PROGRESS NOTES
Today's Date: 3/30/2023  Patient Name: Georgi Steinberg  Patient's age: 63 y.o., 1960        CC: the patient is a 63 y.o.  male is in the office for routine follow-up. He is stable from cardiac standpoint  Denies any chest pain or dyspnea, still have some knee pain but remains functionally active, no exertional symptoms. No orthopnea, lower extremity edema or PND      Past Medical History:   has a past medical history of Bunion, CAD (coronary artery disease), Diverticul disease small and large intestine, no perforati or abscess, GERD (gastroesophageal reflux disease), Hammer toe, Heart attack ([facility] [address] Hyperlipidemia, Hypertension, Pericarditis, Positive cardiac stress test, and Smoking history. Past Surgical History:   has a past surgical history that includes Upper gastrointestinal endoscopy; Knee arthroscopy (7-2006, 1-2014); Foot surgery; Colonoscopy (01/08/2018); Total knee arthroplasty (Left, 2/26/2019); Cardiac catheterization (02/25/2022); Coronary angioplasty; joint replacement (Left); Endoscopy, colon, diagnostic; Total knee arthroplasty (Right, 3/8/2022); and joint replacement (Right, 03/2022). Home Medications:    Prior to Admission medications    Medication Sig Start Date End Date Taking?  Authorizing Provider   Naproxen Sodium 220 MG CAPS Take by mouth   Yes Historical Provider, MD   tamsulosin (FLOMAX) 0.4 MG capsule Take 1 capsule by mouth daily 3/30/23  Yes Darwin Akers APRN - CNP   meloxicam (MOBIC) 15 MG tablet Take 1 tablet by mouth daily as needed for Pain 3/30/23  Yes PAULA Gutierres - KANDI   metoprolol tartrate (LOPRESSOR) 25 MG tablet Take 1 tablet by mouth twice daily 3/27/23  Yes Xavi Lee APRN - CNP   lisinopril (PRINIVIL;ZESTRIL) 20 MG tablet Take 1 tablet by mouth once daily 3/27/23  Yes Darwin Akers APRN - CNP   atorvastatin (LIPITOR) 40 MG tablet Take 1 tablet by mouth nightly 9/29/22  Yes April Purdy MD

## 2023-03-31 ASSESSMENT — ENCOUNTER SYMPTOMS
DIARRHEA: 0
CONSTIPATION: 0
BLOOD IN STOOL: 0
SHORTNESS OF BREATH: 0
COLOR CHANGE: 0
BACK PAIN: 1
ABDOMINAL PAIN: 0

## 2023-04-25 ENCOUNTER — TELEPHONE (OUTPATIENT)
Dept: FAMILY MEDICINE CLINIC | Age: 63
End: 2023-04-25

## 2023-09-06 ENCOUNTER — OFFICE VISIT (OUTPATIENT)
Dept: CARDIOLOGY | Age: 63
End: 2023-09-06
Payer: COMMERCIAL

## 2023-09-06 VITALS
SYSTOLIC BLOOD PRESSURE: 130 MMHG | HEART RATE: 47 BPM | DIASTOLIC BLOOD PRESSURE: 72 MMHG | WEIGHT: 261.4 LBS | BODY MASS INDEX: 36.98 KG/M2

## 2023-09-06 DIAGNOSIS — I25.10 CORONARY ARTERY DISEASE INVOLVING NATIVE CORONARY ARTERY OF NATIVE HEART WITHOUT ANGINA PECTORIS: Primary | ICD-10-CM

## 2023-09-06 DIAGNOSIS — I48.3 TYPICAL ATRIAL FLUTTER (HCC): ICD-10-CM

## 2023-09-06 DIAGNOSIS — E78.5 HYPERLIPIDEMIA, UNSPECIFIED HYPERLIPIDEMIA TYPE: ICD-10-CM

## 2023-09-06 PROCEDURE — 3075F SYST BP GE 130 - 139MM HG: CPT | Performed by: NURSE PRACTITIONER

## 2023-09-06 PROCEDURE — 3078F DIAST BP <80 MM HG: CPT | Performed by: NURSE PRACTITIONER

## 2023-09-06 PROCEDURE — 99214 OFFICE O/P EST MOD 30 MIN: CPT | Performed by: NURSE PRACTITIONER

## 2023-09-06 PROCEDURE — 93000 ELECTROCARDIOGRAM COMPLETE: CPT | Performed by: NURSE PRACTITIONER

## 2023-09-06 NOTE — PATIENT INSTRUCTIONS
Your Procedure Will Be Scheduled at:      [facility]    [address] [place], [address]   (located across from [address])    Located on the main floor of the [facility]. Report to our reception desk by the [employer]. Parking is free. Handicap parking is available by the main entrance. You may also park in [place] on Level 2 and enter building on the bridge to [facility]. Take elevator to the main floor. Our  will call you to schedule your procedure within a week. If you do not receive a phone call, please call the  directly at [phone] and leave a message, or call [place] office at [phone]. Date:______________________________    Arrival Time:________________________    Procedure Time:_____________________    Instructions:_____________________________      Bring Photo I.D. and insurance cards. Bring all Medications in the bottles. Pack an overnight bag in case you are required to spend the night. You will need someone to drive you home. The  will instruct you on holding food and drink the night before or morning of your procedure. You are to take your Medications, along with your Cardiac and/or Blood Pressure Medications, with small sips of water on the morning of your Procedure, unless instructed otherwise by your Physician. If you need additional directions please call [phone]. If you have any questions please feel free to call the [address] [place], [place] [address] office at [phone].

## 2023-09-06 NOTE — PROGRESS NOTES
02/15/2022  IMPRESSION:  1. Inferior infarct with moderate sized ariel-infarct ischemia. 2.  EF 57%. Normal wall motion. 3.  Diastolic dysfunction. [facility] 02/25/2022   1. Widely patent stents in D1 and Lcx   2. Known  of RCA with left to right Collaterals   3. Normal LV systolic function. Labs:     CBC: No results for input(s): WBC, HGB, HCT, PLT in the last 72 hours. BMP:   No results for input(s): NA, K, CO2, BUN, CREATININE, LABGLOM, GLUCOSE in the last 72 hours. PT/INR: No results for input(s): PROTIME, INR in the last 72 hours. FASTING LIPID PANEL:  Lab Results   Component Value Date/Time    HDL 51 09/28/2022 09:12 AM    TRIG 116 09/28/2022 09:12 AM       Assessment:    CAD S/P PCI to D1 and LCX with Chronic RCA occlusion with left to right collaterals in 2016. Repeat coronary angiography in February 2022 showed widely patent stents with  of RCA. Preserved LV systolic function. HTN  HLP  Chronic active smoking  OA   Obesity  S/p right knee arthroplasty   New onset aflutter       Patient Active Problem List   Diagnosis    Hearing loss    GERD (gastroesophageal reflux disease)    Right knee pain    HTN (hypertension)    S/P cardiac cath    Coronary artery disease involving native coronary artery of native heart without angina pectoris    Anxiety    Primary osteoarthritis of left knee    Osteoarthritis of left knee    Status post left knee replacement    CAD in native artery    Status post right knee replacement       Recommendations:    New onset aflutter, HR 40's. Will decrease lopressor to 12.5mg BID. Add eliquis 5mg BID. Long discussion with pt. Will proceed with DIONNE/CV. Risks and benefits reviewed and pt is agreeable to proceed. Hx of CAD.  Continue current medical therapy with aspirin, Lipitor, metoprolol and lisinopril  Aggressive risk factor modification including complete smoking cessation discussed with patient in detail, he verbalized understanding and will continue to cut

## 2023-09-08 ENCOUNTER — OFFICE VISIT (OUTPATIENT)
Dept: FAMILY MEDICINE CLINIC | Age: 63
End: 2023-09-08
Payer: COMMERCIAL

## 2023-09-08 ENCOUNTER — TELEPHONE (OUTPATIENT)
Dept: GENERAL RADIOLOGY | Age: 63
End: 2023-09-08

## 2023-09-08 VITALS
BODY MASS INDEX: 36.22 KG/M2 | OXYGEN SATURATION: 97 % | WEIGHT: 253 LBS | SYSTOLIC BLOOD PRESSURE: 130 MMHG | HEIGHT: 70 IN | HEART RATE: 50 BPM | DIASTOLIC BLOOD PRESSURE: 70 MMHG

## 2023-09-08 DIAGNOSIS — I25.10 CORONARY ARTERY DISEASE INVOLVING NATIVE CORONARY ARTERY OF NATIVE HEART WITHOUT ANGINA PECTORIS: ICD-10-CM

## 2023-09-08 DIAGNOSIS — R09.89 DECREASED PULSES IN FEET: ICD-10-CM

## 2023-09-08 DIAGNOSIS — Z12.5 PROSTATE CANCER SCREENING: Primary | ICD-10-CM

## 2023-09-08 DIAGNOSIS — L30.9 DERMATITIS: ICD-10-CM

## 2023-09-08 DIAGNOSIS — I48.92 ATRIAL FLUTTER, UNSPECIFIED TYPE (HCC): ICD-10-CM

## 2023-09-08 DIAGNOSIS — Z13.1 DIABETES MELLITUS SCREENING: ICD-10-CM

## 2023-09-08 DIAGNOSIS — I25.10 CORONARY ARTERY DISEASE INVOLVING NATIVE CORONARY ARTERY OF NATIVE HEART WITHOUT ANGINA PECTORIS: Primary | ICD-10-CM

## 2023-09-08 PROCEDURE — 3078F DIAST BP <80 MM HG: CPT | Performed by: NURSE PRACTITIONER

## 2023-09-08 PROCEDURE — 99214 OFFICE O/P EST MOD 30 MIN: CPT | Performed by: NURSE PRACTITIONER

## 2023-09-08 PROCEDURE — 3075F SYST BP GE 130 - 139MM HG: CPT | Performed by: NURSE PRACTITIONER

## 2023-09-08 RX ORDER — CLOBETASOL PROPIONATE 0.5 MG/G
OINTMENT TOPICAL
Qty: 30 G | Refills: 0 | Status: SHIPPED | OUTPATIENT
Start: 2023-09-08

## 2023-09-08 SDOH — ECONOMIC STABILITY: FOOD INSECURITY: WITHIN THE PAST 12 MONTHS, THE FOOD YOU BOUGHT JUST DIDN'T LAST AND YOU DIDN'T HAVE MONEY TO GET MORE.: NEVER TRUE

## 2023-09-08 SDOH — ECONOMIC STABILITY: HOUSING INSECURITY
IN THE LAST 12 MONTHS, WAS THERE A TIME WHEN YOU DID NOT HAVE A STEADY PLACE TO SLEEP OR SLEPT IN A SHELTER (INCLUDING NOW)?: NO

## 2023-09-08 SDOH — ECONOMIC STABILITY: FOOD INSECURITY: WITHIN THE PAST 12 MONTHS, YOU WORRIED THAT YOUR FOOD WOULD RUN OUT BEFORE YOU GOT MONEY TO BUY MORE.: NEVER TRUE

## 2023-09-08 SDOH — ECONOMIC STABILITY: INCOME INSECURITY: HOW HARD IS IT FOR YOU TO PAY FOR THE VERY BASICS LIKE FOOD, HOUSING, MEDICAL CARE, AND HEATING?: NOT HARD AT ALL

## 2023-09-08 NOTE — TELEPHONE ENCOUNTER
Tennille Keating will need a Creat/GFR prior to his CTA if you can please enter an order.   Thanks,  [place] [place]  [facility]

## 2023-09-09 ENCOUNTER — HOSPITAL ENCOUNTER (OUTPATIENT)
Age: 63
Discharge: HOME | End: 2023-09-09
Payer: COMMERCIAL

## 2023-09-09 DIAGNOSIS — Z13.1 DIABETES MELLITUS SCREENING: ICD-10-CM

## 2023-09-09 DIAGNOSIS — Z12.5 PROSTATE CANCER SCREENING: ICD-10-CM

## 2023-09-09 DIAGNOSIS — I25.10 CORONARY ARTERY DISEASE INVOLVING NATIVE CORONARY ARTERY OF NATIVE HEART WITHOUT ANGINA PECTORIS: ICD-10-CM

## 2023-09-09 LAB
ALBUMIN SERPL-MCNC: 4.1 G/DL (ref 3.5–5.2)
ALBUMIN/GLOB SERPL: 1.3 {RATIO} (ref 1–2.5)
ALP SERPL-CCNC: 116 U/L (ref 40–129)
ALT SERPL-CCNC: 29 U/L (ref 5–41)
ANION GAP SERPL CALCULATED.3IONS-SCNC: 8 MMOL/L (ref 9–17)
AST SERPL-CCNC: 25 U/L
BILIRUB SERPL-MCNC: 0.6 MG/DL (ref 0.3–1.2)
BUN SERPL-MCNC: 14 MG/DL (ref 8–23)
BUN/CREAT SERPL: 16 (ref 9–20)
CALCIUM SERPL-MCNC: 9.2 MG/DL (ref 8.6–10.4)
CHLORIDE SERPL-SCNC: 101 MMOL/L (ref 98–107)
CHOLEST SERPL-MCNC: 103 MG/DL
CHOLESTEROL/HDL RATIO: 2.4
CO2 SERPL-SCNC: 26 MMOL/L (ref 20–31)
CREAT SERPL-MCNC: 0.9 MG/DL (ref 0.7–1.2)
GFR SERPL CREATININE-BSD FRML MDRD: >60 ML/MIN/1.73M2
GLUCOSE SERPL-MCNC: 107 MG/DL (ref 70–99)
HDLC SERPL-MCNC: 43 MG/DL
LDLC SERPL CALC-MCNC: 48 MG/DL (ref 0–130)
POTASSIUM SERPL-SCNC: 4.8 MMOL/L (ref 3.7–5.3)
PROT SERPL-MCNC: 7.2 G/DL (ref 6.4–8.3)
PSA SERPL-MCNC: 2.49 NG/ML
SODIUM SERPL-SCNC: 135 MMOL/L (ref 135–144)
TRIGL SERPL-MCNC: 59 MG/DL

## 2023-09-09 PROCEDURE — 36415 COLL VENOUS BLD VENIPUNCTURE: CPT

## 2023-09-09 PROCEDURE — 80061 LIPID PANEL: CPT

## 2023-09-09 PROCEDURE — 80053 COMPREHEN METABOLIC PANEL: CPT

## 2023-09-09 PROCEDURE — 83036 HEMOGLOBIN GLYCOSYLATED A1C: CPT

## 2023-09-09 PROCEDURE — G0103 PSA SCREENING: HCPCS

## 2023-09-10 LAB
EST. AVERAGE GLUCOSE BLD GHB EST-MCNC: 137 MG/DL
HBA1C MFR BLD: 6.4 % (ref 4–6)

## 2023-09-14 ENCOUNTER — HOSPITAL ENCOUNTER (OUTPATIENT)
Dept: CT IMAGING | Age: 63
Discharge: HOME | End: 2023-09-16
Payer: COMMERCIAL

## 2023-09-14 ENCOUNTER — TELEPHONE (OUTPATIENT)
Dept: FAMILY MEDICINE CLINIC | Age: 63
End: 2023-09-14

## 2023-09-14 DIAGNOSIS — I25.10 CORONARY ARTERY DISEASE INVOLVING NATIVE CORONARY ARTERY OF NATIVE HEART WITHOUT ANGINA PECTORIS: ICD-10-CM

## 2023-09-14 DIAGNOSIS — R09.89 DECREASED PULSES IN FEET: ICD-10-CM

## 2023-09-14 PROCEDURE — 74174 CTA ABD&PLVS W/CONTRAST: CPT

## 2023-09-14 PROCEDURE — 6360000004 HC RX CONTRAST MEDICATION: Performed by: NURSE PRACTITIONER

## 2023-09-14 RX ADMIN — IOPAMIDOL 100 ML: 755 INJECTION, SOLUTION INTRAVENOUS at 08:24

## 2023-09-14 NOTE — TELEPHONE ENCOUNTER
Patient aware of lab results. Wants to know what you thinl of Keto gummie for weight loss. Call [phone] with recomendation

## 2023-09-15 RX ORDER — ATORVASTATIN CALCIUM 40 MG/1
TABLET, FILM COATED ORAL
Qty: 90 TABLET | Refills: 3 | Status: SHIPPED | OUTPATIENT
Start: 2023-09-15

## 2023-09-18 NOTE — TELEPHONE ENCOUNTER
----- Message from PAULA Peralta CNP sent at 9/18/2023  3:38 PM EDT -----  Please let Laurel Odonnell know his CT showed mild-mod calcification to aorta and iliac arteries without significant stenosis. Things to help control this are, cholesterol medications, a baby aspirin daily and stopping smoking. Secondary findings of extensive diverticulosis, he is due for a repeat colonoscopy this year. I would recommend him doing it prior to years end.

## 2023-09-19 ENCOUNTER — OFFICE VISIT (OUTPATIENT)
Dept: OPERATING ROOM | Age: 63
End: 2023-09-19

## 2023-09-19 ENCOUNTER — HOSPITAL ENCOUNTER (OUTPATIENT)
Age: 63
Setting detail: OUTPATIENT SURGERY
Discharge: HOME | End: 2023-09-21
Payer: COMMERCIAL

## 2023-09-19 VITALS
OXYGEN SATURATION: 89 % | HEART RATE: 42 BPM | RESPIRATION RATE: 15 BRPM | DIASTOLIC BLOOD PRESSURE: 61 MMHG | HEIGHT: 70 IN | WEIGHT: 253 LBS | SYSTOLIC BLOOD PRESSURE: 144 MMHG | BODY MASS INDEX: 36.22 KG/M2 | TEMPERATURE: 98.2 F

## 2023-09-19 DIAGNOSIS — I48.91 A-FIB (HCC): Primary | ICD-10-CM

## 2023-09-19 DIAGNOSIS — I48.3 TYPICAL ATRIAL FLUTTER (HCC): ICD-10-CM

## 2023-09-19 LAB
BUN BLD-MCNC: 13 MG/DL (ref 8–26)
CHLORIDE BLD-SCNC: 104 MMOL/L (ref 98–107)
ECHO BSA: 2.38 M2
EGFR, POC: >60 ML/MIN/1.73M2
GLUCOSE BLD-MCNC: 104 MG/DL (ref 74–100)
HCT VFR BLD AUTO: 52 % (ref 41–53)
POC CREATININE: 0.5 MG/DL (ref 0.51–1.19)
POC HEMOGLOBIN (CALC): 17.8 G/DL (ref 13.5–17.5)
POTASSIUM BLD-SCNC: 5 MMOL/L (ref 3.5–4.5)
SODIUM BLD-SCNC: 136 MMOL/L (ref 138–146)

## 2023-09-19 PROCEDURE — 2580000003 HC RX 258: Performed by: INTERNAL MEDICINE

## 2023-09-19 PROCEDURE — 82947 ASSAY GLUCOSE BLOOD QUANT: CPT

## 2023-09-19 PROCEDURE — 84520 ASSAY OF UREA NITROGEN: CPT

## 2023-09-19 PROCEDURE — 93312 ECHO TRANSESOPHAGEAL: CPT

## 2023-09-19 PROCEDURE — 6360000002 HC RX W HCPCS: Performed by: STUDENT IN AN ORGANIZED HEALTH CARE EDUCATION/TRAINING PROGRAM

## 2023-09-19 PROCEDURE — 82435 ASSAY OF BLOOD CHLORIDE: CPT

## 2023-09-19 PROCEDURE — 84132 ASSAY OF SERUM POTASSIUM: CPT

## 2023-09-19 PROCEDURE — 93005 ELECTROCARDIOGRAM TRACING: CPT | Performed by: INTERNAL MEDICINE

## 2023-09-19 PROCEDURE — 7100000011 HC PHASE II RECOVERY - ADDTL 15 MIN: Performed by: STUDENT IN AN ORGANIZED HEALTH CARE EDUCATION/TRAINING PROGRAM

## 2023-09-19 PROCEDURE — 93312 ECHO TRANSESOPHAGEAL: CPT | Performed by: INTERNAL MEDICINE

## 2023-09-19 PROCEDURE — 99152 MOD SED SAME PHYS/QHP 5/>YRS: CPT | Performed by: STUDENT IN AN ORGANIZED HEALTH CARE EDUCATION/TRAINING PROGRAM

## 2023-09-19 PROCEDURE — 6370000000 HC RX 637 (ALT 250 FOR IP): Performed by: STUDENT IN AN ORGANIZED HEALTH CARE EDUCATION/TRAINING PROGRAM

## 2023-09-19 PROCEDURE — 7100000010 HC PHASE II RECOVERY - FIRST 15 MIN: Performed by: STUDENT IN AN ORGANIZED HEALTH CARE EDUCATION/TRAINING PROGRAM

## 2023-09-19 PROCEDURE — 85014 HEMATOCRIT: CPT

## 2023-09-19 PROCEDURE — 82565 ASSAY OF CREATININE: CPT

## 2023-09-19 PROCEDURE — 84295 ASSAY OF SERUM SODIUM: CPT

## 2023-09-19 RX ORDER — SODIUM CHLORIDE 9 MG/ML
INJECTION, SOLUTION INTRAVENOUS CONTINUOUS
Status: DISCONTINUED | OUTPATIENT
Start: 2023-09-19 | End: 2023-09-22 | Stop reason: HOSPADM

## 2023-09-19 RX ORDER — MIDAZOLAM HYDROCHLORIDE 1 MG/ML
INJECTION INTRAMUSCULAR; INTRAVENOUS PRN
Status: COMPLETED | OUTPATIENT
Start: 2023-09-19 | End: 2023-09-19

## 2023-09-19 RX ORDER — LIDOCAINE HYDROCHLORIDE 20 MG/ML
SOLUTION OROPHARYNGEAL PRN
Status: COMPLETED | OUTPATIENT
Start: 2023-09-19 | End: 2023-09-19

## 2023-09-19 RX ORDER — FENTANYL CITRATE 50 UG/ML
INJECTION, SOLUTION INTRAMUSCULAR; INTRAVENOUS PRN
Status: COMPLETED | OUTPATIENT
Start: 2023-09-19 | End: 2023-09-19

## 2023-09-19 RX ADMIN — SODIUM CHLORIDE: 9 INJECTION, SOLUTION INTRAVENOUS at 10:41

## 2023-09-19 RX ADMIN — MIDAZOLAM 2 MG: 1 INJECTION INTRAMUSCULAR; INTRAVENOUS at 13:20

## 2023-09-19 RX ADMIN — BENZOCAINE, BUTAMBEN, AND TETRACAINE HYDROCHLORIDE 3 SPRAY: .028; .004; .004 AEROSOL, SPRAY TOPICAL at 13:16

## 2023-09-19 RX ADMIN — FENTANYL CITRATE 25 MCG: 50 INJECTION, SOLUTION INTRAMUSCULAR; INTRAVENOUS at 13:20

## 2023-09-19 RX ADMIN — LIDOCAINE HYDROCHLORIDE 10 ML: 20 SOLUTION ORAL; TOPICAL at 13:14

## 2023-09-19 RX ADMIN — MIDAZOLAM 1 MG: 1 INJECTION INTRAMUSCULAR; INTRAVENOUS at 13:24

## 2023-09-19 NOTE — PROGRESS NOTES
Patient admitted, consent signed and questions answered. Patient ready for procedure. Call light to reach with side rails up 2 of 2. Family at bedside with patient. History and physical complete.

## 2023-09-19 NOTE — PROGRESS NOTES
Received post DIONNE procedure to [facility] room 12. Assessment obtained. Restrictions reviewed with patient. Post procedure pathway initiated. Family at side. Patient without complaints.

## 2023-09-19 NOTE — DISCHARGE INSTRUCTIONS
personal, legal or business decisions for 24 hours. \" You may experience dizziness or lightheadedness. Move slowly and carefully, do not make sudden position changes. \" Drink extra amounts of fluids today. \" Increase your diet as tolerated (unless you have received specific instructions from your doctor). \" If you feel nauseated, continue with liquids until the nausea is gone. \" Notify your physician if you have not urinated within 8 hours after the procedure. \" Resume your medications unless otherwise instructed.

## 2023-09-19 NOTE — H&P
S/P PCI to D1 and LCX with Chronic RCA occlusion with left to right collaterals in 2016. Repeat coronary angiography in February 2022 showed widely patent stents with  of RCA. Preserved LV systolic function. HTN  HLP  Chronic active smoking  OA   Obesity  S/p right knee arthroplasty   New onset aflutter     Assessment:  Atrial flutter, new onset on Eliquis and lopressor  CAD on ASA, lipitor, BB, lisinopril    Plan:  Proceed with planned DIONNE/CV procedure. Further orders to follow. Risks, benefits, alternatives, and details discussed extensively. Accepts and consents.       Electronically signed on 09/19/23 at 11:32 AM by:    Tricia Mon MD, MD   Fellow, [address] [place] [address]

## 2023-09-19 NOTE — OP NOTE
[facility]  Transesophageal Echocardiogram       Today's Date:  9/19/2023  Indication:   New onset a flutter    Operators:  Primary:   Dr Lore Muñoz (Attending Physician)  Assistant:   Carolina Posadas MD (Cardiovascular Fellow)       Pre Procedure Conscious Sedation Data:    ASA Class:    [] I [x] II [] III [] IV    Mallampati Class:  [] I [x] II [] III [] IV    Procedure:    Patient seen and examined. History and Physical reviewed. Labs reviewed. After informed consent was obtained with explanation of the risks and benefits, the patient was brought to cardiac cath lab. All sedation was administered by the cardiologist. The oropharynx was pre-anesthesized with viscous lidocaine and cetacaine spray. The ultrasound probe was passed without any difficulty. DIONNE findings:    LA:  Dilated  BRIDGER:  Echogenic density noted in BRIDGER, likely thrombus  RA:  Dilated  RV:   Normal  LV:  Normal  Estimated LVEF:  55%  Aorta:   Mild atheromatous disease arch  Pericardium: No pericardial effusion  Septum:  No intracardiac shunt via color Doppler. Echogenic mass noted in intra-atrial septum, likely benign lipomatous hypertrophy    Valves:    Mitral Valve: Structurally normal. Mild regurgitation is identified. Aortic Valve: The aortic valve is trileaflet and opens adequately. No regurgitiation is identified. Tricuspid valve: Structurally normal. Mild regurgitation is identified. Pulmonary valve: Normal. No significant regurgitation    No valvular vegetations or thrombus identified. Summary:     1. A DIONNE was performed without complications. 2. LVEF 55%  3. Echogenic density noted in BRIDGER, likely thrombus  4. Echogenic mass noted in intra-atrial septum, likely benign lipomatous hypertrophy  5. There were no complications encountered. 6. Cardioversion aborted. Recommend AC for 6 weeks and re-evaluate with DIONNE prior to cardioversion.        Electronically signed by Carolina Posadas MD on 9/19/2023 at 1:36

## 2023-09-19 NOTE — PROGRESS NOTES
TRANSFER - OUT REPORT:    Verbal report given to [facility] [address] on [employer] being transferred to [facility] for routine post-op       Report consisted of patient's Situation, Background, Assessment and   Recommendations(SBAR). Information from the following report(s) Nurse Handoff Report was reviewed with the receiving nurse. Opportunity for questions and clarification was provided.       Patient transported with:   Registered Nurse

## 2023-09-20 LAB
EKG ATRIAL RATE: 300 BPM
EKG P AXIS: -62 DEGREES
EKG Q-T INTERVAL: 462 MS
EKG QRS DURATION: 84 MS
EKG QTC CALCULATION (BAZETT): 390 MS
EKG R AXIS: 33 DEGREES
EKG T AXIS: 48 DEGREES
EKG VENTRICULAR RATE: 43 BPM

## 2023-09-20 PROCEDURE — 93010 ELECTROCARDIOGRAM REPORT: CPT | Performed by: INTERNAL MEDICINE

## 2023-09-23 DIAGNOSIS — R39.14 BENIGN PROSTATIC HYPERPLASIA WITH INCOMPLETE BLADDER EMPTYING: ICD-10-CM

## 2023-09-23 DIAGNOSIS — I10 PRIMARY HYPERTENSION: ICD-10-CM

## 2023-09-23 DIAGNOSIS — N40.1 BENIGN PROSTATIC HYPERPLASIA WITH INCOMPLETE BLADDER EMPTYING: ICD-10-CM

## 2023-09-25 ENCOUNTER — TELEPHONE (OUTPATIENT)
Dept: CARDIOLOGY | Age: 63
End: 2023-09-25

## 2023-09-25 ENCOUNTER — OFFICE VISIT (OUTPATIENT)
Age: 63
End: 2023-09-25
Payer: COMMERCIAL

## 2023-09-25 ENCOUNTER — TELEPHONE (OUTPATIENT)
Dept: VASCULAR SURGERY | Age: 63
End: 2023-09-25

## 2023-09-25 VITALS
HEIGHT: 70 IN | TEMPERATURE: 98.1 F | RESPIRATION RATE: 20 BRPM | SYSTOLIC BLOOD PRESSURE: 135 MMHG | WEIGHT: 254 LBS | HEART RATE: 47 BPM | OXYGEN SATURATION: 93 % | DIASTOLIC BLOOD PRESSURE: 73 MMHG | BODY MASS INDEX: 36.36 KG/M2

## 2023-09-25 DIAGNOSIS — I51.3 THROMBUS OF LEFT ATRIAL APPENDAGE: Primary | ICD-10-CM

## 2023-09-25 DIAGNOSIS — I51.3 THROMBUS OF LEFT ATRIAL APPENDAGE: ICD-10-CM

## 2023-09-25 DIAGNOSIS — I48.92 ATRIAL FIBRILLATION AND FLUTTER (HCC): Primary | ICD-10-CM

## 2023-09-25 DIAGNOSIS — I48.91 ATRIAL FIBRILLATION AND FLUTTER (HCC): Primary | ICD-10-CM

## 2023-09-25 DIAGNOSIS — M13.0 ARTHRITIS OF MULTIPLE SITES: ICD-10-CM

## 2023-09-25 PROCEDURE — 3078F DIAST BP <80 MM HG: CPT | Performed by: SPECIALIST

## 2023-09-25 PROCEDURE — 99204 OFFICE O/P NEW MOD 45 MIN: CPT | Performed by: SPECIALIST

## 2023-09-25 PROCEDURE — 3075F SYST BP GE 130 - 139MM HG: CPT | Performed by: SPECIALIST

## 2023-09-25 RX ORDER — MELOXICAM 15 MG/1
TABLET ORAL
Qty: 30 TABLET | Refills: 0 | OUTPATIENT
Start: 2023-09-25

## 2023-09-25 RX ORDER — LISINOPRIL 20 MG/1
TABLET ORAL
Qty: 90 TABLET | Refills: 1 | Status: SHIPPED | OUTPATIENT
Start: 2023-09-25

## 2023-09-25 RX ORDER — TAMSULOSIN HYDROCHLORIDE 0.4 MG/1
0.4 CAPSULE ORAL DAILY
Qty: 90 CAPSULE | Refills: 1 | Status: SHIPPED | OUTPATIENT
Start: 2023-09-25

## 2023-09-25 ASSESSMENT — ENCOUNTER SYMPTOMS
GASTROINTESTINAL NEGATIVE: 1
STRIDOR: 1
APNEA: 1
ALLERGIC/IMMUNOLOGIC NEGATIVE: 1
EYES NEGATIVE: 1
SHORTNESS OF BREATH: 1

## 2023-09-25 NOTE — TELEPHONE ENCOUNTER
Spoke to Abril ji at the [facility] office for TCC and informed her that according to the OP note on 09/19/2023 from Dr. Renzo Huerta MD, the patient is to follow up with their office in 6 weeks with an DIONNE and Dr. Moris Ware would like to see the patient in 8 weeks. Patient's wife states they still have not heard from the TCC office to schedule this. Abril ji states they were not aware the patient needed this scheduled and will talk with the doctor and get the patient scheduled for the DIONNE.

## 2023-09-25 NOTE — TELEPHONE ENCOUNTER
Ap Wagner called requesting a refill of the below medication which has been pended for you:     Requested Prescriptions     Pending Prescriptions Disp Refills    lisinopril (PRINIVIL;ZESTRIL) 20 MG tablet [Pharmacy Med Name: Lisinopril 20 MG Oral Tablet] 90 tablet 0     Sig: Take 1 tablet by mouth once daily    tamsulosin (FLOMAX) 0.4 MG capsule [Pharmacy Med Name: Tamsulosin HCl 0.4 MG Oral Capsule] 90 capsule 0     Sig: Take 1 capsule by mouth once daily       Last Appointment Date: 9/8/2023  Next Appointment Date: 12/8/2023    Allergies   Allergen Reactions    Ciprofloxacin Itching and Rash

## 2023-09-25 NOTE — TELEPHONE ENCOUNTER
Dr Matt Hatch office called stating that they seen the patient today in office and are going to follow up with patient in 8 weeks but are concerned that the patient will need another DIONNE done before then according to last DIONNE OP note from 9/19/23. Summary: 9/19/23     1. A DIONNE was performed without complications. 2. LVEF 55%  3. Echogenic density noted in BRIDGER, likely thrombus  4. Echogenic mass noted in intra-atrial septum, likely benign lipomatous hypertrophy  5. There were no complications encountered. 6. Cardioversion aborted. Recommend AC for 6 weeks and re-evaluate with DIONNE prior to cardioversion. Please advise on if patient does need another DIONNE prior to EP visit and order if so.

## 2023-09-25 NOTE — PROGRESS NOTES
[address] [address]     Date of Visit:  2023  Patient Name: Liliane Fritz   Patient :  1960     CHIEF COMPLAINT/HPI:     Liliane Fritz is a 66 y.o. male who presents today for an general visit to be evaluated for the following condition(s):  Chief Complaint   Patient presents with    New Patient     Consult from Dr. Audie Angel for [facility], discuss possible ablation     66years old gentleman who was seen on a routine appointment with the cardiologist and was found to be in atrial flutter/fibrillation and slow ventricular response. The gentleman himself reports symptoms of increased fatigue since probably May. He was not aware of the atrial fibrillation. This is his first encounter with atrial fibrillation ever known to him. Its not clear that he feels palpitation or the irregularity. Patient was a started in the office on Eliquis 5 mg p.o. twice daily and because he was not already anticoagulated prior to that with the bradycardia, he was advised to have a DIONNE which was done and the DIONNE is reported left atrial appendage clot so no cardioversion was done and no antiarrhythmics was started and the patient was asked to see me. The gentleman continues to have the tiredness and fatigue and in his mind this is probably what caused by the A-fib    No history of thromboembolic events from the clot reported. There is plans to repeat the DIONNE in 6 weeks or about. Meanwhile the gentleman is taking his Eliquis as he is supposed to. The gentleman is obese. He drinks alcohol excessively it sounds like there is a degree of dependence. There is clear symptoms to suggest obstructive sleep apnea. There is family history on his father side of atrial fibrillation. He is a farmer.   And [place] and [place] and the harvest.    Past history is

## 2023-09-26 DIAGNOSIS — M13.0 ARTHRITIS OF MULTIPLE SITES: ICD-10-CM

## 2023-09-26 DIAGNOSIS — G47.33 OSA (OBSTRUCTIVE SLEEP APNEA): Primary | ICD-10-CM

## 2023-09-26 RX ORDER — MELOXICAM 15 MG/1
TABLET ORAL
Qty: 30 TABLET | Refills: 0 | OUTPATIENT
Start: 2023-09-26

## 2023-09-26 NOTE — TELEPHONE ENCOUNTER
Pt calling stating sleep study was discussed at last visit and pt has decided he would like to proceed with that, please advise once referral is placed. Pt also is requesting a refill of pended med, it was refused stating med d/c'd but pt states he's been taking it all along, pt questions if he is not supposed to take it or if he can continue, will you refill.

## 2023-09-27 RX ORDER — MELOXICAM 15 MG/1
15 TABLET ORAL DAILY PRN
Qty: 30 TABLET | Refills: 5 | Status: SHIPPED | OUTPATIENT
Start: 2023-09-27

## 2023-09-27 NOTE — TELEPHONE ENCOUNTER
Please also make sure Kt Chanel uses meloxicam sparingly and I recommend using daily nexium and or PPI to prevent any GI bleeding due to eliquis therapy.

## 2023-09-29 ENCOUNTER — TELEPHONE (OUTPATIENT)
Dept: CARDIOLOGY | Age: 63
End: 2023-09-29

## 2023-09-29 NOTE — TELEPHONE ENCOUNTER
Writer called and spoke to patient. Advised pt to continue taking current medications and to not restart the aspirin as he is already taking Eliquis. Patient verbalized understanding and had no further questions at the time.

## 2023-09-29 NOTE — TELEPHONE ENCOUNTER
Patient had a DIONNE done on 9/19 and he unsure if he should start taking ASA again. Patient is very confused on what medication he should be taking/not taking. Please advise.      [phone]    Last Appt:  9/6/2023  Next Appt:   3/7/2024  Med verified in [address] [place] [address]

## 2023-11-01 RX ORDER — ATORVASTATIN CALCIUM 40 MG/1
40 TABLET, FILM COATED ORAL NIGHTLY
Qty: 90 TABLET | Refills: 3 | Status: SHIPPED | OUTPATIENT
Start: 2023-11-01

## 2023-11-01 NOTE — TELEPHONE ENCOUNTER
Last Appt:  9/6/2023  Next Appt:   3/7/2024  Med verified in [address] [place] [address] 15

## 2023-11-14 ENCOUNTER — HOSPITAL ENCOUNTER (INPATIENT)
Age: 63
LOS: 3 days | Discharge: HOME | End: 2023-11-17
Attending: INTERNAL MEDICINE | Admitting: INTERNAL MEDICINE
Payer: COMMERCIAL

## 2023-11-14 ENCOUNTER — OFFICE VISIT (OUTPATIENT)
Dept: OPERATING ROOM | Age: 63
End: 2023-11-14
Attending: INTERNAL MEDICINE

## 2023-11-14 DIAGNOSIS — I82.90 THROMBUS: ICD-10-CM

## 2023-11-14 DIAGNOSIS — I51.3 THROMBUS OF LEFT ATRIAL APPENDAGE: ICD-10-CM

## 2023-11-14 DIAGNOSIS — I48.91 ATRIAL FIBRILLATION AND FLUTTER (HCC): Primary | ICD-10-CM

## 2023-11-14 DIAGNOSIS — I48.92 ATRIAL FIBRILLATION AND FLUTTER (HCC): Primary | ICD-10-CM

## 2023-11-14 DIAGNOSIS — I48.91 ATRIAL FIBRILLATION, UNSPECIFIED TYPE (HCC): ICD-10-CM

## 2023-11-14 PROBLEM — Z79.899 ENCOUNTER FOR MONITORING ANTI-ARRHYTHMIC THERAPY: Status: ACTIVE | Noted: 2023-11-14

## 2023-11-14 PROBLEM — Z51.81 ENCOUNTER FOR MONITORING ANTI-ARRHYTHMIC THERAPY: Status: ACTIVE | Noted: 2023-11-14

## 2023-11-14 LAB
BUN BLD-MCNC: 14 MG/DL (ref 8–26)
CHLORIDE BLD-SCNC: 104 MMOL/L (ref 98–107)
ECHO BSA: 2.39 M2
ECHO BSA: 2.39 M2
EGFR, POC: >60 ML/MIN/1.73M2
GLUCOSE BLD-MCNC: 103 MG/DL (ref 74–100)
HCT VFR BLD AUTO: 51 % (ref 41–53)
POC CREATININE: 0.8 MG/DL (ref 0.51–1.19)
POC HEMOGLOBIN (CALC): 17.3 G/DL (ref 13.5–17.5)
POTASSIUM BLD-SCNC: 4.9 MMOL/L (ref 3.5–4.5)
SODIUM BLD-SCNC: 137 MMOL/L (ref 138–146)

## 2023-11-14 PROCEDURE — 82947 ASSAY GLUCOSE BLOOD QUANT: CPT

## 2023-11-14 PROCEDURE — 2580000003 HC RX 258: Performed by: INTERNAL MEDICINE

## 2023-11-14 PROCEDURE — 93312 ECHO TRANSESOPHAGEAL: CPT | Performed by: INTERNAL MEDICINE

## 2023-11-14 PROCEDURE — 99152 MOD SED SAME PHYS/QHP 5/>YRS: CPT | Performed by: INTERNAL MEDICINE

## 2023-11-14 PROCEDURE — 93312 ECHO TRANSESOPHAGEAL: CPT

## 2023-11-14 PROCEDURE — 2060000000 HC ICU INTERMEDIATE R&B

## 2023-11-14 PROCEDURE — 94761 N-INVAS EAR/PLS OXIMETRY MLT: CPT

## 2023-11-14 PROCEDURE — 99221 1ST HOSP IP/OBS SF/LOW 40: CPT | Performed by: SPECIALIST

## 2023-11-14 PROCEDURE — 99222 1ST HOSP IP/OBS MODERATE 55: CPT | Performed by: INTERNAL MEDICINE

## 2023-11-14 PROCEDURE — 2580000003 HC RX 258: Performed by: STUDENT IN AN ORGANIZED HEALTH CARE EDUCATION/TRAINING PROGRAM

## 2023-11-14 PROCEDURE — 82435 ASSAY OF BLOOD CHLORIDE: CPT

## 2023-11-14 PROCEDURE — 84295 ASSAY OF SERUM SODIUM: CPT

## 2023-11-14 PROCEDURE — 84520 ASSAY OF UREA NITROGEN: CPT

## 2023-11-14 PROCEDURE — 93005 ELECTROCARDIOGRAM TRACING: CPT | Performed by: SPECIALIST

## 2023-11-14 PROCEDURE — 7100000010 HC PHASE II RECOVERY - FIRST 15 MIN: Performed by: INTERNAL MEDICINE

## 2023-11-14 PROCEDURE — 84132 ASSAY OF SERUM POTASSIUM: CPT

## 2023-11-14 PROCEDURE — 99221 1ST HOSP IP/OBS SF/LOW 40: CPT | Performed by: INTERNAL MEDICINE

## 2023-11-14 PROCEDURE — 6370000000 HC RX 637 (ALT 250 FOR IP): Performed by: INTERNAL MEDICINE

## 2023-11-14 PROCEDURE — 6370000000 HC RX 637 (ALT 250 FOR IP): Performed by: SPECIALIST

## 2023-11-14 PROCEDURE — 85014 HEMATOCRIT: CPT

## 2023-11-14 PROCEDURE — 82565 ASSAY OF CREATININE: CPT

## 2023-11-14 PROCEDURE — 7100000011 HC PHASE II RECOVERY - ADDTL 15 MIN: Performed by: INTERNAL MEDICINE

## 2023-11-14 PROCEDURE — 6360000002 HC RX W HCPCS: Performed by: INTERNAL MEDICINE

## 2023-11-14 PROCEDURE — 6370000000 HC RX 637 (ALT 250 FOR IP): Performed by: STUDENT IN AN ORGANIZED HEALTH CARE EDUCATION/TRAINING PROGRAM

## 2023-11-14 RX ORDER — POTASSIUM CHLORIDE 20 MEQ/1
40 TABLET, EXTENDED RELEASE ORAL PRN
Status: DISCONTINUED | OUTPATIENT
Start: 2023-11-14 | End: 2023-11-17 | Stop reason: HOSPADM

## 2023-11-14 RX ORDER — MAGNESIUM SULFATE IN WATER 40 MG/ML
2000 INJECTION, SOLUTION INTRAVENOUS PRN
Status: DISCONTINUED | OUTPATIENT
Start: 2023-11-14 | End: 2023-11-17 | Stop reason: HOSPADM

## 2023-11-14 RX ORDER — ACETAMINOPHEN 650 MG/1
650 SUPPOSITORY RECTAL EVERY 6 HOURS PRN
Status: DISCONTINUED | OUTPATIENT
Start: 2023-11-14 | End: 2023-11-17 | Stop reason: HOSPADM

## 2023-11-14 RX ORDER — SODIUM CHLORIDE 0.9 % (FLUSH) 0.9 %
10 SYRINGE (ML) INJECTION EVERY 12 HOURS SCHEDULED
Status: DISCONTINUED | OUTPATIENT
Start: 2023-11-14 | End: 2023-11-17 | Stop reason: HOSPADM

## 2023-11-14 RX ORDER — PROMETHAZINE HYDROCHLORIDE 25 MG/1
12.5 TABLET ORAL EVERY 6 HOURS PRN
Status: DISCONTINUED | OUTPATIENT
Start: 2023-11-14 | End: 2023-11-14

## 2023-11-14 RX ORDER — POLYETHYLENE GLYCOL 3350 17 G/17G
17 POWDER, FOR SOLUTION ORAL DAILY PRN
Status: DISCONTINUED | OUTPATIENT
Start: 2023-11-14 | End: 2023-11-17 | Stop reason: HOSPADM

## 2023-11-14 RX ORDER — ACETAMINOPHEN 325 MG/1
650 TABLET ORAL EVERY 6 HOURS PRN
Status: DISCONTINUED | OUTPATIENT
Start: 2023-11-14 | End: 2023-11-17 | Stop reason: HOSPADM

## 2023-11-14 RX ORDER — ONDANSETRON 2 MG/ML
4 INJECTION INTRAMUSCULAR; INTRAVENOUS EVERY 6 HOURS PRN
Status: DISCONTINUED | OUTPATIENT
Start: 2023-11-14 | End: 2023-11-14

## 2023-11-14 RX ORDER — DOFETILIDE 0.25 MG/1
250 CAPSULE ORAL EVERY 12 HOURS SCHEDULED
Status: DISCONTINUED | OUTPATIENT
Start: 2023-11-14 | End: 2023-11-15

## 2023-11-14 RX ORDER — MIDAZOLAM HYDROCHLORIDE 1 MG/ML
INJECTION INTRAMUSCULAR; INTRAVENOUS PRN
Status: COMPLETED | OUTPATIENT
Start: 2023-11-14 | End: 2023-11-14

## 2023-11-14 RX ORDER — LIDOCAINE HYDROCHLORIDE 20 MG/ML
SOLUTION OROPHARYNGEAL PRN
Status: COMPLETED | OUTPATIENT
Start: 2023-11-14 | End: 2023-11-14

## 2023-11-14 RX ORDER — SODIUM CHLORIDE 0.9 % (FLUSH) 0.9 %
10 SYRINGE (ML) INJECTION PRN
Status: DISCONTINUED | OUTPATIENT
Start: 2023-11-14 | End: 2023-11-17 | Stop reason: HOSPADM

## 2023-11-14 RX ORDER — SODIUM CHLORIDE 9 MG/ML
INJECTION, SOLUTION INTRAVENOUS PRN
Status: DISCONTINUED | OUTPATIENT
Start: 2023-11-14 | End: 2023-11-17 | Stop reason: HOSPADM

## 2023-11-14 RX ORDER — SODIUM CHLORIDE 9 MG/ML
INJECTION, SOLUTION INTRAVENOUS CONTINUOUS
Status: DISCONTINUED | OUTPATIENT
Start: 2023-11-14 | End: 2023-11-17 | Stop reason: HOSPADM

## 2023-11-14 RX ORDER — POTASSIUM CHLORIDE 7.45 MG/ML
10 INJECTION INTRAVENOUS PRN
Status: DISCONTINUED | OUTPATIENT
Start: 2023-11-14 | End: 2023-11-17 | Stop reason: HOSPADM

## 2023-11-14 RX ADMIN — MIDAZOLAM 1 MG: 1 INJECTION INTRAMUSCULAR; INTRAVENOUS at 09:54

## 2023-11-14 RX ADMIN — DOFETILIDE 250 MCG: 0.25 CAPSULE ORAL at 13:31

## 2023-11-14 RX ADMIN — APIXABAN 5 MG: 5 TABLET, FILM COATED ORAL at 20:12

## 2023-11-14 RX ADMIN — FENTANYL CITRATE 25 MCG: 50 INJECTION INTRAMUSCULAR; INTRAVENOUS at 09:51

## 2023-11-14 RX ADMIN — FENTANYL CITRATE 25 MCG: 50 INJECTION INTRAMUSCULAR; INTRAVENOUS at 09:59

## 2023-11-14 RX ADMIN — SODIUM CHLORIDE: 9 INJECTION, SOLUTION INTRAVENOUS at 08:42

## 2023-11-14 RX ADMIN — METOPROLOL TARTRATE 25 MG: 25 TABLET ORAL at 20:12

## 2023-11-14 RX ADMIN — MIDAZOLAM 1 MG: 1 INJECTION INTRAMUSCULAR; INTRAVENOUS at 09:59

## 2023-11-14 RX ADMIN — MIDAZOLAM 2 MG: 1 INJECTION INTRAMUSCULAR; INTRAVENOUS at 09:51

## 2023-11-14 RX ADMIN — SODIUM CHLORIDE, PRESERVATIVE FREE 10 ML: 5 INJECTION INTRAVENOUS at 20:13

## 2023-11-14 RX ADMIN — LIDOCAINE HYDROCHLORIDE 15 ML: 20 SOLUTION ORAL; TOPICAL at 09:33

## 2023-11-14 RX ADMIN — BENZOCAINE, BUTAMBEN, AND TETRACAINE HYDROCHLORIDE 3 SPRAY: .028; .004; .004 AEROSOL, SPRAY TOPICAL at 09:35

## 2023-11-14 NOTE — H&P
[facility] Cardiology   History & Physical               Today's Date: 11/14/2023  Patient Name: Renée Escalante  Date of admission: 11/14/2023  7:38 AM  Patient's age: 66 y.o., 1960  Admission Dx: Thrombus [I82.90]    Reason for Admission:  DIONNE    CHIEF COMPLAINT:  Evaluate for left atrial appendage    History Obtained From:  patient, electronic medical record    HISTORY OF PRESENT ILLNESS:      The patient is a 66 y.o.  male with past medical history of atrial flutter and documented left atrial appendage blood clot presented to hospital for scheduled DIONNE to reevaluate for BRIDGER blood clot. DIONNE was performed in the Cath Lab which did not show BRIDGER blood clot that was seen on previous DIONNE. Discussed the patient with Dr. Ronit Davila, electrophysiology, and recommended patient be admitted for antiarrhythmic therapy. Past Medical History:   has a past medical history of Atrial flutter ([address]), Bunion, Diverticul disease small and large intestine, no perforati or abscess, GERD (gastroesophageal reflux disease), Hammer toe, Heart attack ([address]), Hyperlipidemia, Hypertension, Pericarditis, Positive cardiac stress test, and Smoking history. Past Surgical History:   has a past surgical history that includes Upper gastrointestinal endoscopy; Knee arthroscopy (7-2006, 1-2014); Foot surgery; Colonoscopy (01/08/2018); Total knee arthroplasty (Left, 02/26/2019); Cardiac catheterization (02/25/2022); Coronary angioplasty; joint replacement (Left); Endoscopy, colon, diagnostic; Total knee arthroplasty (Right, 03/08/2022); joint replacement (Right, 03/2022); transesophageal echocardiogram (N/A, 09/19/2023); Cardioversion (N/A, 09/19/2023); ep study/ablation; and transesophageal echocardiogram (11/14/2023). Home Medications:    Prior to Admission medications    Medication Sig Start Date End Date Taking?  Authorizing Provider   atorvastatin (LIPITOR) 40 MG tablet Take 1 tablet by mouth nightly 11/1/23   Leo [place] signed by      Attending Physician Statement:    I have discussed the care of  Renée Escalante , including pertinent history and exam findings, with the Cardiology fellow/resident. I have seen and examined the patient and the key elements of all parts of the encounter have been performed by me. I agree with the assessment, plan and orders as documented by the fellow/resident, after I modified exam findings and plan of treatments, and the final version is my approved version of the assessment. Additional Comments: Proceed with DIONNE. Afterward, patient will be admitted per Dr. Yonny Carlson request for medications.

## 2023-11-14 NOTE — CONSULTS
Mr. Adonis Duverney is very well-known to me from previous encounters in the past.  When I saw him that he was diagnosed by then to have atrial fibrillation/flutter with the left atrial appendage clot. He was a started on oral anticoagulants for more than 6 weeks. He was brought in today DIONNE ruled out clot in the appendage anymore. The plan from the beginning was to do the DIONNE and if cleared to proceed with starting him on Tikosyn and hoping it will convert him to sinus rhythm if not then DC cardioversion can be done after 5 doses of Tikosyn. Above was discussed again today with the patient and his wife. The proarrhythmic effect of Tikosyn was discussed again with the patient and his wife. The plan is to cardiovert and arrange for her pulmonary vein isolation as an outpatient. The orders for Tikosyn was placed and. We will start with 0.25 mg p.o. twice daily and may need to adjust it in the course of the treatment. EKG orders were placed and. Magnesium and electrolytes orders to be checked every day were done. Past medical history is significant for the above added to that is coronary artery disease hyperlipidemia hypertension. History of pericarditis. Reported positive cardiac stress test.  Past surgical history significant for arthritis and arthroscopies colonoscopies. .    Medications    Home Medications[]Expand by Default           Prior to Admission medications    Medication Sig Start Date End Date Taking?  Authorizing Provider   atorvastatin (LIPITOR) 40 MG tablet Take 1 tablet by mouth nightly 11/1/23     PAULA Cummings CNP   meloxicam [facility]) 15 MG tablet Take 1 tablet by mouth daily as needed for Pain 9/27/23     Edmundo Frankel, APRN - CNP   lisinopril (PRINIVIL;ZESTRIL) 20 MG tablet Take 1 tablet by mouth once daily 9/25/23     Edmundo Frankel, APRN - CNP   tamsulosin [facility]) 0.4 MG capsule Take 1 capsule by mouth once daily 9/25/23     Edmundo Frankel, APRN - CNP

## 2023-11-14 NOTE — BRIEF OP NOTE
[facility]  Transesophageal Echocardiogram       Today's Date:  11/14/2023  Ordering Physician:  Dr. Joesph Blair  Indication:   Previous BRIDGER clot, aflutter    Operators:  Primary:   TL Kramer MD (Attending Physician)  Assistant:   Rony Kruse MD (Cardiovascular Fellow)    Pre Procedure Conscious Sedation Data:  ASA Class:    [] I [x] II [] III [] IV    Mallampati Class:  [] I [x] II [] III [] IV    Procedure:  Patient seen and examined. History and Physical reviewed. Labs reviewed. After informed consent was obtained with explanation of the risks and benefits, the patient was brought to cardiac cath lab. All sedation was administered by the cardiologist. The oropharynx was pre-anesthesized with viscous lidocaine and cetacaine spray. The ultrasound probe was passed without any difficulty. Summary:   1. A DIONNE was performed without complications. 2. BRIDGER clot that was seen on previous DIONNE is not present anymore  3. Discussed with Dr. Joesph Blair (EP) and would like patient be admitted for anti-arrhythmic therapy.        Rony Kruse MD  Fellow, cardiovascular disease

## 2023-11-14 NOTE — PROGRESS NOTES
TRANSFER - OUT REPORT:    Verbal report given to [email or web address] on [employer] being transferred to [facility](unit) for routine progression of patient care       Report consisted of patient's Situation, Background, Assessment and   Recommendations(SBAR). Information from the following report(s) Nurse Handoff Report was reviewed with the receiving nurse. Opportunity for questions and clarification was provided.       Patient transported with:   Registered Nurse

## 2023-11-14 NOTE — PLAN OF CARE
Problem: Safety - Adult  Goal: Free from fall injury  Outcome: Progressing     Problem: Discharge Planning  Goal: Discharge to home or other facility with appropriate resources  Outcome: Progressing  Flowsheets  Taken 11/14/2023 1657  Discharge to home or other facility with appropriate resources: Identify barriers to discharge with patient and caregiver  Taken 11/14/2023 1614  Discharge to home or other facility with appropriate resources: Identify barriers to discharge with patient and caregiver     Problem: ABCDS Injury Assessment  Goal: Absence of physical injury  Outcome: Progressing

## 2023-11-14 NOTE — PROGRESS NOTES
Received post procedure to [facility] to room 4. Assessment obtained. Restrictions reviewed with patient. Post procedure pathway initiated. Family at side. Patient without complaints.

## 2023-11-15 LAB
ANION GAP SERPL CALCULATED.3IONS-SCNC: 8 MMOL/L (ref 9–17)
BASOPHILS # BLD: 0.05 K/UL (ref 0–0.2)
BASOPHILS NFR BLD: 0 % (ref 0–2)
BUN SERPL-MCNC: 12 MG/DL (ref 8–23)
CALCIUM SERPL-MCNC: 8.7 MG/DL (ref 8.6–10.4)
CHLORIDE SERPL-SCNC: 101 MMOL/L (ref 98–107)
CO2 SERPL-SCNC: 25 MMOL/L (ref 20–31)
CREAT SERPL-MCNC: 0.7 MG/DL (ref 0.7–1.2)
EKG ATRIAL RATE: 288 BPM
EKG ATRIAL RATE: 300 BPM
EKG P AXIS: -55 DEGREES
EKG Q-T INTERVAL: 440 MS
EKG Q-T INTERVAL: 458 MS
EKG QRS DURATION: 80 MS
EKG QRS DURATION: 82 MS
EKG QTC CALCULATION (BAZETT): 389 MS
EKG QTC CALCULATION (BAZETT): 409 MS
EKG R AXIS: 132 DEGREES
EKG R AXIS: 36 DEGREES
EKG T AXIS: 40 DEGREES
EKG T AXIS: 48 DEGREES
EKG VENTRICULAR RATE: 47 BPM
EKG VENTRICULAR RATE: 48 BPM
EOSINOPHIL # BLD: 0.17 K/UL (ref 0–0.44)
EOSINOPHILS RELATIVE PERCENT: 1 % (ref 1–4)
ERYTHROCYTE [DISTWIDTH] IN BLOOD BY AUTOMATED COUNT: 18.4 % (ref 11.8–14.4)
GFR SERPL CREATININE-BSD FRML MDRD: >60 ML/MIN/1.73M2
GLUCOSE SERPL-MCNC: 105 MG/DL (ref 70–99)
HCT VFR BLD AUTO: 45.9 % (ref 40.7–50.3)
HGB BLD-MCNC: 14.9 G/DL (ref 13–17)
IMM GRANULOCYTES # BLD AUTO: 0.04 K/UL (ref 0–0.3)
IMM GRANULOCYTES NFR BLD: 0 %
LYMPHOCYTES NFR BLD: 2.31 K/UL (ref 1.1–3.7)
LYMPHOCYTES RELATIVE PERCENT: 18 % (ref 24–43)
MAGNESIUM SERPL-MCNC: 1.8 MG/DL (ref 1.6–2.6)
MCH RBC QN AUTO: 27.9 PG (ref 25.2–33.5)
MCHC RBC AUTO-ENTMCNC: 32.5 G/DL (ref 28.4–34.8)
MCV RBC AUTO: 86 FL (ref 82.6–102.9)
MONOCYTES NFR BLD: 1.19 K/UL (ref 0.1–1.2)
MONOCYTES NFR BLD: 9 % (ref 3–12)
NEUTROPHILS NFR BLD: 72 % (ref 36–65)
NEUTS SEG NFR BLD: 9.38 K/UL (ref 1.5–8.1)
NRBC BLD-RTO: 0 PER 100 WBC
PLATELET # BLD AUTO: 225 K/UL (ref 138–453)
PMV BLD AUTO: 10.1 FL (ref 8.1–13.5)
POTASSIUM SERPL-SCNC: 5 MMOL/L (ref 3.7–5.3)
RBC # BLD AUTO: 5.34 M/UL (ref 4.21–5.77)
RBC # BLD: ABNORMAL 10*6/UL
SODIUM SERPL-SCNC: 134 MMOL/L (ref 135–144)
WBC OTHER # BLD: 13.1 K/UL (ref 3.5–11.3)

## 2023-11-15 PROCEDURE — 6370000000 HC RX 637 (ALT 250 FOR IP): Performed by: STUDENT IN AN ORGANIZED HEALTH CARE EDUCATION/TRAINING PROGRAM

## 2023-11-15 PROCEDURE — 93010 ELECTROCARDIOGRAM REPORT: CPT | Performed by: INTERNAL MEDICINE

## 2023-11-15 PROCEDURE — 97161 PT EVAL LOW COMPLEX 20 MIN: CPT

## 2023-11-15 PROCEDURE — 36415 COLL VENOUS BLD VENIPUNCTURE: CPT

## 2023-11-15 PROCEDURE — 83735 ASSAY OF MAGNESIUM: CPT

## 2023-11-15 PROCEDURE — 6370000000 HC RX 637 (ALT 250 FOR IP): Performed by: SPECIALIST

## 2023-11-15 PROCEDURE — 6370000000 HC RX 637 (ALT 250 FOR IP)

## 2023-11-15 PROCEDURE — 97530 THERAPEUTIC ACTIVITIES: CPT

## 2023-11-15 PROCEDURE — 80048 BASIC METABOLIC PNL TOTAL CA: CPT

## 2023-11-15 PROCEDURE — 93005 ELECTROCARDIOGRAM TRACING: CPT | Performed by: SPECIALIST

## 2023-11-15 PROCEDURE — 99231 SBSQ HOSP IP/OBS SF/LOW 25: CPT | Performed by: SPECIALIST

## 2023-11-15 PROCEDURE — 2060000000 HC ICU INTERMEDIATE R&B

## 2023-11-15 PROCEDURE — 99233 SBSQ HOSP IP/OBS HIGH 50: CPT

## 2023-11-15 PROCEDURE — 2580000003 HC RX 258: Performed by: STUDENT IN AN ORGANIZED HEALTH CARE EDUCATION/TRAINING PROGRAM

## 2023-11-15 PROCEDURE — 85025 COMPLETE CBC W/AUTO DIFF WBC: CPT

## 2023-11-15 RX ORDER — LISINOPRIL 20 MG/1
20 TABLET ORAL DAILY
Status: DISCONTINUED | OUTPATIENT
Start: 2023-11-15 | End: 2023-11-17 | Stop reason: HOSPADM

## 2023-11-15 RX ORDER — PANTOPRAZOLE SODIUM 40 MG/1
40 TABLET, DELAYED RELEASE ORAL
Status: DISCONTINUED | OUTPATIENT
Start: 2023-11-16 | End: 2023-11-17 | Stop reason: HOSPADM

## 2023-11-15 RX ORDER — DOFETILIDE 0.25 MG/1
250 CAPSULE ORAL EVERY 12 HOURS SCHEDULED
Status: DISCONTINUED | OUTPATIENT
Start: 2023-11-15 | End: 2023-11-17 | Stop reason: HOSPADM

## 2023-11-15 RX ORDER — TAMSULOSIN HYDROCHLORIDE 0.4 MG/1
0.4 CAPSULE ORAL DAILY
Status: DISCONTINUED | OUTPATIENT
Start: 2023-11-15 | End: 2023-11-17 | Stop reason: HOSPADM

## 2023-11-15 RX ORDER — ATORVASTATIN CALCIUM 40 MG/1
40 TABLET, FILM COATED ORAL NIGHTLY
Status: DISCONTINUED | OUTPATIENT
Start: 2023-11-15 | End: 2023-11-17 | Stop reason: HOSPADM

## 2023-11-15 RX ORDER — CALCIUM CARBONATE 500 MG/1
500 TABLET, CHEWABLE ORAL 3 TIMES DAILY PRN
Status: DISCONTINUED | OUTPATIENT
Start: 2023-11-15 | End: 2023-11-17 | Stop reason: HOSPADM

## 2023-11-15 RX ADMIN — SODIUM CHLORIDE, PRESERVATIVE FREE 10 ML: 5 INJECTION INTRAVENOUS at 10:11

## 2023-11-15 RX ADMIN — ANTACID TABLETS 500 MG: 500 TABLET, CHEWABLE ORAL at 18:38

## 2023-11-15 RX ADMIN — SODIUM CHLORIDE, PRESERVATIVE FREE 10 ML: 5 INJECTION INTRAVENOUS at 19:47

## 2023-11-15 RX ADMIN — DOFETILIDE 250 MCG: 0.25 CAPSULE ORAL at 02:23

## 2023-11-15 RX ADMIN — LISINOPRIL 20 MG: 20 TABLET ORAL at 14:59

## 2023-11-15 RX ADMIN — TAMSULOSIN HYDROCHLORIDE 0.4 MG: 0.4 CAPSULE ORAL at 14:59

## 2023-11-15 RX ADMIN — APIXABAN 5 MG: 5 TABLET, FILM COATED ORAL at 19:46

## 2023-11-15 RX ADMIN — DOFETILIDE 250 MCG: 0.25 CAPSULE ORAL at 14:34

## 2023-11-15 RX ADMIN — APIXABAN 5 MG: 5 TABLET, FILM COATED ORAL at 10:11

## 2023-11-15 RX ADMIN — ANTACID TABLETS 500 MG: 500 TABLET, CHEWABLE ORAL at 10:52

## 2023-11-15 RX ADMIN — DESMOPRESSIN ACETATE 40 MG: 0.2 TABLET ORAL at 19:46

## 2023-11-15 NOTE — PLAN OF CARE
Problem: Safety - Adult  Goal: Free from fall injury  11/15/2023 0511 by Nuvia Soria RN  Outcome: Progressing  11/14/2023 1753 by Laith Kerr RN  Outcome: Progressing     Problem: Discharge Planning  Goal: Discharge to home or other facility with appropriate resources  11/15/2023 0511 by Nuvia Soria RN  Outcome: Progressing  11/14/2023 1753 by Laith Kerr RN  Outcome: Progressing  Flowsheets  Taken 11/14/2023 1657  Discharge to home or other facility with appropriate resources: Identify barriers to discharge with patient and caregiver  Taken 11/14/2023 1614  Discharge to home or other facility with appropriate resources: Identify barriers to discharge with patient and caregiver     Problem: ABCDS Injury Assessment  Goal: Absence of physical injury  11/15/2023 0511 by Nuvia Soria RN  Outcome: Progressing  11/14/2023 1753 by Laith Kerr RN  Outcome: Progressing

## 2023-11-15 NOTE — PROGRESS NOTES
Occupational [address]  Occupational Therapy Not Seen Note    DATE: 11/15/2023    NAME: Renée Escalante  MRN: [id number]   : 1960      Patient not seen this date for Occupational Therapy due to:    Patient independent with ADLs and functional tasks with no acute OT needs. Will defer OT evaluation at this time. Please reorder OT if future needs arise.      Electronically signed by REKHA Forrest on 11/15/2023 at 3:10 PM

## 2023-11-15 NOTE — PROGRESS NOTES
Apparently Tikosyn  0.25 mgm BID, has not been given to pt as ordered, and ECGs are not done as ordered. Pt remains in a fib with slow v response. Beta blockers was ordered to be reduced to 25 mgm po daily. Will need to get him 5 doses before we can consider dc cv  Above has been in discussion with Pt and wife. Exam : not changed  ECG : a fib, slow v response , QTc 389 mSec  Will continue the same.

## 2023-11-15 NOTE — CARE COORDINATION
Case Management Assessment  Initial Evaluation    Date/Time of Evaluation: 11/15/2023 12:55 PM  Assessment Completed by: Mt Aguila RN    If patient is discharged prior to next notation, then this note serves as note for discharge by case management. Patient Name: Iram Best                   YOB: 1960  Diagnosis: Encounter for monitoring anti-arrhythmic therapy [Z51.81, Z79.899]                   Date / Time: 11/14/2023  4:12 PM    Patient Admission Status: Inpatient   Readmission Risk (Low < 19, Mod (19-27), High > 27): Readmission Risk Score: 6.9    Current PCP: PAULA Wayne CNP  PCP verified by CM? (P) Yes HANNAH Wayne)    Chart Reviewed: Yes      History Provided by: (P) Patient  Patient Orientation: (P) Alert and Oriented, Person, Place, Situation, Self    Patient Cognition: (P) Alert    Hospitalization in the last 30 days (Readmission):  No    If yes, Readmission Assessment in CM Navigator will be completed.     Advance Directives:      Code Status: Full Code   Patient's Primary Decision Maker is: (P) Patient Declined (Legal Next of Kin Remains as Decision Maker)      Discharge Planning:    Patient lives with: (P) Spouse/Significant Other Type of Home: (P) House  Primary Care Giver: (P) Self  Patient Support Systems include: (P) Spouse/Significant Other, Children, Family Members, Friends/Neighbors   Current Financial resources: (P) Other (Comment) ([employer])  Current community resources: (P) None  Current services prior to admission: (P) None            Current DME:  none            Type of Home Care services:  (P) None    ADLS  Prior functional level: (P) Independent in ADLs/IADLs  Current functional level: (P) Independent in ADLs/IADLs    PT AM-PAC: 24 /24  OT AM-PAC:   /24    Family can provide assistance at DC: (P) Yes  Would you like Case Management to discuss the discharge plan with any other family members/significant others, and if so,

## 2023-11-15 NOTE — PLAN OF CARE
Problem: Safety - Adult  Goal: Free from fall injury  11/15/2023 1831 by Raul Fischer RN  Flowsheets (Taken 11/15/2023 1831)  Free From Fall Injury:   Instruct family/caregiver on patient safety   Based on caregiver fall risk screen, instruct family/caregiver to ask for assistance with transferring infant if caregiver noted to have fall risk factors  11/15/2023 1831 by Raul Fischer RN  Outcome: Progressing  11/15/2023 1831 by Raul Fischer RN  Outcome: Progressing  [address] (Taken 11/15/2023 1831)  Free From Fall Injury:   Instruct family/caregiver on patient safety   Based on caregiver fall risk screen, instruct family/caregiver to ask for assistance with transferring infant if caregiver noted to have fall risk factors  11/15/2023 0511 by Shana Parker RN  Outcome: Progressing     Problem: Discharge Planning  Goal: Discharge to home or other facility with appropriate resources  11/15/2023 1831 by Raul Fischer RN  Flowsheets (Taken 11/14/2023 1657 by Bianca Iqbal RN)  Discharge to home or other facility with appropriate resources: Identify barriers to discharge with patient and caregiver  11/15/2023 1831 by Raul Fischer RN  Outcome: Progressing  11/15/2023 1831 by Raul Fischer RN  Outcome: Progressing  11/15/2023 0511 by Shana Parker RN  Outcome: Progressing     Problem: ABCDS Injury Assessment  Goal: Absence of physical injury  11/15/2023 1831 by Raul Fischer RN  Flowsheets (Taken 11/15/2023 1831)  Absence of Physical Injury: Implement safety measures based on patient assessment  11/15/2023 1831 by Raul Fischer RN  Outcome: Progressing  11/15/2023 1831 by Raul Fischer RN  Outcome: Progressing  Flowsheets (Taken 11/15/2023 1831)  Absence of Physical Injury: Implement safety measures based on patient assessment  11/15/2023 0511 by Shana Parker RN  Outcome: Progressing

## 2023-11-15 NOTE — PROGRESS NOTES
RN contacted pharmacy regarding due times for tikosyn medication, as last dose was given around 0230 today. As per orders, EKG will be obtained 2 hours prior to due time of medication. Next dose of tikosyn will be rescheduled for 1430 today with EKG due time of 1230. Cardiology informed.

## 2023-11-15 NOTE — PROGRESS NOTES
Physical Therapy  [facility]  [facility] Therapy Initial Assessment    Name: Calvin Jiménez  : 1960  MRN: [id number]  Date of Service: 11/15/2023  No chief complaint on file. Discharge Recommendations:    No further therapy required at discharge. PT Equipment Recommendations  Equipment Needed: No      Patient Diagnosis(es): The primary encounter diagnosis was Thrombus of left atrial appendage. A diagnosis of Thrombus was also pertinent to this visit. Past Medical History:  has a past medical history of Atrial flutter ([address]), Bunion, Diverticul disease small and large intestine, no perforati or abscess, GERD (gastroesophageal reflux disease), Hammer toe, Heart attack ([address]), Hyperlipidemia, Hypertension, Pericarditis, Positive cardiac stress test, and Smoking history. Past Surgical History:  has a past surgical history that includes Upper gastrointestinal endoscopy; Knee arthroscopy (, -); Foot surgery; Colonoscopy (2018); Total knee arthroplasty (Left, 2019); Cardiac catheterization (2022); Coronary angioplasty; joint replacement (Left); Endoscopy, colon, diagnostic; Total knee arthroplasty (Right, 2022); joint replacement (Right, 2022); transesophageal echocardiogram (N/A, 2023); Cardioversion (N/A, 2023); ep study/ablation; and transesophageal echocardiogram (2023). Assessment   Assessment: Pt amb 500' IND no AD. Pt demonstrates functional mobility and safety throughout eval. Pt denies barriers to safe return home. PT to Sign off.   Therapy Prognosis: Excellent  Decision Making: Low Complexity  Requires PT Follow-Up: No  Activity Tolerance  Activity Tolerance: Patient tolerated treatment well     Plan   Physical Therapy Plan  General Plan: Discharge with evaluation only  Safety Devices  Type of Devices: Call light within reach, Left in bed, Gait belt, Nurse notified, All fall risk precautions in

## 2023-11-16 ENCOUNTER — TELEPHONE (OUTPATIENT)
Dept: VASCULAR SURGERY | Age: 63
End: 2023-11-16

## 2023-11-16 LAB
ANION GAP SERPL CALCULATED.3IONS-SCNC: 10 MMOL/L (ref 9–17)
BUN SERPL-MCNC: 10 MG/DL (ref 8–23)
CALCIUM SERPL-MCNC: 9 MG/DL (ref 8.6–10.4)
CHLORIDE SERPL-SCNC: 101 MMOL/L (ref 98–107)
CO2 SERPL-SCNC: 20 MMOL/L (ref 20–31)
CREAT SERPL-MCNC: 0.7 MG/DL (ref 0.7–1.2)
EKG ATRIAL RATE: 300 BPM
EKG ATRIAL RATE: 306 BPM
EKG P AXIS: -69 DEGREES
EKG Q-T INTERVAL: 452 MS
EKG Q-T INTERVAL: 464 MS
EKG QRS DURATION: 76 MS
EKG QRS DURATION: 84 MS
EKG QTC CALCULATION (BAZETT): 406 MS
EKG QTC CALCULATION (BAZETT): 439 MS
EKG R AXIS: 24 DEGREES
EKG R AXIS: 35 DEGREES
EKG T AXIS: 30 DEGREES
EKG T AXIS: 34 DEGREES
EKG VENTRICULAR RATE: 46 BPM
EKG VENTRICULAR RATE: 57 BPM
GFR SERPL CREATININE-BSD FRML MDRD: >60 ML/MIN/1.73M2
GLUCOSE SERPL-MCNC: 98 MG/DL (ref 70–99)
MAGNESIUM SERPL-MCNC: 2.4 MG/DL (ref 1.6–2.6)
POTASSIUM SERPL-SCNC: 4.6 MMOL/L (ref 3.7–5.3)
SODIUM SERPL-SCNC: 131 MMOL/L (ref 135–144)

## 2023-11-16 PROCEDURE — 6370000000 HC RX 637 (ALT 250 FOR IP): Performed by: SPECIALIST

## 2023-11-16 PROCEDURE — 99231 SBSQ HOSP IP/OBS SF/LOW 25: CPT | Performed by: SPECIALIST

## 2023-11-16 PROCEDURE — 80048 BASIC METABOLIC PNL TOTAL CA: CPT

## 2023-11-16 PROCEDURE — 2580000003 HC RX 258: Performed by: STUDENT IN AN ORGANIZED HEALTH CARE EDUCATION/TRAINING PROGRAM

## 2023-11-16 PROCEDURE — 36415 COLL VENOUS BLD VENIPUNCTURE: CPT

## 2023-11-16 PROCEDURE — 99233 SBSQ HOSP IP/OBS HIGH 50: CPT

## 2023-11-16 PROCEDURE — 6370000000 HC RX 637 (ALT 250 FOR IP)

## 2023-11-16 PROCEDURE — 93005 ELECTROCARDIOGRAM TRACING: CPT | Performed by: SPECIALIST

## 2023-11-16 PROCEDURE — 93010 ELECTROCARDIOGRAM REPORT: CPT | Performed by: INTERNAL MEDICINE

## 2023-11-16 PROCEDURE — 83735 ASSAY OF MAGNESIUM: CPT

## 2023-11-16 PROCEDURE — 6370000000 HC RX 637 (ALT 250 FOR IP): Performed by: STUDENT IN AN ORGANIZED HEALTH CARE EDUCATION/TRAINING PROGRAM

## 2023-11-16 PROCEDURE — 2060000000 HC ICU INTERMEDIATE R&B

## 2023-11-16 RX ORDER — SODIUM CHLORIDE 0.9 % (FLUSH) 0.9 %
5-40 SYRINGE (ML) INJECTION EVERY 12 HOURS SCHEDULED
Status: DISCONTINUED | OUTPATIENT
Start: 2023-11-16 | End: 2023-11-17 | Stop reason: HOSPADM

## 2023-11-16 RX ORDER — SODIUM CHLORIDE 0.9 % (FLUSH) 0.9 %
5-40 SYRINGE (ML) INJECTION PRN
Status: DISCONTINUED | OUTPATIENT
Start: 2023-11-16 | End: 2023-11-17 | Stop reason: HOSPADM

## 2023-11-16 RX ORDER — SODIUM CHLORIDE 9 MG/ML
INJECTION, SOLUTION INTRAVENOUS PRN
Status: DISCONTINUED | OUTPATIENT
Start: 2023-11-16 | End: 2023-11-17 | Stop reason: HOSPADM

## 2023-11-16 RX ADMIN — DOFETILIDE 250 MCG: 0.25 CAPSULE ORAL at 14:22

## 2023-11-16 RX ADMIN — SODIUM CHLORIDE, PRESERVATIVE FREE 10 ML: 5 INJECTION INTRAVENOUS at 08:36

## 2023-11-16 RX ADMIN — TAMSULOSIN HYDROCHLORIDE 0.4 MG: 0.4 CAPSULE ORAL at 08:36

## 2023-11-16 RX ADMIN — SODIUM CHLORIDE, PRESERVATIVE FREE 10 ML: 5 INJECTION INTRAVENOUS at 20:49

## 2023-11-16 RX ADMIN — APIXABAN 5 MG: 5 TABLET, FILM COATED ORAL at 20:48

## 2023-11-16 RX ADMIN — LISINOPRIL 20 MG: 20 TABLET ORAL at 08:36

## 2023-11-16 RX ADMIN — DOFETILIDE 250 MCG: 0.25 CAPSULE ORAL at 02:24

## 2023-11-16 RX ADMIN — DESMOPRESSIN ACETATE 40 MG: 0.2 TABLET ORAL at 20:48

## 2023-11-16 RX ADMIN — APIXABAN 5 MG: 5 TABLET, FILM COATED ORAL at 08:36

## 2023-11-16 RX ADMIN — METOPROLOL TARTRATE 25 MG: 25 TABLET ORAL at 08:36

## 2023-11-16 RX ADMIN — PANTOPRAZOLE SODIUM 40 MG: 40 TABLET, DELAYED RELEASE ORAL at 06:35

## 2023-11-16 NOTE — PROGRESS NOTES
Patient heart rate bouncing between 40s and 70s. Lopressor given this morning. Cardiology and Electrophysiology notified.

## 2023-11-16 NOTE — PLAN OF CARE
Problem: Safety - Adult  Goal: Free from fall injury  11/16/2023 0512 by Lindsey Johnston RN  Outcome: Progressing  Flowsheets (Taken 11/15/2023 1834 by Debora Parra RN)  Free From Fall Injury: Instruct family/caregiver on patient safety  11/15/2023 1831 by Debora Parra RN  Flowsheets (Taken 11/15/2023 1831)  Free From Fall Injury:   Instruct family/caregiver on patient safety   Based on caregiver fall risk screen, instruct family/caregiver to ask for assistance with transferring infant if caregiver noted to have fall risk factors  11/15/2023 1831 by Debora Parra RN  Outcome: Progressing  11/15/2023 1831 by Debora Parra RN  Outcome: Progressing  [address] (Taken 11/15/2023 1831)  Free From Fall Injury:   Instruct family/caregiver on patient safety   Based on caregiver fall risk screen, instruct family/caregiver to ask for assistance with transferring infant if caregiver noted to have fall risk factors     Problem: Discharge Planning  Goal: Discharge to home or other facility with appropriate resources  11/16/2023 0512 by Lindsey Johnston RN  Outcome: Progressing  11/15/2023 1831 by Debora Parra RN  Flowsheets (Taken 11/14/2023 1657 by Chelly Torres RN)  Discharge to home or other facility with appropriate resources: Identify barriers to discharge with patient and caregiver  11/15/2023 1831 by Debora Parra RN  Outcome: Progressing  11/15/2023 1831 by Debora Parra RN  Outcome: Progressing     Problem: ABCDS Injury Assessment  Goal: Absence of physical injury  11/16/2023 0512 by Lindsey Johnston RN  Outcome: Progressing  [address] (Taken 11/15/2023 1834 by Debora Parra RN)  Absence of Physical Injury: Implement safety measures based on patient assessment  11/15/2023 1831 by Debora Parra RN  Flowsheets (Taken 11/15/2023 1831)  Absence of Physical Injury: Implement safety measures based on patient assessment  11/15/2023 1831 by Debora Parra RN  Outcome:

## 2023-11-16 NOTE — PLAN OF CARE
Problem: Safety - Adult  Goal: Free from fall injury  11/16/2023 1409 by Wesley Celestin RN  Outcome: Progressing  Flowsheets (Taken 11/16/2023 1408)  Free From Fall Injury: Instruct family/caregiver on patient safety  11/16/2023 0512 by Bruce Phelan RN  Outcome: Progressing  Flowsheets (Taken 11/15/2023 1834 by Wesley Celestin RN)  Free From Fall Injury: Instruct family/caregiver on patient safety     Problem: Discharge Planning  Goal: Discharge to home or other facility with appropriate resources  11/16/2023 1409 by Wesley Celestin RN  Outcome: Progressing  Flowsheets (Taken 11/14/2023 1657 by Carolina Lopez RN)  Discharge to home or other facility with appropriate resources: Identify barriers to discharge with patient and caregiver  11/16/2023 0512 by Bruce Phelan RN  Outcome: Progressing     Problem: ABCDS Injury Assessment  Goal: Absence of physical injury  11/16/2023 1409 by Wesley Celestin RN  Outcome: Progressing  Flowsheets (Taken 11/16/2023 1408)  Absence of Physical Injury: Implement safety measures based on patient assessment  11/16/2023 0512 by Bruce Phelan RN  Outcome: Progressing  Flowsheets (Taken 11/15/2023 1834 by Wesley Celestin RN)  Absence of Physical Injury: Implement safety measures based on patient assessment

## 2023-11-16 NOTE — PROGRESS NOTES
Remains in atrial fibrillation with moderate to slow ventricular response despite Tikosyn 0.25 mg p.o. twice daily for more than 4 doses now. As has been the plans he is going to have DC cardioversion tomorrow around noon and regardless if he converts and stays in sinus or he remains in atrial fibrillation, the patient will be discharged home after that tomorrow afternoon. Patient will be n.p.o. after 10:00 in the morning except for medication. All of the above has been in discussion repeatedly with the patient and his family.

## 2023-11-16 NOTE — TELEPHONE ENCOUNTER
----- Message from Roman Pina, [address] [facility] sent at 11/16/2023  2:32 PM EST -----  Scheduled with Margarita Neves for Friday 11/16/23 at noon  ----- Message -----  From: Jessica Aaron MA  Sent: 11/16/2023   2:07 PM EST  To: Roman Pina MA      ----- Message -----  From: Kirit Castaneda MD  Sent: 11/16/2023  10:51 AM EST  To: Jessica Aaron MA    Could you please schedule him for dc cv tomorrow around 12:30 pm

## 2023-11-17 ENCOUNTER — OFFICE VISIT (OUTPATIENT)
Dept: OPERATING ROOM | Age: 63
End: 2023-11-17
Attending: SPECIALIST

## 2023-11-17 ENCOUNTER — HOSPITAL ENCOUNTER (INPATIENT)
Age: 63
End: 2023-11-17
Attending: SPECIALIST
Payer: COMMERCIAL

## 2023-11-17 VITALS
DIASTOLIC BLOOD PRESSURE: 79 MMHG | BODY MASS INDEX: 36.65 KG/M2 | WEIGHT: 256 LBS | HEART RATE: 49 BPM | OXYGEN SATURATION: 95 % | SYSTOLIC BLOOD PRESSURE: 129 MMHG | TEMPERATURE: 98.6 F | HEIGHT: 70 IN | RESPIRATION RATE: 20 BRPM

## 2023-11-17 VITALS — OXYGEN SATURATION: 96 %

## 2023-11-17 DIAGNOSIS — I48.91 ATRIAL FIBRILLATION AND FLUTTER (HCC): Primary | ICD-10-CM

## 2023-11-17 DIAGNOSIS — I48.92 ATRIAL FIBRILLATION AND FLUTTER (HCC): Primary | ICD-10-CM

## 2023-11-17 LAB
ECHO BSA: 2.39 M2
ECHO BSA: 2.39 M2
EKG ATRIAL RATE: 293 BPM
EKG ATRIAL RATE: 312 BPM
EKG ATRIAL RATE: 89 BPM
EKG P AXIS: -109 DEGREES
EKG P AXIS: -57 DEGREES
EKG P AXIS: 92 DEGREES
EKG Q-T INTERVAL: 436 MS
EKG Q-T INTERVAL: 454 MS
EKG Q-T INTERVAL: 460 MS
EKG QRS DURATION: 82 MS
EKG QRS DURATION: 82 MS
EKG QRS DURATION: 84 MS
EKG QTC CALCULATION (BAZETT): 405 MS
EKG QTC CALCULATION (BAZETT): 410 MS
EKG QTC CALCULATION (BAZETT): 422 MS
EKG R AXIS: 16 DEGREES
EKG R AXIS: 36 DEGREES
EKG R AXIS: 37 DEGREES
EKG T AXIS: 29 DEGREES
EKG T AXIS: 37 DEGREES
EKG T AXIS: 39 DEGREES
EKG VENTRICULAR RATE: 48 BPM
EKG VENTRICULAR RATE: 52 BPM
EKG VENTRICULAR RATE: 52 BPM

## 2023-11-17 PROCEDURE — 6360000002 HC RX W HCPCS: Performed by: SPECIALIST

## 2023-11-17 PROCEDURE — 92960 CARDIOVERSION ELECTRIC EXT: CPT | Performed by: SPECIALIST

## 2023-11-17 PROCEDURE — 92960 CARDIOVERSION ELECTRIC EXT: CPT

## 2023-11-17 PROCEDURE — 2580000003 HC RX 258: Performed by: STUDENT IN AN ORGANIZED HEALTH CARE EDUCATION/TRAINING PROGRAM

## 2023-11-17 PROCEDURE — 6370000000 HC RX 637 (ALT 250 FOR IP): Performed by: STUDENT IN AN ORGANIZED HEALTH CARE EDUCATION/TRAINING PROGRAM

## 2023-11-17 PROCEDURE — 6370000000 HC RX 637 (ALT 250 FOR IP): Performed by: SPECIALIST

## 2023-11-17 PROCEDURE — 6370000000 HC RX 637 (ALT 250 FOR IP)

## 2023-11-17 RX ORDER — DOFETILIDE 0.25 MG/1
250 CAPSULE ORAL 2 TIMES DAILY
Qty: 60 CAPSULE | Refills: 3 | Status: SHIPPED | OUTPATIENT
Start: 2023-11-17 | End: 2024-03-16

## 2023-11-17 RX ADMIN — PROPOFOL 60 MG: 10 INJECTION, EMULSION INTRAVENOUS at 13:12

## 2023-11-17 RX ADMIN — DOFETILIDE 250 MCG: 0.25 CAPSULE ORAL at 02:38

## 2023-11-17 RX ADMIN — SODIUM CHLORIDE, PRESERVATIVE FREE 10 ML: 5 INJECTION INTRAVENOUS at 09:12

## 2023-11-17 RX ADMIN — LISINOPRIL 20 MG: 20 TABLET ORAL at 09:09

## 2023-11-17 RX ADMIN — APIXABAN 5 MG: 5 TABLET, FILM COATED ORAL at 09:10

## 2023-11-17 RX ADMIN — METOPROLOL TARTRATE 25 MG: 25 TABLET ORAL at 09:10

## 2023-11-17 RX ADMIN — PANTOPRAZOLE SODIUM 40 MG: 40 TABLET, DELAYED RELEASE ORAL at 09:10

## 2023-11-17 ASSESSMENT — PAIN SCALES - GENERAL: PAINLEVEL_OUTOF10: 0

## 2023-11-17 NOTE — PROGRESS NOTES
TRANSFER - OUT REPORT:    Verbal report given to Viridiana veronica Mcknight being transferred to [facility] for routine post-op       Report consisted of patient's Situation, Background, Assessment and   Recommendations(SBAR). Information from the following report(s) Nurse Handoff Report was reviewed with the receiving nurse. Opportunity for questions and clarification was provided.       Patient transported with:   [employer]

## 2023-11-17 NOTE — PROGRESS NOTES
CLINICAL PHARMACY NOTE: MEDS TO BEDS    Total # of Prescriptions Filled: 1   The following medications were delivered to the patient:  Dofetilide 250 mg cap    Additional Documentation: Wife , clover payment.

## 2023-11-17 NOTE — DISCHARGE SUMMARY
[facility]  Discharge Note                 Name:  Stewart March  YOB: 1960  Social Security Number:  [id number]  Medical Record Number:  [id number]    Date of Admission:  11/14/2023  Date of Discharge:  11/17/2023    Admitting physician: Yuli Otto MD    Discharge Attending: PAULA Ghosh CNP, CNP  Primary Care Physician: PAULA Snider CNP  Consultants: Cardiology  Discharge to home in stable condition     HOSPITAL ADMISSION PROBLEM LIST:  Patient Active Problem List   Diagnosis    Hearing loss    GERD (gastroesophageal reflux disease)    Right knee pain    HTN (hypertension)    S/P cardiac cath    Coronary artery disease involving native coronary artery of native heart without angina pectoris    Anxiety    Primary osteoarthritis of left knee    Osteoarthritis of left knee    Status post left knee replacement    CAD in native artery    Status post right knee replacement    Typical atrial flutter ([address])    Thrombus of left atrial appendage    Encounter for monitoring anti-arrhythmic therapy    Atrial fibrillation and flutter ([address])         Procedures:DIONNE, CV     HOSPITAL COURSE :           The patient was admitted for: DIONNE, CV   Hospital Procedures if any: DIONNE, CV   Medications changes recommendation: see medication below   Follow Up Plan: as scheduled 2-3 weeks       Discharge exam:   Vitals:    11/17/23 1415   BP: 129/79   Pulse: (!) 49   Resp: 20   Temp:    SpO2:      Neuro: normal  Chest: Clear to ausculation. No wheezing. Cardiac: Regular rate. s1 and s2 auscultated. No murmur noted. Abdomen/groin: soft, non-tender, without masses or organomegaly  Lower extremity edema: none    Discharge Medications:     Medication List      START taking these medications     dofetilide 250 MCG capsule; Commonly known as: TIKOSYN; Take 1 capsule   by mouth 2 times daily;  Notes to patient: Heart rate     CONTINUE taking these medications     apixaban 5 MG Tabs tablet; at 2:59 [address] [facility]      [phone]

## 2023-11-17 NOTE — DISCHARGE INSTRUCTIONS
Take medication as prescribed. Call MD for any shortness of breath, lightheadedness, and chest pain.

## 2023-11-17 NOTE — PROGRESS NOTES
Ecg post DC CV showed 2nd degree av block type I,   Above was discussed with Pt's wife. Will observe and f/u as out Pt. He is ok to discharge home 3 hours from now if ok with Crdiology.

## 2023-11-17 NOTE — DISCHARGE INSTR - DIET
Good nutrition is important when healing from an illness, injury, or surgery. Follow any nutrition recommendations given to you during your hospital stay. If you were given an oral nutrition supplement while in the hospital, continue to take this supplement at home. You can take it with meals, in-between meals, and/or before bedtime. These supplements can be purchased at most local grocery stores, pharmacies, and chain [employer]-stores. If you have any questions about your diet or nutrition, call the hospital and ask for the dietitian.   Low fat low salt

## 2023-11-17 NOTE — PLAN OF CARE
Problem: Safety - Adult  Goal: Free from fall injury  Outcome: Progressing     Problem: Discharge Planning  Goal: Discharge to home or other facility with appropriate resources  Outcome: Progressing     Problem: ABCDS Injury Assessment  Goal: Absence of physical injury  Outcome: Progressing     Problem: Cardiovascular - Adult  Goal: Maintains optimal cardiac output and hemodynamic stability  Outcome: Progressing  Goal: Absence of cardiac dysrhythmias or at baseline  Outcome: Progressing

## 2023-11-17 NOTE — PLAN OF CARE
Problem: Cardiovascular - Adult  Goal: Absence of cardiac dysrhythmias or at baseline  11/17/2023 0500 by Zee Stout RN  Outcome: Progressing

## 2023-11-20 ENCOUNTER — HOSPITAL ENCOUNTER (OUTPATIENT)
Dept: SLEEP CENTER | Age: 63
Discharge: HOME | End: 2023-11-22
Payer: COMMERCIAL

## 2023-11-20 ENCOUNTER — TELEPHONE (OUTPATIENT)
Dept: FAMILY MEDICINE CLINIC | Age: 63
End: 2023-11-20

## 2023-11-20 DIAGNOSIS — G47.33 OSA (OBSTRUCTIVE SLEEP APNEA): ICD-10-CM

## 2023-11-20 LAB
EKG ATRIAL RATE: 293 BPM
EKG ATRIAL RATE: 312 BPM
EKG ATRIAL RATE: 89 BPM
EKG P AXIS: -109 DEGREES
EKG P AXIS: -57 DEGREES
EKG P AXIS: 92 DEGREES
EKG Q-T INTERVAL: 436 MS
EKG Q-T INTERVAL: 454 MS
EKG Q-T INTERVAL: 460 MS
EKG QRS DURATION: 82 MS
EKG QRS DURATION: 82 MS
EKG QRS DURATION: 84 MS
EKG QTC CALCULATION (BAZETT): 405 MS
EKG QTC CALCULATION (BAZETT): 410 MS
EKG QTC CALCULATION (BAZETT): 422 MS
EKG R AXIS: 16 DEGREES
EKG R AXIS: 36 DEGREES
EKG R AXIS: 37 DEGREES
EKG T AXIS: 29 DEGREES
EKG T AXIS: 37 DEGREES
EKG T AXIS: 39 DEGREES
EKG VENTRICULAR RATE: 48 BPM
EKG VENTRICULAR RATE: 52 BPM
EKG VENTRICULAR RATE: 52 BPM

## 2023-11-20 PROCEDURE — 95810 POLYSOM 6/> YRS 4/> PARAM: CPT

## 2023-11-20 NOTE — TELEPHONE ENCOUNTER
Care Transitions Initial Follow Up Call    Outreach made within 2 business days of discharge: Yes    Patient: Hever Andrade   Patient : 1960   MRN: [id number]    Reason for Admission: Afib  Discharge Date: 23       Spoke with: ana paula    Discharge department/facility: [facility] Interactive Patient Contact:  Was patient able to fill all prescriptions: Yes  Was patient instructed to bring all medications to the follow-up visit: Yes  Is patient taking all medications as directed in the discharge summary?  Yes  Does patient understand their discharge instructions: Yes  Does patient have questions or concerns that need addressed prior to 7-14 day follow up office visit: no    Scheduled appointment with PCP within 7-14 days    Follow Up  Future Appointments   Date Time Provider [address] [place] [address]   2023  7:30  [address] [address] [facility] 65   2023  1:00 PM Arik Norman MD [facility]   2023  8:00 AM PAULA Hanna - CNP [facility] DP   3/7/2024  8:45 AM MD Yazan Garcia LPN

## 2023-11-20 NOTE — TELEPHONE ENCOUNTER
Patient did not want to schedule any appointment at this time. He is going to be seeing a provider at [facility] going forward because that works better for him and his wife.

## 2023-11-27 ENCOUNTER — TELEPHONE (OUTPATIENT)
Dept: CARDIOLOGY | Age: 63
End: 2023-11-27

## 2023-11-27 NOTE — TELEPHONE ENCOUNTER
Recevied clearance request from [facility] for pt to have surgery on 12/6/23 and 12/13/23. Please review the attached form and advise.      Last Appt:  9/6/2023  Next Appt:   3/7/2024  Med verified in [address] [place] [address] 15

## 2023-11-29 ENCOUNTER — OFFICE VISIT (OUTPATIENT)
Age: 63
End: 2023-11-29
Payer: COMMERCIAL

## 2023-11-29 VITALS
BODY MASS INDEX: 36.79 KG/M2 | HEIGHT: 70 IN | OXYGEN SATURATION: 97 % | HEART RATE: 92 BPM | WEIGHT: 257 LBS | RESPIRATION RATE: 16 BRPM | SYSTOLIC BLOOD PRESSURE: 158 MMHG | DIASTOLIC BLOOD PRESSURE: 80 MMHG

## 2023-11-29 DIAGNOSIS — I44.1 2ND DEGREE AV BLOCK: Primary | ICD-10-CM

## 2023-11-29 PROCEDURE — 99214 OFFICE O/P EST MOD 30 MIN: CPT | Performed by: SPECIALIST

## 2023-11-29 PROCEDURE — 3079F DIAST BP 80-89 MM HG: CPT | Performed by: SPECIALIST

## 2023-11-29 PROCEDURE — 3077F SYST BP >= 140 MM HG: CPT | Performed by: SPECIALIST

## 2023-11-29 PROCEDURE — 93000 ELECTROCARDIOGRAM COMPLETE: CPT | Performed by: SPECIALIST

## 2023-11-29 ASSESSMENT — ENCOUNTER SYMPTOMS
ALLERGIC/IMMUNOLOGIC NEGATIVE: 1
GASTROINTESTINAL NEGATIVE: 1
SHORTNESS OF BREATH: 1

## 2023-11-29 NOTE — TELEPHONE ENCOUNTER
Pt and wife called to ask if Dr Jaad Moreno could take care of this clearance form. Pt wife states they needed to get it faxed back by 11/24/23 by noon. They need this faxed to [phone] asap as appt dates are next week.     Last Appt:  9/6/2023  Next Appt:   3/7/2024  Med verified in [address] [place] [address]

## 2023-11-29 NOTE — PROGRESS NOTES
[address] [address]     Date of Visit:  2023  Patient Name: Iqra Cottrell   Patient :  1960     CHIEF COMPLAINT/HPI:     Iqra Cottrell is a 66 y.o. male who presents today for an general visit to be evaluated for the following condition(s):  Chief Complaint   Patient presents with    Post-Op Check     S/p cardioversion    Patient was admitted to the hospital was a started on Tikosyn 0.25 mg p.o. twice daily he tolerated the. It did not put him back in sinus rhythm. He ended up with DC cardioversion. After the cardioversion he was diagnosed to have second-degree AV block type I. Patient was asymptomatic. We decided to observe him and discharged him home. He comes in today he feels probably better since back in sinus rhythm. It is not easy to please this gentleman always an issue no matter what we do. In the hospital and today I told him he still could be a candidate for permanent pacemaker. Also I told him that he will be a good candidate for pulmonary vein isolation. At this point will do an EKG today and give him 48-hour Holter monitor looking for intermittent advanced AV block. Plans will be done accordingly. When I examined him today he was very regular and faster than 60 bpm.  EKG is pending and I am waiting for the test to be done. REVIEW OF SYSTEM      Review of Systems   Constitutional:  Negative for activity change. HENT: Negative. Eyes:  Positive for visual disturbance. Respiratory:  Positive for shortness of breath. Cardiovascular: Negative. Negative for palpitations. Gastrointestinal: Negative. Endocrine: Negative. Genitourinary: Negative. Musculoskeletal: Negative. Skin: Negative. Allergic/Immunologic: Negative. Neurological: Negative. Hematological: Negative. Psychiatric/Behavioral: Negative.          REVIEWED

## 2023-11-29 NOTE — PROGRESS NOTES
[address] [facility] [address]     Date of Visit:  2023  Patient Name: Jodee Duran   Patient :  1960     CHIEF COMPLAINT/HPI:     Jodee Duran is a 66 y.o. male who presents today for an general visit to be evaluated for the following condition(s):  Chief Complaint   Patient presents with    Post-Op Check     S/p cardioversion        REVIEW OF SYSTEM      Review of Systems    REVIEWED INFORMATION      Allergies   Allergen Reactions    Ciprofloxacin Itching and Rash       Current Outpatient Medications   Medication Sig Dispense Refill    dofetilide (TIKOSYN) 250 MCG capsule Take 1 capsule by mouth 2 times daily 60 capsule 3    atorvastatin (LIPITOR) 40 MG tablet Take 1 tablet by mouth nightly 90 tablet 3    lisinopril (PRINIVIL;ZESTRIL) 20 MG tablet Take 1 tablet by mouth once daily 90 tablet 1    tamsulosin (FLOMAX) 0.4 MG capsule Take 1 capsule by mouth once daily 90 capsule 1    clobetasol (TEMOVATE) 0.05 % ointment Apply topically 2 times daily. 30 g 0    apixaban (ELIQUIS) 5 MG TABS tablet Take 1 tablet by mouth 2 times daily 180 tablet 1    Handicap Placard MISC by Does not apply route Expires: 3/18/2027 1 each 0    esomeprazole (NEXIUM) 20 MG delayed release capsule Take 1 capsule by mouth daily      calcium carbonate 600 MG TABS tablet Take 1 tablet by mouth daily       No current facility-administered medications for this visit.         Patient Active Problem List   Diagnosis    Hearing loss    GERD (gastroesophageal reflux disease)    Right knee pain    HTN (hypertension)    S/P cardiac cath    Coronary artery disease involving native coronary artery of native heart without angina pectoris    Anxiety    Primary osteoarthritis of left knee    Osteoarthritis of left knee    Status post left knee replacement    CAD in native artery    Status post right knee replacement    Typical

## 2023-11-30 LAB — ECHO BSA: 2.39 M2

## 2023-11-30 NOTE — TELEPHONE ENCOUNTER
Low risk for cataract surgery from cardiac standpoint. Do they need to hold anticoagulation?    If yes this eliquis  be held 48 hours before

## 2023-12-27 ENCOUNTER — HOSPITAL ENCOUNTER (OUTPATIENT)
Age: 63
Discharge: HOME | End: 2023-12-29
Attending: SPECIALIST
Payer: COMMERCIAL

## 2023-12-27 DIAGNOSIS — I44.1 2ND DEGREE AV BLOCK: ICD-10-CM

## 2023-12-27 PROCEDURE — 93225 XTRNL ECG REC<48 HRS REC: CPT

## 2024-01-17 ENCOUNTER — OFFICE VISIT (OUTPATIENT)
Age: 64
End: 2024-01-17
Payer: COMMERCIAL

## 2024-01-17 VITALS
RESPIRATION RATE: 17 BRPM | TEMPERATURE: 97.7 F | OXYGEN SATURATION: 95 % | WEIGHT: 261 LBS | DIASTOLIC BLOOD PRESSURE: 86 MMHG | HEART RATE: 81 BPM | SYSTOLIC BLOOD PRESSURE: 134 MMHG | HEIGHT: 70 IN | BODY MASS INDEX: 37.37 KG/M2

## 2024-01-17 DIAGNOSIS — E66.01 CLASS 2 SEVERE OBESITY DUE TO EXCESS CALORIES WITH SERIOUS COMORBIDITY IN ADULT, UNSPECIFIED BMI (HCC): ICD-10-CM

## 2024-01-17 DIAGNOSIS — I48.0 PAROXYSMAL ATRIAL FIBRILLATION (HCC): Primary | ICD-10-CM

## 2024-01-17 DIAGNOSIS — I44.1 2ND DEGREE AV BLOCK: ICD-10-CM

## 2024-01-17 PROCEDURE — 3075F SYST BP GE 130 - 139MM HG: CPT | Performed by: SPECIALIST

## 2024-01-17 PROCEDURE — 99214 OFFICE O/P EST MOD 30 MIN: CPT | Performed by: SPECIALIST

## 2024-01-17 PROCEDURE — 3079F DIAST BP 80-89 MM HG: CPT | Performed by: SPECIALIST

## 2024-01-17 NOTE — PROGRESS NOTES
[facility]  [address] [address], [address]  [facility]  [address]    Date of Visit:  2024  Patient Name: Ritchie Garcia   Patient :  1960   Referring By:  No ref. provider found     CHIEF COMPLAINT/HPI:     Ritchie Garcia is a 63 y.o. male who presents today for an general visit to be evaluated for the following condition(s):  Chief Complaint   Patient presents with    Follow-up     4 week f/u to discuss holter monitor   Patient has been doing fairly well since we cardioverted him on Tikosyn.  No symptoms of intolerance to Tikosyn no symptoms of toxicity.  No symptoms of recurrence of atrial fibrillation.  The Holter monitor showed that he was in sinus rhythm throughout the whole 48 hours.  There are episodes of 2-1 AV block mainly nocturia nightly.  There is also evidence of second-degree AV block type I.  1 run of nonsustained ventricular tachycardia.  Patient was unaware of all of those rhythm issues.    Patient has been doing fairly well otherwise and feels better since he is out of atrial fibrillation.    He understands that he is a candidate for a permanent pacemaker at 1 point in the future.    We had a lengthy discussion in the presence of his wife about that Dickason is not going to be guaranteed to prevent recurrence of atrial fibrillation and this is a good time for ablation to be done.  The procedure of PVI with all possible risks and complications benefits and expectations were discussed with both the patient and his wife.  Questions were answered.  They want time to think about it.    They understands that Tikosyn is not risk-free to and if any time kidney issues might change the balance and tipped the balance of Tikosyn.  He understands that weight sleep apnea alcohol and smoking are bad for atrial fibrillation and that he should start making significant decisions about his actions especially weight and alcohol.  He tells me sleep apnea is a still in the process

## 2024-02-14 ENCOUNTER — OFFICE VISIT (OUTPATIENT)
Age: 64
End: 2024-02-14
Payer: COMMERCIAL

## 2024-02-14 VITALS
WEIGHT: 260 LBS | HEART RATE: 80 BPM | HEIGHT: 70 IN | DIASTOLIC BLOOD PRESSURE: 84 MMHG | TEMPERATURE: 97.7 F | OXYGEN SATURATION: 96 % | RESPIRATION RATE: 18 BRPM | BODY MASS INDEX: 37.22 KG/M2 | SYSTOLIC BLOOD PRESSURE: 145 MMHG

## 2024-02-14 DIAGNOSIS — I48.0 PAROXYSMAL ATRIAL FIBRILLATION (HCC): Primary | ICD-10-CM

## 2024-02-14 DIAGNOSIS — F10.10 ALCOHOL ABUSE: ICD-10-CM

## 2024-02-14 DIAGNOSIS — I44.1 2ND DEGREE AV BLOCK: ICD-10-CM

## 2024-02-14 DIAGNOSIS — E66.01 CLASS 2 SEVERE OBESITY DUE TO EXCESS CALORIES WITH SERIOUS COMORBIDITY IN ADULT, UNSPECIFIED BMI (HCC): ICD-10-CM

## 2024-02-14 PROBLEM — I48.92 ATRIAL FLUTTER (HCC): Status: ACTIVE | Noted: 2023-09-06

## 2024-02-14 PROCEDURE — 3079F DIAST BP 80-89 MM HG: CPT | Performed by: SPECIALIST

## 2024-02-14 PROCEDURE — 99214 OFFICE O/P EST MOD 30 MIN: CPT | Performed by: SPECIALIST

## 2024-02-14 PROCEDURE — 3077F SYST BP >= 140 MM HG: CPT | Performed by: SPECIALIST

## 2024-02-14 NOTE — PROGRESS NOTES
[facility] CARDIAC ELECTROPHYSIOLOGY  [address], [address]  [facility]  [address]    Date of Visit:  2024  Patient Name: Ritchie Garcia   Patient :  1960   Referring By:  No ref. provider found     CHIEF COMPLAINT/HPI:     Ritchie Garcia is a 63 y.o. male who presents today for an general visit to be evaluated for the following condition(s):  Chief Complaint   Patient presents with    Follow-up     4 week f/u post holter testing     Patient presented today for discussion about the PVI procedure.  He made his mind and wants it done.  The procedure once again discussed with the patient and his wife in all details.  He now understands it and agrees for it.    We also reviewed the 48 Holter monitor that was done.  It showed sinus rhythm.  No atrial fibrillation.  Only when he is expected to be asleep he goes to second-degree AV block type I.  There is episodes of what looks to be may be sinus tachycardia and nonsustained ventricular tachycardia.:  All no malignant arrhythmias.    Discussion about the pacemaker was done again.  He knows that at 1 point he will end up needing a permanent pacemaker and it is not for now.    He continues to take his Tikosyn with no indication of arrhythmias and continues to take the rest of medications including Eliquis 5 mg p.o. twice daily.    The ablation procedure for A-fib was discussed again again.  He is going to continue to take the Eliquis till the morning of the procedure he will take it only with sips of water.  REVIEW OF SYSTEM      Review of Systems  Noncontributory  REVIEWED INFORMATION      Allergies   Allergen Reactions    Ciprofloxacin Itching and Rash       Current Outpatient Medications   Medication Sig Dispense Refill    dofetilide (TIKOSYN) 250 MCG capsule Take 1 capsule by mouth 2 times daily 60 capsule 3    atorvastatin (LIPITOR) 40 MG tablet Take 1 tablet by mouth nightly 90 tablet 3    lisinopril (PRINIVIL;ZESTRIL) 20 MG tablet Take 1 tablet by

## 2024-02-29 ENCOUNTER — ANESTHESIA EVENT (OUTPATIENT)
Age: 64
End: 2024-02-29
Payer: COMMERCIAL

## 2024-02-29 ENCOUNTER — HOSPITAL ENCOUNTER (OUTPATIENT)
Age: 64
Discharge: HOME | End: 2024-02-29
Attending: INTERNAL MEDICINE | Admitting: SPECIALIST
Payer: COMMERCIAL

## 2024-02-29 ENCOUNTER — ANESTHESIA (OUTPATIENT)
Age: 64
End: 2024-02-29
Payer: COMMERCIAL

## 2024-02-29 VITALS
RESPIRATION RATE: 18 BRPM | TEMPERATURE: 96.1 F | DIASTOLIC BLOOD PRESSURE: 80 MMHG | HEART RATE: 73 BPM | SYSTOLIC BLOOD PRESSURE: 130 MMHG | OXYGEN SATURATION: 94 %

## 2024-02-29 DIAGNOSIS — I48.92 ATRIAL FLUTTER (HCC): ICD-10-CM

## 2024-02-29 DIAGNOSIS — I48.0 PAROXYSMAL ATRIAL FIBRILLATION (HCC): Primary | ICD-10-CM

## 2024-02-29 PROBLEM — Z98.890 S/P ABLATION OF ATRIAL FIBRILLATION: Status: ACTIVE | Noted: 2024-02-29

## 2024-02-29 PROBLEM — Z86.79 S/P ABLATION OF ATRIAL FIBRILLATION: Status: ACTIVE | Noted: 2024-02-29

## 2024-02-29 LAB
ABO + RH BLD: NORMAL
ACT BLD: 162 SEC (ref 79–149)
ACT BLD: 255 SEC (ref 79–149)
ACT BLD: 294 SEC (ref 79–149)
ACT BLD: 313 SEC (ref 79–149)
ACT BLD: 325 SEC (ref 79–149)
ACT BLD: 337 SEC (ref 79–149)
ALBUMIN SERPL-MCNC: 3.8 G/DL (ref 3.5–5.2)
ALBUMIN/GLOB SERPL: 1.2 {RATIO} (ref 1–2.5)
ALP SERPL-CCNC: 100 U/L (ref 40–129)
ALT SERPL-CCNC: 27 U/L (ref 5–41)
ANION GAP SERPL CALCULATED.3IONS-SCNC: 8 MMOL/L (ref 9–17)
ARM BAND NUMBER: NORMAL
AST SERPL-CCNC: 22 U/L
BILIRUB SERPL-MCNC: 0.4 MG/DL (ref 0.3–1.2)
BLOOD BANK SAMPLE EXPIRATION: NORMAL
BLOOD GROUP ANTIBODIES SERPL: NEGATIVE
BUN SERPL-MCNC: 11 MG/DL (ref 8–23)
CALCIUM SERPL-MCNC: 9 MG/DL (ref 8.6–10.4)
CHLORIDE SERPL-SCNC: 104 MMOL/L (ref 98–107)
CO2 SERPL-SCNC: 25 MMOL/L (ref 20–31)
CREAT SERPL-MCNC: 0.7 MG/DL (ref 0.7–1.2)
GFR SERPL CREATININE-BSD FRML MDRD: >60 ML/MIN/1.73M2
GLUCOSE SERPL-MCNC: 98 MG/DL (ref 70–99)
POTASSIUM SERPL-SCNC: 4.5 MMOL/L (ref 3.7–5.3)
PROT SERPL-MCNC: 6.9 G/DL (ref 6.4–8.3)
SODIUM SERPL-SCNC: 137 MMOL/L (ref 135–144)

## 2024-02-29 PROCEDURE — C1892 INTRO/SHEATH,FIXED,PEEL-AWAY: HCPCS | Performed by: INTERNAL MEDICINE

## 2024-02-29 PROCEDURE — 2500000003 HC RX 250 WO HCPCS

## 2024-02-29 PROCEDURE — 93662 INTRACARDIAC ECG (ICE): CPT | Performed by: SPECIALIST

## 2024-02-29 PROCEDURE — 7100000010 HC PHASE II RECOVERY - FIRST 15 MIN: Performed by: INTERNAL MEDICINE

## 2024-02-29 PROCEDURE — 93656 COMPRE EP EVAL ABLTJ ATR FIB: CPT | Performed by: SPECIALIST

## 2024-02-29 PROCEDURE — 86900 BLOOD TYPING SEROLOGIC ABO: CPT

## 2024-02-29 PROCEDURE — 3700000000 HC ANESTHESIA ATTENDED CARE: Performed by: INTERNAL MEDICINE

## 2024-02-29 PROCEDURE — 2580000003 HC RX 258: Performed by: INTERNAL MEDICINE

## 2024-02-29 PROCEDURE — 1200000000 HC SEMI PRIVATE

## 2024-02-29 PROCEDURE — 3700000001 HC ADD 15 MINUTES (ANESTHESIA): Performed by: SPECIALIST

## 2024-02-29 PROCEDURE — C1769 GUIDE WIRE: HCPCS | Performed by: INTERNAL MEDICINE

## 2024-02-29 PROCEDURE — 7100000010 HC PHASE II RECOVERY - FIRST 15 MIN: Performed by: SPECIALIST

## 2024-02-29 PROCEDURE — C1894 INTRO/SHEATH, NON-LASER: HCPCS | Performed by: INTERNAL MEDICINE

## 2024-02-29 PROCEDURE — C1892 INTRO/SHEATH,FIXED,PEEL-AWAY: HCPCS | Performed by: SPECIALIST

## 2024-02-29 PROCEDURE — C1766 INTRO/SHEATH,STRBLE,NON-PEEL: HCPCS | Performed by: INTERNAL MEDICINE

## 2024-02-29 PROCEDURE — C1760 CLOSURE DEV, VASC: HCPCS | Performed by: SPECIALIST

## 2024-02-29 PROCEDURE — 86850 RBC ANTIBODY SCREEN: CPT

## 2024-02-29 PROCEDURE — 6360000002 HC RX W HCPCS

## 2024-02-29 PROCEDURE — 2709999900 HC NON-CHARGEABLE SUPPLY: Performed by: SPECIALIST

## 2024-02-29 PROCEDURE — C1730 CATH, EP, 19 OR FEW ELECT: HCPCS | Performed by: SPECIALIST

## 2024-02-29 PROCEDURE — 93613 INTRACARDIAC EPHYS 3D MAPG: CPT | Performed by: SPECIALIST

## 2024-02-29 PROCEDURE — 7100000011 HC PHASE II RECOVERY - ADDTL 15 MIN: Performed by: INTERNAL MEDICINE

## 2024-02-29 PROCEDURE — C1766 INTRO/SHEATH,STRBLE,NON-PEEL: HCPCS | Performed by: SPECIALIST

## 2024-02-29 PROCEDURE — C1733 CATH, EP, OTHR THAN COOL-TIP: HCPCS | Performed by: SPECIALIST

## 2024-02-29 PROCEDURE — C1894 INTRO/SHEATH, NON-LASER: HCPCS | Performed by: SPECIALIST

## 2024-02-29 PROCEDURE — C1730 CATH, EP, 19 OR FEW ELECT: HCPCS | Performed by: INTERNAL MEDICINE

## 2024-02-29 PROCEDURE — 80053 COMPREHEN METABOLIC PANEL: CPT

## 2024-02-29 PROCEDURE — 6360000002 HC RX W HCPCS: Performed by: ANESTHESIOLOGY

## 2024-02-29 PROCEDURE — C1760 CLOSURE DEV, VASC: HCPCS | Performed by: INTERNAL MEDICINE

## 2024-02-29 PROCEDURE — 2709999900 HC NON-CHARGEABLE SUPPLY: Performed by: INTERNAL MEDICINE

## 2024-02-29 PROCEDURE — 7100000000 HC PACU RECOVERY - FIRST 15 MIN: Performed by: SPECIALIST

## 2024-02-29 PROCEDURE — 86901 BLOOD TYPING SEROLOGIC RH(D): CPT

## 2024-02-29 PROCEDURE — 2580000003 HC RX 258

## 2024-02-29 PROCEDURE — 7100000011 HC PHASE II RECOVERY - ADDTL 15 MIN: Performed by: SPECIALIST

## 2024-02-29 PROCEDURE — C1769 GUIDE WIRE: HCPCS | Performed by: SPECIALIST

## 2024-02-29 PROCEDURE — 99238 HOSP IP/OBS DSCHRG MGMT 30/<: CPT | Performed by: SPECIALIST

## 2024-02-29 PROCEDURE — 3700000000 HC ANESTHESIA ATTENDED CARE: Performed by: SPECIALIST

## 2024-02-29 PROCEDURE — 7100000001 HC PACU RECOVERY - ADDTL 15 MIN: Performed by: SPECIALIST

## 2024-02-29 PROCEDURE — C1733 CATH, EP, OTHR THAN COOL-TIP: HCPCS | Performed by: INTERNAL MEDICINE

## 2024-02-29 PROCEDURE — 85347 COAGULATION TIME ACTIVATED: CPT

## 2024-02-29 PROCEDURE — 3700000001 HC ADD 15 MINUTES (ANESTHESIA): Performed by: INTERNAL MEDICINE

## 2024-02-29 PROCEDURE — 93005 ELECTROCARDIOGRAM TRACING: CPT | Performed by: SPECIALIST

## 2024-02-29 RX ORDER — HEPARIN SODIUM 1000 [USP'U]/ML
INJECTION, SOLUTION INTRAVENOUS; SUBCUTANEOUS PRN
Status: DISCONTINUED | OUTPATIENT
Start: 2024-02-29 | End: 2024-02-29 | Stop reason: SDUPTHER

## 2024-02-29 RX ORDER — FENTANYL CITRATE 50 UG/ML
25 INJECTION, SOLUTION INTRAMUSCULAR; INTRAVENOUS EVERY 5 MIN PRN
Status: DISCONTINUED | OUTPATIENT
Start: 2024-02-29 | End: 2024-02-29 | Stop reason: HOSPADM

## 2024-02-29 RX ORDER — LIDOCAINE HYDROCHLORIDE 10 MG/ML
INJECTION, SOLUTION EPIDURAL; INFILTRATION; INTRACAUDAL; PERINEURAL PRN
Status: DISCONTINUED | OUTPATIENT
Start: 2024-02-29 | End: 2024-02-29 | Stop reason: SDUPTHER

## 2024-02-29 RX ORDER — ACETAMINOPHEN 325 MG/1
650 TABLET ORAL EVERY 4 HOURS PRN
Status: DISCONTINUED | OUTPATIENT
Start: 2024-02-29 | End: 2024-02-29 | Stop reason: HOSPADM

## 2024-02-29 RX ORDER — FENTANYL CITRATE 50 UG/ML
INJECTION, SOLUTION INTRAMUSCULAR; INTRAVENOUS PRN
Status: DISCONTINUED | OUTPATIENT
Start: 2024-02-29 | End: 2024-02-29 | Stop reason: SDUPTHER

## 2024-02-29 RX ORDER — SODIUM CHLORIDE 0.9 % (FLUSH) 0.9 %
5-40 SYRINGE (ML) INJECTION PRN
Status: DISCONTINUED | OUTPATIENT
Start: 2024-02-29 | End: 2024-02-29 | Stop reason: HOSPADM

## 2024-02-29 RX ORDER — MIDAZOLAM HYDROCHLORIDE 1 MG/ML
INJECTION INTRAMUSCULAR; INTRAVENOUS PRN
Status: DISCONTINUED | OUTPATIENT
Start: 2024-02-29 | End: 2024-02-29 | Stop reason: SDUPTHER

## 2024-02-29 RX ORDER — ONDANSETRON 2 MG/ML
INJECTION INTRAMUSCULAR; INTRAVENOUS PRN
Status: DISCONTINUED | OUTPATIENT
Start: 2024-02-29 | End: 2024-02-29 | Stop reason: SDUPTHER

## 2024-02-29 RX ORDER — HEPARIN SODIUM 10000 [USP'U]/100ML
INJECTION, SOLUTION INTRAVENOUS CONTINUOUS PRN
Status: DISCONTINUED | OUTPATIENT
Start: 2024-02-29 | End: 2024-02-29 | Stop reason: SDUPTHER

## 2024-02-29 RX ORDER — SODIUM CHLORIDE 0.9 % (FLUSH) 0.9 %
5-40 SYRINGE (ML) INJECTION EVERY 12 HOURS SCHEDULED
Status: DISCONTINUED | OUTPATIENT
Start: 2024-02-29 | End: 2024-02-29 | Stop reason: HOSPADM

## 2024-02-29 RX ORDER — ONDANSETRON 2 MG/ML
4 INJECTION INTRAMUSCULAR; INTRAVENOUS
Status: DISCONTINUED | OUTPATIENT
Start: 2024-02-29 | End: 2024-02-29 | Stop reason: HOSPADM

## 2024-02-29 RX ORDER — FENTANYL CITRATE 50 UG/ML
50 INJECTION, SOLUTION INTRAMUSCULAR; INTRAVENOUS EVERY 5 MIN PRN
Status: DISCONTINUED | OUTPATIENT
Start: 2024-02-29 | End: 2024-02-29 | Stop reason: HOSPADM

## 2024-02-29 RX ORDER — DEXAMETHASONE SODIUM PHOSPHATE 10 MG/ML
INJECTION INTRAMUSCULAR; INTRAVENOUS PRN
Status: DISCONTINUED | OUTPATIENT
Start: 2024-02-29 | End: 2024-02-29 | Stop reason: SDUPTHER

## 2024-02-29 RX ORDER — PROTAMINE SULFATE 10 MG/ML
INJECTION, SOLUTION INTRAVENOUS PRN
Status: DISCONTINUED | OUTPATIENT
Start: 2024-02-29 | End: 2024-02-29 | Stop reason: SDUPTHER

## 2024-02-29 RX ORDER — PROPOFOL 10 MG/ML
INJECTION, EMULSION INTRAVENOUS PRN
Status: DISCONTINUED | OUTPATIENT
Start: 2024-02-29 | End: 2024-02-29 | Stop reason: SDUPTHER

## 2024-02-29 RX ORDER — SODIUM CHLORIDE 9 MG/ML
INJECTION, SOLUTION INTRAVENOUS PRN
Status: DISCONTINUED | OUTPATIENT
Start: 2024-02-29 | End: 2024-02-29 | Stop reason: HOSPADM

## 2024-02-29 RX ORDER — SODIUM CHLORIDE 9 MG/ML
INJECTION, SOLUTION INTRAVENOUS CONTINUOUS PRN
Status: DISCONTINUED | OUTPATIENT
Start: 2024-02-29 | End: 2024-02-29 | Stop reason: SDUPTHER

## 2024-02-29 RX ORDER — ROCURONIUM BROMIDE 10 MG/ML
INJECTION, SOLUTION INTRAVENOUS PRN
Status: DISCONTINUED | OUTPATIENT
Start: 2024-02-29 | End: 2024-02-29 | Stop reason: SDUPTHER

## 2024-02-29 RX ORDER — PHENYLEPHRINE HCL IN 0.9% NACL 1 MG/10 ML
SYRINGE (ML) INTRAVENOUS PRN
Status: DISCONTINUED | OUTPATIENT
Start: 2024-02-29 | End: 2024-02-29 | Stop reason: SDUPTHER

## 2024-02-29 RX ORDER — PANTOPRAZOLE SODIUM 40 MG/1
40 TABLET, DELAYED RELEASE ORAL
Status: DISCONTINUED | OUTPATIENT
Start: 2024-02-29 | End: 2024-02-29 | Stop reason: HOSPADM

## 2024-02-29 RX ORDER — SODIUM CHLORIDE 9 MG/ML
INJECTION, SOLUTION INTRAVENOUS CONTINUOUS
Status: DISCONTINUED | OUTPATIENT
Start: 2024-02-29 | End: 2024-02-29 | Stop reason: HOSPADM

## 2024-02-29 RX ADMIN — PROPOFOL 150 MG: 10 INJECTION, EMULSION INTRAVENOUS at 13:17

## 2024-02-29 RX ADMIN — HEPARIN SODIUM 4000 UNITS: 1000 INJECTION, SOLUTION INTRAVENOUS; SUBCUTANEOUS at 15:24

## 2024-02-29 RX ADMIN — SODIUM CHLORIDE: 9 INJECTION, SOLUTION INTRAVENOUS at 10:43

## 2024-02-29 RX ADMIN — FENTANYL CITRATE 50 MCG: 50 INJECTION INTRAMUSCULAR; INTRAVENOUS at 17:06

## 2024-02-29 RX ADMIN — DEXAMETHASONE SODIUM PHOSPHATE 10 MG: 10 INJECTION INTRAMUSCULAR; INTRAVENOUS at 14:14

## 2024-02-29 RX ADMIN — Medication 200 MCG: at 14:47

## 2024-02-29 RX ADMIN — HEPARIN SODIUM 3000 UNITS: 1000 INJECTION, SOLUTION INTRAVENOUS; SUBCUTANEOUS at 14:57

## 2024-02-29 RX ADMIN — Medication 200 MCG: at 13:58

## 2024-02-29 RX ADMIN — PROTAMINE SULFATE 40 MG: 10 INJECTION, SOLUTION INTRAVENOUS at 16:19

## 2024-02-29 RX ADMIN — ONDANSETRON 4 MG: 2 INJECTION INTRAMUSCULAR; INTRAVENOUS at 15:51

## 2024-02-29 RX ADMIN — HEPARIN SODIUM 10000 UNITS: 1000 INJECTION, SOLUTION INTRAVENOUS; SUBCUTANEOUS at 14:03

## 2024-02-29 RX ADMIN — ROCURONIUM BROMIDE 50 MG: 10 INJECTION, SOLUTION INTRAVENOUS at 13:18

## 2024-02-29 RX ADMIN — SUGAMMADEX 200 MG: 100 INJECTION, SOLUTION INTRAVENOUS at 16:23

## 2024-02-29 RX ADMIN — Medication 100 MCG: at 13:30

## 2024-02-29 RX ADMIN — Medication 150 MCG: at 13:52

## 2024-02-29 RX ADMIN — FENTANYL CITRATE 100 MCG: 50 INJECTION, SOLUTION INTRAMUSCULAR; INTRAVENOUS at 13:17

## 2024-02-29 RX ADMIN — Medication 200 MCG: at 14:09

## 2024-02-29 RX ADMIN — Medication 225 MCG: at 14:52

## 2024-02-29 RX ADMIN — Medication 200 MCG: at 16:10

## 2024-02-29 RX ADMIN — Medication 200 MCG: at 14:10

## 2024-02-29 RX ADMIN — Medication 200 MCG: at 14:03

## 2024-02-29 RX ADMIN — MIDAZOLAM 2 MG: 1 INJECTION INTRAMUSCULAR; INTRAVENOUS at 13:11

## 2024-02-29 RX ADMIN — Medication 100 MCG: at 13:27

## 2024-02-29 RX ADMIN — HEPARIN SODIUM 3000 UNITS/HR: 10000 INJECTION, SOLUTION INTRAVENOUS at 14:04

## 2024-02-29 RX ADMIN — SODIUM CHLORIDE: 900 INJECTION INTRAVENOUS at 13:35

## 2024-02-29 RX ADMIN — Medication 225 MCG: at 14:54

## 2024-02-29 RX ADMIN — Medication 200 MCG: at 13:39

## 2024-02-29 RX ADMIN — Medication 200 MCG: at 13:41

## 2024-02-29 RX ADMIN — PHENYLEPHRINE HYDROCHLORIDE 50 MCG/MIN: 10 INJECTION INTRAVENOUS at 13:58

## 2024-02-29 RX ADMIN — PROPOFOL 30 MG: 10 INJECTION, EMULSION INTRAVENOUS at 14:43

## 2024-02-29 RX ADMIN — Medication 150 MCG: at 13:53

## 2024-02-29 RX ADMIN — Medication 100 MCG: at 13:35

## 2024-02-29 RX ADMIN — Medication 250 MCG: at 16:18

## 2024-02-29 RX ADMIN — LIDOCAINE HYDROCHLORIDE 50 MG: 10 INJECTION, SOLUTION EPIDURAL; INFILTRATION; INTRACAUDAL; PERINEURAL at 13:17

## 2024-02-29 ASSESSMENT — PAIN SCALES - GENERAL: PAINLEVEL_OUTOF10: 7

## 2024-02-29 ASSESSMENT — PAIN DESCRIPTION - LOCATION: LOCATION: BACK

## 2024-02-29 NOTE — PROGRESS NOTES
Received post procedure to [facility] to room 9. Assessment obtained. Restrictions reviewed with patient. Post procedure pathway initiated.  Right groin site soft, dressing dry and intact.  No hematoma noted.  Family at side.  Patient without complaints. Head of bed flat with right leg straight. Right radial artline intact, CDI.

## 2024-02-29 NOTE — PROGRESS NOTES
ACT-162, Rt. Radial art line pulled per protocol.  Manual pressure applied x10min.  Site soft, no hematoma, no drainage.  Pressure dressing applied.

## 2024-02-29 NOTE — PROGRESS NOTES
Patient admitted, consent signed and questions answered. Patient ready for procedure. Call light to reach with side rails up 2 of 2. B/L Groin clipped with writer and Sierra C[place]AND present.  Family at bedside with patient.  History and physical complete.

## 2024-02-29 NOTE — DISCHARGE SUMMARY
Discharge Summary    Date: 2/29/2024  Patient Name: Ritchie Garcia    YOB: 1960     Age: 63 y.o.    Admit Date: 2/29/2024  Discharge Date: 2/29/2024  Discharge Condition: Good    Admission Diagnosis  Atrial flutter (HCC) [I48.92];S/P ablation of atrial fibrillation [Z98.890, Z86.79]      Discharge Diagnosis  Principal Problem:    Atrial flutter (HCC)  Active Problems:    S/P ablation of atrial fibrillation  Resolved Problems:    * No resolved hospital problems. *      Hospital Stay  Narrative of Hospital Course:  pvi    Consultants:  None    Surgeries/procedures Performed:      Treatments:            Discharge Plan/Disposition:  Home    Hospital/Incidental Findings Requiring Follow Up:    Patient Instructions:    Diet: Low Fat, Low Cholesterol Diet    Activity:Bedrest, No Lifting, Driving or Strenuous Excercise, No Driving While on Analgesics and No Heavy Lifting  For number of days (if applicable):      Other Instructions:    Provider Follow-Up:   No follow-ups on file.     Significant Diagnostic Studies:    Recent Labs:  Admission on 02/29/2024  Blood Bank Sample Expiration                  Date: 02/29/2024  Value: 03/03/2024,2359                       Status: Final  Arm Band Number                               Date: 02/29/2024  Value: [id number]     Status: Final  ABO/Rh                                        Date: 02/29/2024  Value: O POSITIVE    Status: Final  Antibody Screen                               Date: 02/29/2024  Value: NEGATIVE      Status: Final  Sodium                                        Date: 02/29/2024  Value: 137         Ref range: 135 - 144 mmol/L   Status: Final  Potassium                                     Date: 02/29/2024  Value: 4.5         Ref range: 3.7 - 5.3 mmol/L   Status: Final  Chloride                                      Date: 02/29/2024  Value: 104         Ref range: 98 - 107 mmol/L    Status: Final  CO2                                           Date:  02/29/2024  Value: 25          Ref range: 20 - 31 mmol/L     Status: Final  Anion Gap                                     Date: 02/29/2024  Value: 8 (L)       Ref range: 9 - 17 mmol/L      Status: Final  Glucose                                       Date: 02/29/2024  Value: 98          Ref range: 70 - 99 mg/dL      Status: Final  BUN                                           Date: 02/29/2024  Value: 11          Ref range: 8 - 23 mg/dL       Status: Final  Creatinine                                    Date: 02/29/2024  Value: 0.7         Ref range: 0.7 - 1.2 mg/dL    Status: Final  Est, Glom Filt Rate                           Date: 02/29/2024  Value: >60         Ref range: >60 mL/min/1.73m2  Status: Final                Comment:       These results are not intended for use in patients <18 years of age.        eGFR results are calculated without a race factor using the 2021 CKD-EPI equation.  Careful clinical correlation is recommended, particularly when comparing to results   calculated using previous equations.  The CKD-EPI equation is less accurate in patients with extremes of muscle mass, extra-renal   metabolism of creatine, excessive creatine ingestion, or following therapy that affects   renal tubular secretion.    Calcium                                       Date: 02/29/2024  Value: 9.0         Ref range: 8.6 - 10.4 mg/dL   Status: Final  Total Protein                                 Date: 02/29/2024  Value: 6.9         Ref range: 6.4 - 8.3 g/dL     Status: Final  Albumin                                       Date: 02/29/2024  Value: 3.8         Ref range: 3.5 - 5.2 g/dL     Status: Final  Albumin/Globulin Ratio                        Date: 02/29/2024  Value: 1.2         Ref range: 1.0 - 2.5          Status: Final  Total Bilirubin                               Date: 02/29/2024  Value: 0.4         Ref range: 0.3 - 1.2 mg/dL    Status: Final  Alkaline Phosphatase                          Date:

## 2024-02-29 NOTE — PROCEDURES
Procedure is pulmonary vein isolation using cryo source of energy, 3D mapping using EnSite, intracardiac echocardiography.    Preoperative diagnosis is symptomatic paroxysmal atrial fibrillation.    Postoperative diagnosis is the same.  significantly thick septum.    Procedure was done under general anesthesia.  Anesthesia was present for the duration of the case.    Informed consent was obtained.  Patient was brought to the EP lab in the postabsorptive state.  After intubation the right groin was prepared and draped in the usual manner.  The right groin was infiltrated with 2% Xylocaine.  Guided by ultrasonography the right femoral vein was captured 3 separate times.  3 separate guidewires were advanced.    Heparin bolus and drip was a started immediately.  ACT guided the dosing of IV heparin throughout the case.    Using the long 9 Congolese sheath we advanced intracardiac echo catheter to the right atrium.  We advanced it to the RV and no pericardial effusion was encountered.  Once we pulled out to the right atrium it was evident the quality of imaging was not adequate and the gentleman had very thick septum and very narrow fossa window.    6 Congolese sheath was advanced over one of the wire and a quadripolar catheter was advanced to the hiss position to help guide the transseptal approach and later that catheter was parked in the IVC till we need to pace the right phrenic nerve.    Over the third long wire we advanced a Bryan steerable sheath and introducers.  Transseptal approach was done with the Bryan system and wire using radiofrequency.  The system was advanced to the left atrium and we replaced it over the wire with the Omniox sheath and introducer.  Once in the left atrium the sheath was infiltrated and flushed in the usual technique.    Esophageal thermometer probe was advanced and guided by fluoroscopy to be close to the ablation site.    The first targeted vein was the left anterior pulmonary vein.   final application was at 30 seconds was -32 degrees legion temperature was -50 degrees duration 240 seconds time to 0 was 9 also the GL temperature was 34.9.  As we still have evidence of connection we mapped and we found a right middle pulmonary vein we Epley we applied lesions in 2 of its branches.  The first 1 at 30 seconds was -30 degrees legion temperature was -48 degrees legion time 240 seconds time to 0 was 10 esophageal temperature was 34.8.  Then we mapped a different branch of the right middle vein at 30 seconds temperature was -32 lesion temperature was -46 legion time was 240 seconds time to 0 was 10 possibly GI temperature was 33.7.    At that point all veins right and left were isolated completely.  We paced to confirm block in the inferior and middle veins.  The rest of the veins were isolated completely.    Heparin was stopped.  IV protamine was given.  Catheters were removed from the left atrium.  We downsized sheath as required.  We closed using Vascade venous closure devices.  Patient tolerated the procedure well and he is going to be intubated and discharged from the EP lab in stable condition.    Patient is known to have second-degree AV block for long time this is not new and patient and wife are aware of that.  Patient will be observed and discharged home later no complications encountered.

## 2024-02-29 NOTE — H&P
[facility]  Procedure History and Physical Update          Patient Name: Ritchie Garcia  MRN:    [id number]  YOB: 1960  Date of evaluation:  2/29/2024    Procedure:    Pulmonary vein isolation    Indication for procedure:  Paroxysmal atrial fibrillation      Please refer to the note completed by Dr. Faria on 2/14/2024 in the medical record and note that:    [x] I have examined the patient and reviewed the H&P/Consult and there are no changes to be made to the assessment or plan.    [] I have examined the patient and reviewed the H&P/Consult and have noted the following changes:    Past Medical History:   Diagnosis Date    Atrial flutter (HCC) 09/19/2023    Bunion     right    Diverticul disease small and large intestine, no perforati or abscess     GERD (gastroesophageal reflux disease)     Hammer toe     right    Heart attack (HCC) 11/09/2009    Hyperlipidemia     Hypertension     Pericarditis     Positive cardiac stress test 11/2015    Smoking history        Past Surgical History:   Procedure Laterality Date    CARDIAC CATHETERIZATION  02/25/2022    CARDIOVERSION N/A 09/19/2023    DR. EMANUEL    COLONOSCOPY  01/08/2018    diverticulosis,[id number]    CORONARY ANGIOPLASTY      stents x2    ENDOSCOPY, COLON, DIAGNOSTIC      EP STUDY/ABLATION      EP STUDY/ABLATION N/A 02/29/2024    A-FIB ABLATION      DR. MEDELLIN    FOOT SURGERY      JOINT REPLACEMENT Left     knee    JOINT REPLACEMENT Right 03/2022    KNEE ARTHROSCOPY  7-2006, 1-2014    TOTAL KNEE ARTHROPLASTY Left 02/26/2019    Left Total Knee Arthroplasty and injection of right knee performed by Theo Mota MD at [facility] OR    TOTAL KNEE ARTHROPLASTY Right 03/08/2022    Right Total Knee Arthroplasty performed by Theo Mota MD at [facility] OR    TRANSESOPHAGEAL ECHOCARDIOGRAM N/A 09/19/2023    DR. EMANUEL    TRANSESOPHAGEAL ECHOCARDIOGRAM  11/14/2023    UPPER GASTROINTESTINAL ENDOSCOPY         Family History   Problem Relation  well      Data:  Cath  2/25/2022 [facility]     1. Widely patent stents in D1 and Lcx   2. Known  of RCA with left to right Collaterals   3. Normal LV systolic function.      Echo  DIONNE 11/14/2023    Left Atrium: Normal sized appendage. No left atrial appendage thrombus noted.          9/19/2023  DIONNE     Left Ventricle: Normal left ventricular systolic function with a visually estimated EF of 50 - 55%. Left ventricle size is normal. Normal wall thickness. Global hypokinesis present.    Left Atrium: Normal sized appendage. Left atrial appendage thrombus noted.    Stress      Assessment:  Paroxysmal atrial fibrillation  Left atrial appendage thrombus resolved on echo 11/14/2023  Alcohol abuse  Second-degree AV block  Obesity  Renal function creatinine 0.7, GFR > 60      Plan:  Proceed with planned PVI procedure.  Further orders to follow.      Pre Procedure Conscious Sedation Data:    ASA Class:    [] I [x] II [] III [] IV    Mallampati Class:  [] I [x] II [] III [] IV    Risks, benefits, alternatives, and details discussed extensively. Accepts and consents.      Electronically signed on 02/29/24 at 11:13 AM by:    Aneudy Alexandre MD, MD   Fellow, Cardiovascular Diseases  [facility]

## 2024-02-29 NOTE — ANESTHESIA PRE PROCEDURE
involving native coronary artery of native heart without angina pectoris I25.10    Anxiety F41.9    Primary osteoarthritis of left knee M17.12    Osteoarthritis of left knee M17.12    Status post left knee replacement Z96.652    CAD in native artery I25.10    Status post right knee replacement Z96.651    Typical atrial flutter (HCC) I48.3    Thrombus of left atrial appendage I51.3    Encounter for monitoring anti-arrhythmic therapy Z51.81, Z79.899    Atrial fibrillation and flutter (HCC) I48.91, I48.92    Atrial flutter (HCC) I48.92       Past Medical History:        Diagnosis Date    Atrial flutter (HCC) 09/19/2023    Bunion     right    Diverticul disease small and large intestine, no perforati or abscess     GERD (gastroesophageal reflux disease)     Hammer toe     right    Heart attack (HCC) 11/09/2009    Hyperlipidemia     Hypertension     Pericarditis     Positive cardiac stress test 11/2015    Smoking history        Past Surgical History:        Procedure Laterality Date    CARDIAC CATHETERIZATION  02/25/2022    CARDIOVERSION N/A 09/19/2023    DR. EMANUEL    COLONOSCOPY  01/08/2018    diverticulosis,[id number]    CORONARY ANGIOPLASTY      stents x2    ENDOSCOPY, COLON, DIAGNOSTIC      EP STUDY/ABLATION      EP STUDY/ABLATION N/A 02/29/2024    A-FIB ABLATION      DR. MEDELLIN    FOOT SURGERY      JOINT REPLACEMENT Left     knee    JOINT REPLACEMENT Right 03/2022    KNEE ARTHROSCOPY  7-2006, 1-2014    TOTAL KNEE ARTHROPLASTY Left 02/26/2019    Left Total Knee Arthroplasty and injection of right knee performed by Theo Mota MD at [facility] OR    TOTAL KNEE ARTHROPLASTY Right 03/08/2022    Right Total Knee Arthroplasty performed by Theo Mota MD at [facility] OR    TRANSESOPHAGEAL ECHOCARDIOGRAM N/A 09/19/2023    DR. EMANUEL    TRANSESOPHAGEAL ECHOCARDIOGRAM  11/14/2023    UPPER GASTROINTESTINAL ENDOSCOPY         Social History:    Social History     Tobacco Use    Smoking status: Every Day     Current packs/day:

## 2024-03-01 LAB
EKG ATRIAL RATE: 93 BPM
EKG Q-T INTERVAL: 424 MS
EKG QRS DURATION: 86 MS
EKG QTC CALCULATION (BAZETT): 467 MS
EKG R AXIS: 21 DEGREES
EKG T AXIS: 50 DEGREES
EKG VENTRICULAR RATE: 73 BPM

## 2024-03-01 PROCEDURE — 93010 ELECTROCARDIOGRAM REPORT: CPT | Performed by: INTERNAL MEDICINE

## 2024-03-01 NOTE — DISCHARGE INSTRUCTIONS
Catheter ablation is a procedure that treats heart rhythm problems. These problems include atrial fibrillation, supraventricular tachycardia (SVT), atrial flutter, and ventricular tachycardia.  Your heart should have a strong, steady beat. That beat is controlled by the heart's electrical system. Sometimes that system misfires. This causes a heartbeat that is too fast and isn't steady.  Catheter ablation is a way to get into your heart and fix the problem. Ablation is not surgery.  How is catheter ablation done?  Your doctor inserts thin tubes called catheters into a blood vessel in your groin, arm, or neck. Then your doctor feeds them into the heart. Wires in the catheters help the doctor find the problem areas. Then he or she uses the wires to send energy to destroy the tiny areas of heart tissue that are causing the problems.  It may seem like a bad idea to destroy parts of your heart on purpose. But the areas that are destroyed are very tiny. They should not affect your heart's ability to do its job.  You may be awake during the procedure. Or you may be asleep. The doctor will give you medicines to help you feel relaxed and to numb the areas where the catheters go in. You may feel a little uncomfortable, but you should not feel pain.  This procedure usually takes 2 to 6 hours. In rare cases, it can take longer. You may stay overnight in the hospital. How long you stay in the hospital depends on the type of ablation you have.  What can you expect after catheter ablation?  Do not exercise hard or lift anything heavy for a week. You will probably be able to go back to work and to your normal routine in 1 or 2 days.  You may have swelling, bruising, or a small lump around the site where the catheters went into your body. These should go away in 3 to 4 weeks.  You may have to take some medicines for a while.  Follow-up care is a key part of your treatment and safety. Be sure to make and go to all appointments,  to site for one week  Do not soak in a pool or tub and do not swim for one week.     THROUGHOUT THE NEXT 4 DAYS CHECK YOUR GROIN SITE MAKING SURE THE SITE REMAINS SOFT CLEAN AND DRY.  BRUISING IS OK AS LONG AS IT REMAINS SOFT  If there is any bleeding at the catheter site, apply firm pressure with your hands until the bleeding stops. If bleeding continues after 3 minutes call 911.   If there is any swelling or firm areas at your puncture site, this could be bleeding under the skin(hematoma), and if you have any concerns seek help immediately. If unsure it is swollen lie flat and compare both right and left groin sites.         Drink plenty of fluids after the test. This will flush the x-ray dye from your system.   Return to your normal diet.       The sedative will make you sleepy. Rest until the effects have worn off.   Ask your doctor when you will be able to return to work.   Avoid heavy lifting objects greater than 8 pounds which is about a full gallon of milk, physically demanding activities, and sexual activity for 5-7 days.   Your activity will also depend upon where the catheter was inserted: Do not sit for long periods of time. Try to change positions frequently.   Medications   If advised by your doctor, resume taking your normal medicines. Use acetaminophen (Tylenol) for pain relief.   Do not take metformin (Glucophage) or glyburide and metformin (Glucovance) for 48 hours after the test.     If you had to stop taking these medications before the procedure, ask your doctor when you can resume taking them:   If youAnti-inflammatory drugs (eg, ibuprofen )   Blood thinners, such as warfarin (Coumadin)   Clopidogrel (Plavix)   If you are taking medicines, follow these general guidelines:   Take your medicine as directed. Do not change the amount or the schedule.   Do not stop taking them without talking to your doctor.   Do not share them.   Know what the results and side effects. Report them to your doctor.

## 2024-03-01 NOTE — ANESTHESIA POSTPROCEDURE EVALUATION
Department of Anesthesiology  Postprocedure Note    Patient: Ritchie Garcia  MRN: [id number]  YOB: 1960  Date of evaluation: 2/29/2024    Procedure Summary       Date: 02/29/24 Room / Location: [facility] EP LAB RM 1 / [facility] CARDIAC CATH LAB    Anesthesia Start: 1308 Anesthesia Stop: 1651    Procedure: El-Atassi / Ablation A-fib w/anes / cryo, carly, ice Diagnosis:       Paroxysmal atrial fibrillation (HCC)      (same)    Providers: Lemuel Garrett MD Responsible Provider: Dillan Tarango MD    Anesthesia Type: general ASA Status: 3            Anesthesia Type: No value filed.    Alethea Phase I: Alethea Score: 9    Alethea Phase II:    POST-OP ANESTHESIA NOTE       BP (!) 140/76   Pulse 64   Temp (!) 96.1 °F (35.6 °C)   Resp 16   SpO2 92%       Pain Level: 7     T  Anesthesia Post Evaluation    Patient location during evaluation: PACU  Patient participation: complete - patient participated  Level of consciousness: awake  Pain score: 7  Airway patency: patent  Nausea & Vomiting: no vomiting and no nausea  Cardiovascular status: hemodynamically stable  Respiratory status: acceptable  Hydration status: stable  Pain management: adequate        There were no known notable events for this encounter.

## 2024-03-01 NOTE — PROGRESS NOTES
All discharge instructions reviewed, questions answered, paper signed and given copy. Patient discharged per wheelchair with wife and belongings.

## 2024-03-27 ENCOUNTER — OFFICE VISIT (OUTPATIENT)
Age: 64
End: 2024-03-27
Payer: COMMERCIAL

## 2024-03-27 VITALS
HEART RATE: 76 BPM | SYSTOLIC BLOOD PRESSURE: 131 MMHG | BODY MASS INDEX: 38.37 KG/M2 | WEIGHT: 268 LBS | HEIGHT: 70 IN | OXYGEN SATURATION: 92 % | DIASTOLIC BLOOD PRESSURE: 83 MMHG | TEMPERATURE: 98.1 F

## 2024-03-27 DIAGNOSIS — I48.0 PAROXYSMAL ATRIAL FIBRILLATION (HCC): Primary | ICD-10-CM

## 2024-03-27 DIAGNOSIS — I44.1 2ND DEGREE AV BLOCK: ICD-10-CM

## 2024-03-27 DIAGNOSIS — E66.01 CLASS 2 SEVERE OBESITY DUE TO EXCESS CALORIES WITH SERIOUS COMORBIDITY IN ADULT, UNSPECIFIED BMI (HCC): ICD-10-CM

## 2024-03-27 PROCEDURE — 99213 OFFICE O/P EST LOW 20 MIN: CPT | Performed by: SPECIALIST

## 2024-03-27 PROCEDURE — 3079F DIAST BP 80-89 MM HG: CPT | Performed by: SPECIALIST

## 2024-03-27 PROCEDURE — 3075F SYST BP GE 130 - 139MM HG: CPT | Performed by: SPECIALIST

## 2024-03-27 NOTE — PROGRESS NOTES
[facility] CARDIAC ELECTROPHYSIOLOGY  [address], [address]  [facility]  [address]    Date of Visit:  3/27/2024  Patient Name: Ritchie Garcia   Patient :  1960   Referring By:  No ref. provider found     CHIEF COMPLAINT/HPI:     Ritchie Garcia is a 63 y.o. male who presents today for an general visit to be evaluated for the following condition(s):  Chief Complaint   Patient presents with    Post-Op Check     S/p Ablation     Patient had PVI done about months ago and it went uncomplicated.  He cannot tell of a significant difference.  No complaints to suggest complication.  He complains about the right groin irritated for a few days after the procedure.  He cannot tell if he had significant change in the atrial fibrillation pattern.  This is a gentleman that also is known to have second-degree AV block.    He continues to take his Tikosyn and oral anticoagulants.  His blood pressure sometimes is out of control.  We advised him to use his blood pressure machine at home.    He had symptoms of obstructive sleep apnea and having issues with his CPAP machine I asked him to call his provider for that situation.  REVIEW OF SYSTEM      Review of Systems noncontributory.    REVIEWED INFORMATION      Allergies   Allergen Reactions    Ciprofloxacin Itching and Rash       Current Outpatient Medications   Medication Sig Dispense Refill    apixaban (ELIQUIS) 5 MG TABS tablet Take 1 tablet by mouth 2 times daily 180 tablet 3    dofetilide (TIKOSYN) 250 MCG capsule Take 1 capsule by mouth 2 times daily 60 capsule 3    atorvastatin (LIPITOR) 40 MG tablet Take 1 tablet by mouth nightly 90 tablet 3    lisinopril (PRINIVIL;ZESTRIL) 20 MG tablet Take 1 tablet by mouth once daily 90 tablet 1    tamsulosin (FLOMAX) 0.4 MG capsule Take 1 capsule by mouth once daily 90 capsule 1    Handicap Placard MISC by Does not apply route Expires: 3/18/2027 1 each 0    esomeprazole (NEXIUM) 20 MG delayed release capsule Take 1 capsule by

## 2024-04-05 ENCOUNTER — TELEPHONE (OUTPATIENT)
Dept: SURGERY | Age: 64
End: 2024-04-05

## 2024-04-11 ENCOUNTER — TELEPHONE (OUTPATIENT)
Dept: VASCULAR SURGERY | Age: 64
End: 2024-04-11

## 2024-04-11 NOTE — TELEPHONE ENCOUNTER
Marivel from [facility] called - insurance has denied the LOOP recorder procedure for patient.  I called patient and let him know it is canceled and that I will follow up with Dr. Faria when he gets back from being gone.

## 2024-05-07 ENCOUNTER — TELEPHONE (OUTPATIENT)
Dept: VASCULAR SURGERY | Age: 64
End: 2024-05-07

## 2024-05-24 DIAGNOSIS — R39.14 BENIGN PROSTATIC HYPERPLASIA WITH INCOMPLETE BLADDER EMPTYING: ICD-10-CM

## 2024-05-24 DIAGNOSIS — N40.1 BENIGN PROSTATIC HYPERPLASIA WITH INCOMPLETE BLADDER EMPTYING: ICD-10-CM

## 2024-05-24 RX ORDER — TAMSULOSIN HYDROCHLORIDE 0.4 MG/1
0.4 CAPSULE ORAL DAILY
Qty: 90 CAPSULE | Refills: 0 | OUTPATIENT
Start: 2024-05-24

## 2024-06-11 ENCOUNTER — TELEPHONE (OUTPATIENT)
Age: 64
End: 2024-06-11

## 2024-06-26 ENCOUNTER — OFFICE VISIT (OUTPATIENT)
Dept: CARDIOLOGY | Age: 64
End: 2024-06-26
Payer: COMMERCIAL

## 2024-06-26 VITALS
DIASTOLIC BLOOD PRESSURE: 64 MMHG | SYSTOLIC BLOOD PRESSURE: 139 MMHG | HEIGHT: 70 IN | WEIGHT: 268 LBS | BODY MASS INDEX: 38.37 KG/M2 | HEART RATE: 77 BPM

## 2024-06-26 DIAGNOSIS — I48.91 ATRIAL FIBRILLATION, UNSPECIFIED TYPE (HCC): Primary | ICD-10-CM

## 2024-06-26 DIAGNOSIS — Z86.79 S/P ABLATION OF ATRIAL FIBRILLATION: ICD-10-CM

## 2024-06-26 DIAGNOSIS — I25.10 CAD IN NATIVE ARTERY: ICD-10-CM

## 2024-06-26 DIAGNOSIS — I48.3 TYPICAL ATRIAL FLUTTER (HCC): ICD-10-CM

## 2024-06-26 DIAGNOSIS — I48.91 ATRIAL FIBRILLATION AND FLUTTER (HCC): ICD-10-CM

## 2024-06-26 DIAGNOSIS — Z98.890 S/P CARDIAC CATH: ICD-10-CM

## 2024-06-26 DIAGNOSIS — Z98.890 S/P ABLATION OF ATRIAL FIBRILLATION: ICD-10-CM

## 2024-06-26 DIAGNOSIS — I10 PRIMARY HYPERTENSION: ICD-10-CM

## 2024-06-26 DIAGNOSIS — I48.92 ATRIAL FIBRILLATION AND FLUTTER (HCC): ICD-10-CM

## 2024-06-26 PROCEDURE — 93000 ELECTROCARDIOGRAM COMPLETE: CPT | Performed by: INTERNAL MEDICINE

## 2024-06-26 PROCEDURE — 99214 OFFICE O/P EST MOD 30 MIN: CPT | Performed by: INTERNAL MEDICINE

## 2024-06-26 PROCEDURE — 3078F DIAST BP <80 MM HG: CPT | Performed by: INTERNAL MEDICINE

## 2024-06-26 PROCEDURE — 3075F SYST BP GE 130 - 139MM HG: CPT | Performed by: INTERNAL MEDICINE

## 2024-06-26 NOTE — PROGRESS NOTES
Cardiology Consultation  [facility]      06/26/24      CC & HPI:  Ritchie Garcia  is doing well from a cardiac standpoint. Good functional capacity with no significant change in functional capacity. No chest pain, no dyspnea, no PND, no syncope or pre-syncope, no orthopnea. No symptoms of CHF or angina/chest pain.    Past Medical History:   Diagnosis Date    Atrial flutter (HCC) 09/19/2023    Bunion     right    Diverticul disease small and large intestine, no perforati or abscess     GERD (gastroesophageal reflux disease)     Hammer toe     right    Heart attack (HCC) 11/09/2009    Hyperlipidemia     Hypertension     Pericarditis     Positive cardiac stress test 11/2015    Smoking history        Past Surgical History:   Procedure Laterality Date    CARDIAC CATHETERIZATION  02/25/2022    CARDIOVERSION N/A 09/19/2023    DR. EMANUEL    COLONOSCOPY  01/08/2018    diverticulosis,[id number]    CORONARY ANGIOPLASTY      stents x2    ENDOSCOPY, COLON, DIAGNOSTIC      EP DEVICE PROCEDURE N/A 2/29/2024    Leela / Ablation A-fib w/anes / cryo, carly, ice performed by Elvis Medellin MD at [facility] CARDIAC CATH LAB    EP STUDY/ABLATION      EP STUDY/ABLATION N/A 02/29/2024    A-FIB ABLATION      DR. MEDELLIN    FOOT SURGERY      JOINT REPLACEMENT Left     knee    JOINT REPLACEMENT Right 03/2022    KNEE ARTHROSCOPY  7-2006, 1-2014    TOTAL KNEE ARTHROPLASTY Left 02/26/2019    Left Total Knee Arthroplasty and injection of right knee performed by Theo Mota MD at [facility] OR    TOTAL KNEE ARTHROPLASTY Right 03/08/2022    Right Total Knee Arthroplasty performed by Theo Mota MD at [facility] OR    TRANSESOPHAGEAL ECHOCARDIOGRAM N/A 09/19/2023    DR. EMANUEL    TRANSESOPHAGEAL ECHOCARDIOGRAM  11/14/2023    UPPER GASTROINTESTINAL ENDOSCOPY         Family History   Problem Relation Age of Onset    Diabetes Mother     Early Death Brother 17        MVA    Early Death Sister 51        blood clot    Heart Disease Other

## 2024-07-08 ENCOUNTER — TELEPHONE (OUTPATIENT)
Dept: CARDIOLOGY | Age: 64
End: 2024-07-08

## 2024-07-08 NOTE — TELEPHONE ENCOUNTER
[facility] calling to ask about the possibly being. Dr. Nai Morfin at [facility]    Please advisop    [phone]--Milka

## 2024-07-08 NOTE — TELEPHONE ENCOUNTER
Pt called to ask if we had recent lab to show he is diabetic. Pt has not been told he is diabetic in the past and is wondering if he really needs the medication. Pt is also checking with [place] to have some lab result sent to the office.    [phone]    Last Appt:  6/26/2024  Next Appt:   1/22/2025

## 2024-07-08 NOTE — TELEPHONE ENCOUNTER
Patient informed and is confused; he states his PCP said his lab work was normal. Write encouraged patient to talke to PCP and develop  a plan

## 2024-09-23 RX ORDER — DOFETILIDE 0.25 MG/1
250 CAPSULE ORAL 2 TIMES DAILY
Qty: 180 CAPSULE | Refills: 3 | Status: SHIPPED | OUTPATIENT
Start: 2024-09-23

## 2025-01-22 ENCOUNTER — OFFICE VISIT (OUTPATIENT)
Dept: CARDIOLOGY | Age: 65
End: 2025-01-22
Payer: COMMERCIAL

## 2025-01-22 VITALS
DIASTOLIC BLOOD PRESSURE: 82 MMHG | HEIGHT: 70 IN | BODY MASS INDEX: 39.8 KG/M2 | HEART RATE: 86 BPM | WEIGHT: 278 LBS | SYSTOLIC BLOOD PRESSURE: 150 MMHG

## 2025-01-22 DIAGNOSIS — N52.9 ERECTILE DISORDER: ICD-10-CM

## 2025-01-22 DIAGNOSIS — I44.1 2ND DEGREE AV BLOCK: ICD-10-CM

## 2025-01-22 DIAGNOSIS — I50.22 CHRONIC SYSTOLIC (CONGESTIVE) HEART FAILURE (HCC): ICD-10-CM

## 2025-01-22 DIAGNOSIS — Z86.79 S/P ABLATION OF ATRIAL FIBRILLATION: ICD-10-CM

## 2025-01-22 DIAGNOSIS — I48.3 TYPICAL ATRIAL FLUTTER (HCC): ICD-10-CM

## 2025-01-22 DIAGNOSIS — I48.91 ATRIAL FIBRILLATION, UNSPECIFIED TYPE (HCC): Primary | ICD-10-CM

## 2025-01-22 DIAGNOSIS — Z98.890 S/P ABLATION OF ATRIAL FIBRILLATION: ICD-10-CM

## 2025-01-22 DIAGNOSIS — I25.10 CAD IN NATIVE ARTERY: ICD-10-CM

## 2025-01-22 PROCEDURE — 93000 ELECTROCARDIOGRAM COMPLETE: CPT | Performed by: NURSE PRACTITIONER

## 2025-01-22 PROCEDURE — 3079F DIAST BP 80-89 MM HG: CPT | Performed by: NURSE PRACTITIONER

## 2025-01-22 PROCEDURE — 3077F SYST BP >= 140 MM HG: CPT | Performed by: NURSE PRACTITIONER

## 2025-01-22 PROCEDURE — 99214 OFFICE O/P EST MOD 30 MIN: CPT | Performed by: NURSE PRACTITIONER

## 2025-01-22 RX ORDER — ACETAMINOPHEN 500 MG
1000 TABLET ORAL DAILY
COMMUNITY

## 2025-01-22 NOTE — PROGRESS NOTES
Cardiology Follow up  [facility]      01/22/25      CC & HPI:  Ritchie Garcia Seen and examined in the office.  Pt denies any CP or but states that he is having more sob.  He is concerned about his weight and states that he thinks that it is related to his weight.  He is also having issues with ED and has tried PRN medications with no issues.   EKG, medications and vitals reviewed.       Past Medical History:   Diagnosis Date    Atrial flutter (HCC) 09/19/2023    Bunion     right    Diverticul disease small and large intestine, no perforati or abscess     GERD (gastroesophageal reflux disease)     Hammer toe     right    Heart attack (HCC) 11/09/2009    Hyperlipidemia     Hypertension     Pericarditis     Positive cardiac stress test 11/2015    Smoking history        Past Surgical History:   Procedure Laterality Date    CARDIAC CATHETERIZATION  02/25/2022    CARDIOVERSION N/A 09/19/2023    DR. EMANUEL    COLONOSCOPY  01/08/2018    diverticulosis,[id number]    CORONARY ANGIOPLASTY      stents x2    ENDOSCOPY, COLON, DIAGNOSTIC      EP DEVICE PROCEDURE N/A 2/29/2024    Leela / Ablation A-fib w/anes / cryo, carly, ice performed by Elvis Medellin MD at [facility] CARDIAC CATH LAB    EP STUDY/ABLATION      EP STUDY/ABLATION N/A 02/29/2024    A-FIB ABLATION      DR. MEDELLIN    FOOT SURGERY      JOINT REPLACEMENT Left     knee    JOINT REPLACEMENT Right 03/2022    KNEE ARTHROSCOPY  7-2006, 1-2014    TOTAL KNEE ARTHROPLASTY Left 02/26/2019    Left Total Knee Arthroplasty and injection of right knee performed by Theo Mota MD at [facility] OR    TOTAL KNEE ARTHROPLASTY Right 03/08/2022    Right Total Knee Arthroplasty performed by Theo Mota MD at [facility] OR    TRANSESOPHAGEAL ECHOCARDIOGRAM N/A 09/19/2023    DR. EMANUEL    TRANSESOPHAGEAL ECHOCARDIOGRAM  11/14/2023    UPPER GASTROINTESTINAL ENDOSCOPY         Family History   Problem Relation Age of Onset    Diabetes Mother     Early Death Brother 17

## 2025-02-24 ENCOUNTER — OFFICE VISIT (OUTPATIENT)
Dept: UROLOGY | Age: 65
End: 2025-02-24
Payer: COMMERCIAL

## 2025-02-24 VITALS
DIASTOLIC BLOOD PRESSURE: 68 MMHG | HEIGHT: 70 IN | HEART RATE: 88 BPM | SYSTOLIC BLOOD PRESSURE: 136 MMHG | BODY MASS INDEX: 39.89 KG/M2

## 2025-02-24 DIAGNOSIS — N52.9 ERECTILE DYSFUNCTION, UNSPECIFIED ERECTILE DYSFUNCTION TYPE: ICD-10-CM

## 2025-02-24 DIAGNOSIS — N40.1 BENIGN LOCALIZED PROSTATIC HYPERPLASIA WITH LOWER URINARY TRACT SYMPTOMS (LUTS): Primary | ICD-10-CM

## 2025-02-24 PROCEDURE — 3075F SYST BP GE 130 - 139MM HG: CPT | Performed by: UROLOGY

## 2025-02-24 PROCEDURE — 3078F DIAST BP <80 MM HG: CPT | Performed by: UROLOGY

## 2025-02-24 PROCEDURE — 99204 OFFICE O/P NEW MOD 45 MIN: CPT | Performed by: UROLOGY

## 2025-02-24 RX ORDER — TADALAFIL 10 MG/1
10 TABLET ORAL PRN
Qty: 30 TABLET | Refills: 11 | Status: SHIPPED | OUTPATIENT
Start: 2025-02-24

## 2025-02-24 NOTE — TELEPHONE ENCOUNTER
Patient needing semaglutide sent to [facility] rather than [facility]. Please sign and send if agreeable.  Thank you.

## 2025-02-24 NOTE — PROGRESS NOTES
mg.     ED- sildenafil was not helpful in past. Will try cialis 10 mg    1-2 months with sergio for ED check/psa and UA      Prescriptions Ordered:  No orders of the defined types were placed in this encounter.     Orders Placed:  No orders of the defined types were placed in this encounter.           KAIT RAMIREZ MD

## 2025-04-07 RX ORDER — APIXABAN 5 MG/1
5 TABLET, FILM COATED ORAL 2 TIMES DAILY
Qty: 180 TABLET | Refills: 3 | Status: SHIPPED | OUTPATIENT
Start: 2025-04-07

## 2025-04-11 ENCOUNTER — HOSPITAL ENCOUNTER (OUTPATIENT)
Age: 65
Discharge: HOME | End: 2025-04-11
Payer: COMMERCIAL

## 2025-04-11 DIAGNOSIS — N40.1 BENIGN LOCALIZED PROSTATIC HYPERPLASIA WITH LOWER URINARY TRACT SYMPTOMS (LUTS): ICD-10-CM

## 2025-04-11 LAB
BACTERIA URNS QL MICRO: ABNORMAL
EPI CELLS #/AREA URNS HPF: ABNORMAL /HPF (ref 0–5)
PSA SERPL-MCNC: 2.92 NG/ML (ref 0–4)
RBC #/AREA URNS HPF: ABNORMAL /HPF (ref 0–4)
WBC #/AREA URNS HPF: ABNORMAL /HPF (ref 0–4)

## 2025-04-11 PROCEDURE — 81015 MICROSCOPIC EXAM OF URINE: CPT

## 2025-04-11 PROCEDURE — 36415 COLL VENOUS BLD VENIPUNCTURE: CPT

## 2025-04-11 PROCEDURE — 84153 ASSAY OF PSA TOTAL: CPT

## 2025-04-17 ENCOUNTER — OFFICE VISIT (OUTPATIENT)
Dept: UROLOGY | Age: 65
End: 2025-04-17
Payer: COMMERCIAL

## 2025-04-17 VITALS
HEIGHT: 70 IN | SYSTOLIC BLOOD PRESSURE: 148 MMHG | HEART RATE: 90 BPM | DIASTOLIC BLOOD PRESSURE: 80 MMHG | BODY MASS INDEX: 39.89 KG/M2

## 2025-04-17 DIAGNOSIS — N40.1 BENIGN LOCALIZED PROSTATIC HYPERPLASIA WITH LOWER URINARY TRACT SYMPTOMS (LUTS): Primary | ICD-10-CM

## 2025-04-17 DIAGNOSIS — N52.9 ERECTILE DYSFUNCTION, UNSPECIFIED ERECTILE DYSFUNCTION TYPE: ICD-10-CM

## 2025-04-17 PROCEDURE — 99213 OFFICE O/P EST LOW 20 MIN: CPT

## 2025-04-17 PROCEDURE — 3077F SYST BP >= 140 MM HG: CPT

## 2025-04-17 PROCEDURE — 3079F DIAST BP 80-89 MM HG: CPT

## 2025-04-17 NOTE — PROGRESS NOTES
DR. MEDELLIN    FOOT SURGERY      JOINT REPLACEMENT Left     knee    JOINT REPLACEMENT Right 03/2022    KNEE ARTHROSCOPY  7-2006, 1-2014    TOTAL KNEE ARTHROPLASTY Left 02/26/2019    Left Total Knee Arthroplasty and injection of right knee performed by Theo Mota MD at [facility] OR    TOTAL KNEE ARTHROPLASTY Right 03/08/2022    Right Total Knee Arthroplasty performed by Theo Mota MD at [facility] OR    TRANSESOPHAGEAL ECHOCARDIOGRAM N/A 09/19/2023    DR. EMANUEL    TRANSESOPHAGEAL ECHOCARDIOGRAM  11/14/2023    UPPER GASTROINTESTINAL ENDOSCOPY       Family History   Problem Relation Age of Onset    Diabetes Mother     Early Death Brother 17        MVA    Early Death Sister 51        blood clot    Heart Disease Other      Outpatient Medications Marked as Taking for the 4/17/25 encounter (Office Visit) with Quinton Middleton PA-C   Medication Sig Dispense Refill    ELIQUIS 5 MG TABS tablet Take 1 tablet by mouth twice daily 180 tablet 3    Semaglutide,0.25 or 0.5MG/DOS, 2 MG/3ML SOPN Inject 0.25 mg into the skin every 7 days 3 mL 0    tadalafil (CIALIS) 10 MG tablet Take 1 tablet by mouth as needed for Erectile Dysfunction 30 tablet 11    acetaminophen (TYLENOL) 500 MG tablet Take 2 tablets by mouth daily      dofetilide (TIKOSYN) 250 MCG capsule Take 1 capsule by mouth twice daily 180 capsule 3    atorvastatin (LIPITOR) 40 MG tablet Take 1 tablet by mouth nightly 90 tablet 3    lisinopril (PRINIVIL;ZESTRIL) 20 MG tablet Take 1 tablet by mouth once daily 90 tablet 1    tamsulosin (FLOMAX) 0.4 MG capsule Take 1 capsule by mouth once daily 90 capsule 1    Handicap Placard MISC by Does not apply route Expires: 3/18/2027 1 each 0    esomeprazole (NEXIUM) 20 MG delayed release capsule Take 1 capsule by mouth daily      calcium carbonate 600 MG TABS tablet Take 1 tablet by mouth daily         Ciprofloxacin  Social History     Tobacco Use   Smoking Status Every Day    Current packs/day: 0.40    Average packs/day: 0.4 packs/day for

## 2025-07-30 ENCOUNTER — OFFICE VISIT (OUTPATIENT)
Dept: CARDIOLOGY | Age: 65
End: 2025-07-30
Payer: COMMERCIAL

## 2025-07-30 VITALS
HEART RATE: 86 BPM | WEIGHT: 264.4 LBS | BODY MASS INDEX: 37.85 KG/M2 | HEIGHT: 70 IN | OXYGEN SATURATION: 96 % | DIASTOLIC BLOOD PRESSURE: 78 MMHG | SYSTOLIC BLOOD PRESSURE: 142 MMHG

## 2025-07-30 DIAGNOSIS — Z95.5 S/P CORONARY ARTERY STENT PLACEMENT: ICD-10-CM

## 2025-07-30 DIAGNOSIS — I48.91 ATRIAL FIBRILLATION, UNSPECIFIED TYPE (HCC): Primary | ICD-10-CM

## 2025-07-30 DIAGNOSIS — E78.5 DYSLIPIDEMIA: ICD-10-CM

## 2025-07-30 DIAGNOSIS — I25.10 CAD IN NATIVE ARTERY: ICD-10-CM

## 2025-07-30 DIAGNOSIS — I10 ESSENTIAL HYPERTENSION: ICD-10-CM

## 2025-07-30 PROCEDURE — 3077F SYST BP >= 140 MM HG: CPT | Performed by: INTERNAL MEDICINE

## 2025-07-30 PROCEDURE — 99214 OFFICE O/P EST MOD 30 MIN: CPT | Performed by: INTERNAL MEDICINE

## 2025-07-30 PROCEDURE — 93000 ELECTROCARDIOGRAM COMPLETE: CPT | Performed by: INTERNAL MEDICINE

## 2025-07-30 PROCEDURE — 3078F DIAST BP <80 MM HG: CPT | Performed by: INTERNAL MEDICINE

## 2025-07-30 NOTE — PROGRESS NOTES
Cardiology Follow up  [facility]      07/30/25      CC & HPI:  Ritchie Garcia Seen and examined in the office for follow-up on CAD, PAF with prior ablation..      Patient is doing quite well from cardiac standpoint.  He denies anginal type chest pain.  No significant dyspnea on exertion orthopnea or PND.  Denies any palpitations dizziness or syncope.    Past Medical History:   Diagnosis Date    Atrial flutter (HCC) 09/19/2023    Bunion     right    Diverticul disease small and large intestine, no perforati or abscess     GERD (gastroesophageal reflux disease)     Hammer toe     right    Heart attack (HCC) 11/09/2009    Hyperlipidemia     Hypertension     Pericarditis     Positive cardiac stress test 11/2015    Smoking history        Past Surgical History:   Procedure Laterality Date    CARDIAC CATHETERIZATION  02/25/2022    CARDIOVERSION N/A 09/19/2023    DR. EMANUEL    COLONOSCOPY  01/08/2018    diverticulosis,[id number]    CORONARY ANGIOPLASTY      stents x2    ENDOSCOPY, COLON, DIAGNOSTIC      EP DEVICE PROCEDURE N/A 2/29/2024    Leela / Ablation A-fib w/anes / cryo, carly, ice performed by Elvis Medellin MD at [facility] CARDIAC CATH LAB    EP STUDY/ABLATION      EP STUDY/ABLATION N/A 02/29/2024    A-FIB ABLATION      DR. MEDELLIN    FOOT SURGERY      JOINT REPLACEMENT Left     knee    JOINT REPLACEMENT Right 03/2022    KNEE ARTHROSCOPY  7-2006, 1-2014    TOTAL KNEE ARTHROPLASTY Left 02/26/2019    Left Total Knee Arthroplasty and injection of right knee performed by Theo Mota MD at [facility] OR    TOTAL KNEE ARTHROPLASTY Right 03/08/2022    Right Total Knee Arthroplasty performed by Theo Mota MD at [facility] OR    TRANSESOPHAGEAL ECHOCARDIOGRAM N/A 09/19/2023    DR. EMANUEL    TRANSESOPHAGEAL ECHOCARDIOGRAM  11/14/2023    UPPER GASTROINTESTINAL ENDOSCOPY         Family History   Problem Relation Age of Onset    Diabetes Mother     Early Death Brother 17        MVA    Early Death Sister 51

## (undated) DEVICE — 3000CC GUARDIAN II: Brand: GUARDIAN

## (undated) DEVICE — SKIN AFFIX SURG ADHESIVE 72/CS 0.55ML: Brand: MEDLINE

## (undated) DEVICE — Device

## (undated) DEVICE — COPILOT BLEEDBACK CONTROL VALVE: Brand: COPILOT

## (undated) DEVICE — HIGH FLOW TIP

## (undated) DEVICE — DUAL CUT SAGITTAL BLADE

## (undated) DEVICE — GLOVE ORANGE PI 8 1/2   MSG9085

## (undated) DEVICE — 4-PORT MANIFOLD: Brand: NEPTUNE 2

## (undated) DEVICE — CABLE ABLAT UMB COAX

## (undated) DEVICE — APPLICATOR GRN CHLORAPREP 2% CHG 70

## (undated) DEVICE — SYSTEM SKIN CLSR 22CM DERMBND PRINEO

## (undated) DEVICE — DRAPE,ORTHOMAX ,HIP,W/POUCHES: Brand: MEDLINE

## (undated) DEVICE — GAUZE,SPONGE,4"X4",16PLY,XRAY,STRL,LF: Brand: MEDLINE

## (undated) DEVICE — SYRINGE, LUER LOCK, 10ML: Brand: MEDLINE

## (undated) DEVICE — BIT DRL L3IN OD1/8IN MOD HEX QUIK CONN

## (undated) DEVICE — CABLE ELECTR UMB CATH

## (undated) DEVICE — 450 ML BOTTLE OF 0.05% CHLORHEXIDINE GLUCONATE IN 99.95% STERILE WATER FOR IRRIGATION, USP AND APPLICATOR.: Brand: IRRISEPT ANTIMICROBIAL WOUND LAVAGE

## (undated) DEVICE — TUBING PRSS MON L12IN PVC RIG NONEXPANDING M TO FEM CONN

## (undated) DEVICE — BANDAGE COMPR W6INXL5YD WHT BGE POLY COT M E WRP WV HK AND

## (undated) DEVICE — BANDAGE COBAN 4 IN COMPR W4INXL5YD FOAM COHESIVE QUIK STK SELF ADH SFT

## (undated) DEVICE — INTRODUCER SHTH AD L11CM DIA 9FR STD BLK S STL PERIPH BRACH

## (undated) DEVICE — GAUZE,SPONGE,4"X4",12PLY,STERILE,LF,2'S: Brand: MEDLINE

## (undated) DEVICE — DILATOR SET: Brand: COOK

## (undated) DEVICE — 9165 UNIVERSAL PATIENT PLATE: Brand: 3M™

## (undated) DEVICE — BLADE CLIPPER GEN PURP NS

## (undated) DEVICE — PAD,ABDOMINAL,5"X9",ST,LF,25/BX: Brand: MEDLINE INDUSTRIES, INC.

## (undated) DEVICE — SHEATH CATH 12FR L65CM INTVASC VLV STEER RADPQ MRK FLEXCATH

## (undated) DEVICE — CATHETER MAP 20 MM ACHVE

## (undated) DEVICE — HANDPIECE SET WITH SUCTION TUBING: Brand: INTERPULSE

## (undated) DEVICE — PACK SURG PROC REMINDER N WVN DISPOSABLE BEAC TIME OUT

## (undated) DEVICE — GOWN,AURORA,NON-REINFORCED,2XL: Brand: MEDLINE

## (undated) DEVICE — CATHETER EP 6FR L92CM 2-5-2MM SPC TIP 1MM 4 ELECTRD D CRV

## (undated) DEVICE — GLOVE ORANGE PI 7   MSG9070

## (undated) DEVICE — SUTURE VCRL SZ 1 L36IN ABSRB UD CTX L48MM 1/2 CIR J977H

## (undated) DEVICE — LINER GLOVEXL RED CUT RESIST STRL REPEL LT

## (undated) DEVICE — CATHETER CRYOABLATION 28 MM ARCTIC FRONT ADVANCED

## (undated) DEVICE — SUTURE STRATAFIX SPRL SZ 1 L14IN ABSRB VLT L48CM CTX 1/2 SXPD2B405

## (undated) DEVICE — SUTURE STRATAFIX SPRL SZ 2-0 L9IN ABSRB VLT MH L36MM 1/2 SXPD2B408

## (undated) DEVICE — TOWEL,OR,DSP,ST,BLUE,STD,4/PK,20PK/CS: Brand: MEDLINE

## (undated) DEVICE — 3M™ WARMING BLANKET, UPPER BODY, 10 PER CASE, 42268: Brand: BAIR HUGGER™

## (undated) DEVICE — NEEDLE SPNL L3.5IN PNK HUB S STL REG WALL FIT STYL W/ QNCKE

## (undated) DEVICE — INTENDED FOR TISSUE SEPARATION, AND OTHER PROCEDURES THAT REQUIRE A SHARP SURGICAL BLADE TO PUNCTURE OR CUT.: Brand: BARD-PARKER ® CARBON RIB-BACK BLADES

## (undated) DEVICE — ZIMMER® STERILE DISPOSABLE TOURNIQUET CUFF WITH PLC, DUAL PORT, SINGLE BLADDER, 24 IN. (61 CM)

## (undated) DEVICE — WEBRIL ST 6IN  20/CS

## (undated) DEVICE — GLOVE SURG SZ 85 L12IN FNGR THK13MIL WHT ISOLEX POLYISOPRENE

## (undated) DEVICE — INTRO SAFESHEATH 9FR 25CM W/SIDEPORT

## (undated) DEVICE — ZIMMER® STERILE DISPOSABLE TOURNIQUET CUFF WITH PLC, DUAL PORT, SINGLE BLADDER, 34 IN. (86 CM)

## (undated) DEVICE — CEMENT MIXING SYSTEM WITH FEMORAL BREAKWAY NOZZLE: Brand: REVOLUTION

## (undated) DEVICE — SOLUTION IRRIG 3000ML 0.9% SOD CHL USP UROMATIC PLAS CONT

## (undated) DEVICE — Z INACTIVE USE 2660664 SOLUTION IRRIG 3000ML 0.9% SOD CHL USP UROMATIC PLAS CONT

## (undated) DEVICE — SYRINGE, LUER LOCK, 60ML: Brand: MEDLINE

## (undated) DEVICE — SYSTEM CLOSURE 6-12 FR VEN VASC VASCADE MVP

## (undated) DEVICE — INTRODUCER SHTH 6FR L11CM 0.038IN STD SIDEPRT EXTN 3 W

## (undated) DEVICE — GLOVE SURG SZ 65 THK91MIL LTX FREE SYN POLYISOPRENE

## (undated) DEVICE — COVER LT HNDL BLU PLAS

## (undated) DEVICE — SCRUB DRY SURG EZ SCRUB BRUSH PREOPERATIVE GRN

## (undated) DEVICE — PIN HLD SM MINI SHT KNEE SYS OPTETRAK

## (undated) DEVICE — INTRODUCER SHTH L11CM OD11FR 0.38IN AD VASC W/OUT CRV

## (undated) DEVICE — 3M™ COBAN™ NL STERILE NON-LATEX SELF-ADHERENT WRAP, 2084S, 4 IN X 5 YD (10 CM X 4,5 M), 18 ROLLS/CASE: Brand: 3M™ COBAN™

## (undated) DEVICE — AMPLATZ EXTRA STIFF WIRE GUIDE: Brand: AMPLATZ

## (undated) DEVICE — KIT ELECTRD SURF FOR DISPLAYING THE 3D POS OF EP CATH

## (undated) DEVICE — 1 X VERSACROSS STEERABLE SHEATH (INCLUDING  1 X DILATOR AND 1 X J-TIP GUIDEWIRE); 1 X VERSACROSS RF WIRE (INCLUDING 1 X CONNECTOR CABLE (SINGLE USE)); 1 X DISPERSIVE ELECTRODE: Brand: VERSACROSS STEERABLE ACCESS SOLUTION

## (undated) DEVICE — SURGICAL PROCEDURE SET TBNG EP

## (undated) DEVICE — DRESSING,GAUZE,XEROFORM,CURAD,5"X9",ST: Brand: CURAD